# Patient Record
Sex: MALE | Race: WHITE | NOT HISPANIC OR LATINO | Employment: UNEMPLOYED | URBAN - METROPOLITAN AREA
[De-identification: names, ages, dates, MRNs, and addresses within clinical notes are randomized per-mention and may not be internally consistent; named-entity substitution may affect disease eponyms.]

---

## 2018-10-31 ENCOUNTER — OFFICE VISIT CONVERTED (OUTPATIENT)
Dept: INTERNAL MEDICINE | Facility: CLINIC | Age: 48
End: 2018-10-31
Attending: INTERNAL MEDICINE

## 2018-12-05 ENCOUNTER — OFFICE VISIT CONVERTED (OUTPATIENT)
Dept: INTERNAL MEDICINE | Facility: CLINIC | Age: 48
End: 2018-12-05
Attending: INTERNAL MEDICINE

## 2018-12-19 ENCOUNTER — OFFICE VISIT CONVERTED (OUTPATIENT)
Dept: INTERNAL MEDICINE | Facility: CLINIC | Age: 48
End: 2018-12-19
Attending: NURSE PRACTITIONER

## 2019-01-04 ENCOUNTER — HOSPITAL ENCOUNTER (OUTPATIENT)
Dept: OTHER | Facility: HOSPITAL | Age: 49
Discharge: HOME | End: 2019-01-04

## 2019-01-04 LAB
25(OH)D3 SERPL-MCNC: 34.8 NG/ML (ref 30–100)
ALBUMIN SERPL-MCNC: 4.3 G/DL (ref 3.5–5)
ALBUMIN/GLOB SERPL: 1.2 {RATIO} (ref 1.4–2.6)
ALP SERPL-CCNC: 63 U/L (ref 53–128)
ALT SERPL-CCNC: 19 U/L (ref 10–40)
ANION GAP SERPL CALC-SCNC: 16 MMOL/L (ref 8–19)
AST SERPL-CCNC: 25 U/L (ref 15–50)
BASOPHILS # BLD AUTO: 0.06 10*3/UL (ref 0–0.2)
BASOPHILS NFR BLD AUTO: 0.7 % (ref 0–3)
BILIRUB SERPL-MCNC: 0.69 MG/DL (ref 0.2–1.3)
BUN SERPL-MCNC: 14 MG/DL (ref 5–25)
BUN/CREAT SERPL: 11 {RATIO} (ref 6–20)
CALCIUM SERPL-MCNC: 9.6 MG/DL (ref 8.7–10.4)
CHLORIDE SERPL-SCNC: 103 MMOL/L (ref 99–111)
CONV CO2: 24 MMOL/L (ref 22–32)
CONV TOTAL PROTEIN: 8 G/DL (ref 6.3–8.2)
CREAT UR-MCNC: 1.23 MG/DL (ref 0.7–1.2)
EOSINOPHIL # BLD AUTO: 0.37 10*3/UL (ref 0–0.7)
EOSINOPHIL # BLD AUTO: 4.18 % (ref 0–7)
ERYTHROCYTE [DISTWIDTH] IN BLOOD BY AUTOMATED COUNT: 12.9 % (ref 11.5–14.5)
GFR SERPLBLD BASED ON 1.73 SQ M-ARVRAT: >60 ML/MIN/{1.73_M2}
GLOBULIN UR ELPH-MCNC: 3.7 G/DL (ref 2–3.5)
GLUCOSE SERPL-MCNC: 86 MG/DL (ref 70–99)
HBA1C MFR BLD: 15.6 G/DL (ref 14–18)
HCT VFR BLD AUTO: 44.9 % (ref 42–52)
LYMPHOCYTES # BLD AUTO: 2.41 10*3/UL (ref 1–5)
MCH RBC QN AUTO: 29.1 PG (ref 27–31)
MCHC RBC AUTO-ENTMCNC: 34.7 G/DL (ref 33–37)
MCV RBC AUTO: 83.9 FL (ref 80–96)
MONOCYTES # BLD AUTO: 0.84 10*3/UL (ref 0.2–1.2)
MONOCYTES NFR BLD AUTO: 9.5 % (ref 3–10)
NEUTROPHILS # BLD AUTO: 5.12 10*3/UL (ref 2–8)
NEUTROPHILS NFR BLD AUTO: 58.2 % (ref 30–85)
NRBC BLD AUTO-RTO: 0 % (ref 0–0.01)
OSMOLALITY SERPL CALC.SUM OF ELEC: 288 MOSM/KG (ref 273–304)
PLATELET # BLD AUTO: 326 10*3/UL (ref 130–400)
PMV BLD AUTO: 7.9 FL (ref 7.4–10.4)
POTASSIUM SERPL-SCNC: 4.4 MMOL/L (ref 3.5–5.3)
RBC # BLD AUTO: 5.36 10*6/UL (ref 4.7–6.1)
SODIUM SERPL-SCNC: 139 MMOL/L (ref 135–147)
VARIANT LYMPHS NFR BLD MANUAL: 27.4 % (ref 20–45)
WBC # BLD AUTO: 8.8 10*3/UL (ref 4.8–10.8)

## 2019-01-14 ENCOUNTER — OFFICE VISIT CONVERTED (OUTPATIENT)
Dept: INTERNAL MEDICINE | Facility: CLINIC | Age: 49
End: 2019-01-14
Attending: INTERNAL MEDICINE

## 2019-02-19 ENCOUNTER — CONVERSION ENCOUNTER (OUTPATIENT)
Dept: INTERNAL MEDICINE | Facility: CLINIC | Age: 49
End: 2019-02-19

## 2019-02-19 ENCOUNTER — OFFICE VISIT CONVERTED (OUTPATIENT)
Dept: INTERNAL MEDICINE | Facility: CLINIC | Age: 49
End: 2019-02-19
Attending: INTERNAL MEDICINE

## 2019-04-12 ENCOUNTER — OFFICE VISIT CONVERTED (OUTPATIENT)
Dept: CARDIOLOGY | Facility: CLINIC | Age: 49
End: 2019-04-12
Attending: INTERNAL MEDICINE

## 2019-04-12 ENCOUNTER — CONVERSION ENCOUNTER (OUTPATIENT)
Dept: CARDIOLOGY | Facility: CLINIC | Age: 49
End: 2019-04-12

## 2019-04-15 ENCOUNTER — OFFICE VISIT CONVERTED (OUTPATIENT)
Dept: NEUROLOGY | Facility: CLINIC | Age: 49
End: 2019-04-15
Attending: PSYCHIATRY & NEUROLOGY

## 2019-08-20 ENCOUNTER — OFFICE VISIT CONVERTED (OUTPATIENT)
Dept: INTERNAL MEDICINE | Facility: CLINIC | Age: 49
End: 2019-08-20
Attending: INTERNAL MEDICINE

## 2019-08-20 ENCOUNTER — HOSPITAL ENCOUNTER (OUTPATIENT)
Dept: OTHER | Facility: HOSPITAL | Age: 49
Discharge: HOME | End: 2019-08-20
Attending: INTERNAL MEDICINE

## 2019-08-20 LAB
BASOPHILS # BLD AUTO: 0.09 10*3/UL (ref 0–0.2)
BASOPHILS NFR BLD AUTO: 0.9 % (ref 0–3)
CHOLEST SERPL-MCNC: 119 MG/DL (ref 107–200)
CHOLEST/HDLC SERPL: 4 {RATIO} (ref 3–6)
CONV ABS IMM GRAN: 0.04 10*3/UL (ref 0–0.2)
CONV IMMATURE GRAN: 0.4 % (ref 0–1.8)
DEPRECATED RDW RBC AUTO: 43.7 FL (ref 35.1–43.9)
EOSINOPHIL # BLD AUTO: 0.32 10*3/UL (ref 0–0.7)
EOSINOPHIL # BLD AUTO: 3.1 % (ref 0–7)
ERYTHROCYTE [DISTWIDTH] IN BLOOD BY AUTOMATED COUNT: 13.4 % (ref 11.6–14.4)
FOLATE SERPL-MCNC: 11.3 NG/ML (ref 4.8–20)
HCT VFR BLD AUTO: 41.4 % (ref 42–52)
HDLC SERPL-MCNC: 30 MG/DL (ref 40–60)
HGB BLD-MCNC: 13.4 G/DL (ref 14–18)
LDLC SERPL CALC-MCNC: 51 MG/DL (ref 70–100)
LYMPHOCYTES # BLD AUTO: 2.17 10*3/UL (ref 1–5)
LYMPHOCYTES NFR BLD AUTO: 21.2 % (ref 20–45)
MAGNESIUM SERPL-MCNC: 2.26 MG/DL (ref 1.6–2.3)
MCH RBC QN AUTO: 29.1 PG (ref 27–31)
MCHC RBC AUTO-ENTMCNC: 32.4 G/DL (ref 33–37)
MCV RBC AUTO: 89.8 FL (ref 80–96)
MONOCYTES # BLD AUTO: 1.03 10*3/UL (ref 0.2–1.2)
MONOCYTES NFR BLD AUTO: 10.1 % (ref 3–10)
NEUTROPHILS # BLD AUTO: 6.59 10*3/UL (ref 2–8)
NEUTROPHILS NFR BLD AUTO: 64.3 % (ref 30–85)
NRBC CBCN: 0 % (ref 0–0.7)
PLATELET # BLD AUTO: 290 10*3/UL (ref 130–400)
PMV BLD AUTO: 11 FL (ref 9.4–12.4)
RBC # BLD AUTO: 4.61 10*6/UL (ref 4.7–6.1)
TRIGL SERPL-MCNC: 189 MG/DL (ref 40–150)
VIT B12 SERPL-MCNC: 461 PG/ML (ref 211–911)
VLDLC SERPL-MCNC: 38 MG/DL (ref 5–37)
WBC # BLD AUTO: 10.24 10*3/UL (ref 4.8–10.8)

## 2019-08-21 LAB
RETICS # AUTO: 0.06 10*6/UL (ref 0.03–0.1)
RETICS/RBC NFR AUTO: 1.28 % (ref 0.51–1.81)

## 2019-08-22 LAB
FERRITIN SERPL-MCNC: 172 NG/ML (ref 30–300)
IRON SATN MFR SERPL: 29 % (ref 20–55)
IRON SERPL-MCNC: 95 UG/DL (ref 70–180)
TIBC SERPL-MCNC: 329 UG/DL (ref 245–450)
TRANSFERRIN SERPL-MCNC: 230 MG/DL (ref 215–365)

## 2019-09-03 ENCOUNTER — OFFICE VISIT CONVERTED (OUTPATIENT)
Dept: INTERNAL MEDICINE | Facility: CLINIC | Age: 49
End: 2019-09-03
Attending: INTERNAL MEDICINE

## 2019-10-17 ENCOUNTER — OFFICE VISIT CONVERTED (OUTPATIENT)
Dept: CARDIOLOGY | Facility: CLINIC | Age: 49
End: 2019-10-17
Attending: INTERNAL MEDICINE

## 2019-12-18 ENCOUNTER — OFFICE VISIT CONVERTED (OUTPATIENT)
Dept: NEUROLOGY | Facility: CLINIC | Age: 49
End: 2019-12-18
Attending: PSYCHIATRY & NEUROLOGY

## 2020-03-02 ENCOUNTER — HOSPITAL ENCOUNTER (OUTPATIENT)
Dept: OTHER | Facility: HOSPITAL | Age: 50
Discharge: HOME | End: 2020-03-02
Attending: INTERNAL MEDICINE

## 2020-03-02 ENCOUNTER — OFFICE VISIT CONVERTED (OUTPATIENT)
Dept: INTERNAL MEDICINE | Facility: CLINIC | Age: 50
End: 2020-03-02
Attending: INTERNAL MEDICINE

## 2020-03-02 LAB
ALBUMIN SERPL-MCNC: 4.3 G/DL (ref 3.5–5)
ALBUMIN/GLOB SERPL: 1.3 {RATIO} (ref 1.4–2.6)
ALP SERPL-CCNC: 68 U/L (ref 53–128)
ALT SERPL-CCNC: 44 U/L (ref 10–40)
ANION GAP SERPL CALC-SCNC: 18 MMOL/L (ref 8–19)
AST SERPL-CCNC: 31 U/L (ref 15–50)
BASOPHILS # BLD AUTO: 0.09 10*3/UL (ref 0–0.2)
BASOPHILS NFR BLD AUTO: 1 % (ref 0–3)
BILIRUB SERPL-MCNC: 0.4 MG/DL (ref 0.2–1.3)
BUN SERPL-MCNC: 13 MG/DL (ref 5–25)
BUN/CREAT SERPL: 9 {RATIO} (ref 6–20)
CALCIUM SERPL-MCNC: 9.2 MG/DL (ref 8.7–10.4)
CHLORIDE SERPL-SCNC: 103 MMOL/L (ref 99–111)
CHOLEST SERPL-MCNC: 98 MG/DL (ref 107–200)
CHOLEST/HDLC SERPL: 3.6 {RATIO} (ref 3–6)
CONV ABS IMM GRAN: 0.04 10*3/UL (ref 0–0.2)
CONV CO2: 24 MMOL/L (ref 22–32)
CONV IMMATURE GRAN: 0.5 % (ref 0–1.8)
CONV TOTAL PROTEIN: 7.6 G/DL (ref 6.3–8.2)
CREAT UR-MCNC: 1.4 MG/DL (ref 0.7–1.2)
DEPRECATED RDW RBC AUTO: 43 FL (ref 35.1–43.9)
EOSINOPHIL # BLD AUTO: 0.3 10*3/UL (ref 0–0.7)
EOSINOPHIL # BLD AUTO: 3.4 % (ref 0–7)
ERYTHROCYTE [DISTWIDTH] IN BLOOD BY AUTOMATED COUNT: 13.2 % (ref 11.6–14.4)
GFR SERPLBLD BASED ON 1.73 SQ M-ARVRAT: 59 ML/MIN/{1.73_M2}
GLOBULIN UR ELPH-MCNC: 3.3 G/DL (ref 2–3.5)
GLUCOSE SERPL-MCNC: 159 MG/DL (ref 70–99)
HCT VFR BLD AUTO: 44.9 % (ref 42–52)
HDLC SERPL-MCNC: 27 MG/DL (ref 40–60)
HGB BLD-MCNC: 14.7 G/DL (ref 14–18)
LDLC SERPL CALC-MCNC: 34 MG/DL (ref 70–100)
LYMPHOCYTES # BLD AUTO: 2.43 10*3/UL (ref 1–5)
LYMPHOCYTES NFR BLD AUTO: 27.9 % (ref 20–45)
MCH RBC QN AUTO: 28.8 PG (ref 27–31)
MCHC RBC AUTO-ENTMCNC: 32.7 G/DL (ref 33–37)
MCV RBC AUTO: 87.9 FL (ref 80–96)
MONOCYTES # BLD AUTO: 0.76 10*3/UL (ref 0.2–1.2)
MONOCYTES NFR BLD AUTO: 8.7 % (ref 3–10)
NEUTROPHILS # BLD AUTO: 5.1 10*3/UL (ref 2–8)
NEUTROPHILS NFR BLD AUTO: 58.5 % (ref 30–85)
NRBC CBCN: 0 % (ref 0–0.7)
OSMOLALITY SERPL CALC.SUM OF ELEC: 295 MOSM/KG (ref 273–304)
PLATELET # BLD AUTO: 286 10*3/UL (ref 130–400)
PMV BLD AUTO: 10.8 FL (ref 9.4–12.4)
POTASSIUM SERPL-SCNC: 3.9 MMOL/L (ref 3.5–5.3)
RBC # BLD AUTO: 5.11 10*6/UL (ref 4.7–6.1)
SODIUM SERPL-SCNC: 141 MMOL/L (ref 135–147)
TRIGL SERPL-MCNC: 184 MG/DL (ref 40–150)
TSH SERPL-ACNC: 1.06 M[IU]/L (ref 0.27–4.2)
VLDLC SERPL-MCNC: 37 MG/DL (ref 5–37)
WBC # BLD AUTO: 8.72 10*3/UL (ref 4.8–10.8)

## 2020-05-27 ENCOUNTER — TELEMEDICINE CONVERTED (OUTPATIENT)
Dept: CARDIOLOGY | Facility: CLINIC | Age: 50
End: 2020-05-27
Attending: INTERNAL MEDICINE

## 2020-07-09 ENCOUNTER — HOSPITAL ENCOUNTER (OUTPATIENT)
Dept: OTHER | Facility: HOSPITAL | Age: 50
Discharge: HOME | End: 2020-07-09
Attending: INTERNAL MEDICINE

## 2020-07-09 LAB
EST. AVERAGE GLUCOSE BLD GHB EST-MCNC: 128 MG/DL
HBA1C MFR BLD: 6.1 % (ref 3.5–5.7)

## 2020-11-12 ENCOUNTER — CONVERSION ENCOUNTER (OUTPATIENT)
Dept: CARDIOLOGY | Facility: CLINIC | Age: 50
End: 2020-11-12

## 2020-11-12 ENCOUNTER — OFFICE VISIT CONVERTED (OUTPATIENT)
Dept: CARDIOLOGY | Facility: CLINIC | Age: 50
End: 2020-11-12
Attending: INTERNAL MEDICINE

## 2021-05-10 ENCOUNTER — OFFICE VISIT CONVERTED (OUTPATIENT)
Dept: CARDIOLOGY | Facility: CLINIC | Age: 51
End: 2021-05-10
Attending: INTERNAL MEDICINE

## 2021-05-10 ENCOUNTER — CONVERSION ENCOUNTER (OUTPATIENT)
Dept: CARDIOLOGY | Facility: CLINIC | Age: 51
End: 2021-05-10

## 2021-05-13 NOTE — PROGRESS NOTES
"   Progress Note      Patient Name: Yefri Mcgee   Patient ID: [id number]   Sex: Male   YOB: 1970    Primary Care Provider: Len Nichols MD   Referring Provider: Len Nichols MD    Visit Date: November 12, 2020    Provider: Leo Alonso MD   Location: [facility]   Location Address: [address] [place] [address], [facility]   [place], [place]  [id number]   Location Phone: [phone]          Chief Complaint     Atrial fibrillation.  Coronary artery disease.       History Of Present Illness  REFERRING CARE PROVIDER: Len Nichols MD   Yefri Mcgee is a 49 year old /White male with known coronary artery disease with prior stent, paroxysmal atrial fibrillation, mild systolic heart failure, EF 45%, and hypertension who has been doing well. He did say he had not been completely compliant with his medications due to being out of town and on the go recently.   PAST MEDICAL HISTORY: Atrial fibrillation, paroxysmal; CVA; Congestive heart failure, systolic; Coronary artery disease with stent; Hyperlipidemia; Hypertension.   PSYCHOSOCIAL HISTORY: Current smoker of one pack per day. Rarely consumes alcohol.   CURRENT MEDICATIONS: Amlodipine 10 mg daily; ezetimibe 10 mg daily; losartan 100 mg daily; carvedilol 25 mg daily; Xarelto 20 mg daily; aspirin 81 mg daily; furosemide 40 mg daily; atorvastatin 80 mg daily.       Review of Systems  · Cardiovascular  o Denies  o : palpitations (fast, fluttering, or skipping beats), swelling (feet, ankles, hands), shortness of breath while walking or lying flat, chest pain or angina pectoris   · Respiratory  o Denies  o : chronic or frequent cough      Vitals  Date Time BP Position Site L\R Cuff Size HR RR TEMP (F) WT  HT  BMI kg/m2 BSA m2 O2 Sat FR L/min FiO2 HC       11/12/2020 09:15 /94 Sitting    69 - R   295lbs 16oz 5'  9\" 43.71 2.56       11/12/2020 09:15 /97 Sitting                       Physical " Examination  · Constitutional  o Appearance  o : Awake, alert, in no acute distress.   · Eyes  o Conjunctivae  o : Normal.  · Ears, Nose, Mouth and Throat  o Oral Cavity  o :   § Oral Mucosa  § : Normal.  · Neck  o Inspection/Palpation  o : No JVD. Good carotid upstroke. No thyromegaly.  · Respiratory  o Respiratory  o : Good respiratory effort. Clear to percussion and auscultation.  · Cardiovascular  o Heart  o :   § Auscultation of Heart  § : S1, S2 normal. Regular rate and rhythm without murmurs, gallops, or rubs.  o Peripheral Vascular System  o :   § Extremities  § : Good femoral and pedal pulses. No pedal edema.  · Gastrointestinal  o Abdominal Examination  o : Soft. No tenderness or masses felt. No hepatosplenomegaly. Abdominal aorta is not palpable.  · Labs  o Labs  o : Creatinine 1.4, potassium 3.7, triglycerides 218.           Assessment     ASSESSMENT & PLAN:    1.  Coronary artery disease with previous stenting, no anginal discomfort.  Continue with chronic       aspirin 81 mg once a day.  2.  Hypertension, mild elevation, but patient had not been compliant with his meds.  He states he is going to be        doing a better job in the future.  Recommended keeping a home blood pressure log.  Counseled on weight        loss and exercise.  3.  Hyperlipidemia.  Still attempting to obtain his LDL level.  Continue with his current statin dose.  4.  Smoking.  Counseled on importance of cessation.  Offered both Chantix and Wellbutrin. Did spend over 3        minutes counseling.             Electronically Signed by: Latonia Phillips-, Other -Author on November 17, 2020 12:50:02 PM  Electronically Co-signed by: Leo Alonso MD -Reviewer on November 18, 2020 09:38:59 AM

## 2021-05-13 NOTE — PROGRESS NOTES
Progress Note      Patient Name: Yefri Mcgee   Patient ID: [id number]   Sex: Male   YOB: 1970    Primary Care Provider: Len Nichols MD   Referring Provider: Len Nichols MD    Visit Date: May 27, 2020    Provider: Leo Alonso MD   Location: [facility]   Location Address: [address] [place] [address]   [place]  [id number]   Location Phone: [phone]          Chief Complaint  · Atrial fibrillation   · Congestive heart failure       History Of Present Illness  TELEHEALTH TELEPHONE VISIT  Yefri Mcgee is a 49 year old /White male who is presenting for evaluation via telehealth telephone visit. Verbal consent obtained before beginning visit. Telehealth due to COVID-19. He has known coronary artery disease with prior stenting, mild systolic congestive heart failure, ejection fraction of 45%, paroxysmal atrial fibrillation and hypertension. No new problems or complaints. He denies chest pain or shortness of breath.   Provider spent 5 minutes with the patient during the telehealth visit.   The following staff were present during this visit: Provider only.   PAST MEDICAL HISTORY: Atrial fibrillation, paroxysmal; CVA; Congestive heart failure, systolic; Coronary artery disease with stent; Hyperlipidemia; Hypertension.   FAMILY HISTORY: Positive for diabetes.   PSYCHOSOCIAL HISTORY: No history of mood changes or depression. He rarely drinks alcohol. He smokes 1 pack of cigarettes per day.   CURRENT MEDICATIONS: include ASA 81 mg daily; Xarelto 20 mg daily; Lasix 40 mg daily; Amlodipine 10 mg daily; Atorvastatin 80 mg daily; Carvedilol 25 mg b.i.d.; Losartan 100 mg daily; Zetia 10 mg daily. The dosage and frequency of the medications were reviewed with the patient.       Review of Systems  · Cardiovascular  o Denies  o : palpitations (fast, fluttering, or skipping beats), swelling (feet, ankles, hands), shortness of breath while walking or lying  "flat, chest pain or angina pectoris   · Respiratory  o Denies  o : chronic or frequent cough, asthma or wheezing      Vitals     Per patient, home blood pressure is 132/90.  Height: 5'9\".  Weight: 295.           Assessment     ASSESSMENT AND PLAN:    1.  Coronary artery disease with prior stenting:  On chronic ASA with no anginal symptoms.  2.  Paroxysmal atrial fibrillation:  Maintaining normal sinus rhythm and on Xarelto for CVA prevention.  3.  Hypertension controlled.  4.  Dyslipidemia:  Plan on repeating lipids on next visit, goal LDL of less than 70.  Tolerating well.    MD Paresh Luther/mckenna         This note was transcribed by Joyce Saavedra.  mckenna/paresh   The above service was transcribed by Joyce Saavedra, and I attest to the accuracy of the note.  PARESH.               Electronically Signed by: Joyce Saavedra-, -Author on June 4, 2020 09:08:27 AM  Electronically Co-signed by: Leo Alonso MD -Reviewer on June 4, 2020 09:10:15 AM  "

## 2021-05-14 VITALS
HEIGHT: 69 IN | DIASTOLIC BLOOD PRESSURE: 94 MMHG | WEIGHT: 296 LBS | HEART RATE: 69 BPM | SYSTOLIC BLOOD PRESSURE: 148 MMHG | BODY MASS INDEX: 43.84 KG/M2

## 2021-05-15 VITALS
DIASTOLIC BLOOD PRESSURE: 86 MMHG | WEIGHT: 295.31 LBS | HEIGHT: 69 IN | BODY MASS INDEX: 43.74 KG/M2 | SYSTOLIC BLOOD PRESSURE: 145 MMHG | HEART RATE: 65 BPM

## 2021-05-15 VITALS
OXYGEN SATURATION: 97 % | DIASTOLIC BLOOD PRESSURE: 82 MMHG | WEIGHT: 290 LBS | HEART RATE: 68 BPM | TEMPERATURE: 97.6 F | SYSTOLIC BLOOD PRESSURE: 126 MMHG | HEIGHT: 69 IN | BODY MASS INDEX: 42.95 KG/M2

## 2021-05-15 VITALS
TEMPERATURE: 97 F | HEIGHT: 69 IN | BODY MASS INDEX: 42.36 KG/M2 | OXYGEN SATURATION: 98 % | DIASTOLIC BLOOD PRESSURE: 82 MMHG | WEIGHT: 286 LBS | HEART RATE: 77 BPM | SYSTOLIC BLOOD PRESSURE: 134 MMHG

## 2021-05-15 VITALS
WEIGHT: 285 LBS | SYSTOLIC BLOOD PRESSURE: 190 MMHG | DIASTOLIC BLOOD PRESSURE: 128 MMHG | TEMPERATURE: 98.4 F | OXYGEN SATURATION: 97 % | BODY MASS INDEX: 42.21 KG/M2 | HEART RATE: 92 BPM | HEIGHT: 69 IN

## 2021-05-15 VITALS
TEMPERATURE: 99 F | OXYGEN SATURATION: 97 % | SYSTOLIC BLOOD PRESSURE: 172 MMHG | DIASTOLIC BLOOD PRESSURE: 102 MMHG | HEART RATE: 74 BPM | WEIGHT: 282 LBS | HEIGHT: 69 IN | BODY MASS INDEX: 41.77 KG/M2 | RESPIRATION RATE: 16 BRPM

## 2021-05-15 VITALS
SYSTOLIC BLOOD PRESSURE: 136 MMHG | HEIGHT: 69 IN | BODY MASS INDEX: 43.69 KG/M2 | DIASTOLIC BLOOD PRESSURE: 90 MMHG | HEART RATE: 68 BPM | WEIGHT: 295 LBS

## 2021-05-15 VITALS
HEART RATE: 90 BPM | OXYGEN SATURATION: 97 % | HEIGHT: 69 IN | DIASTOLIC BLOOD PRESSURE: 72 MMHG | WEIGHT: 280.25 LBS | TEMPERATURE: 97.8 F | SYSTOLIC BLOOD PRESSURE: 146 MMHG | BODY MASS INDEX: 41.51 KG/M2

## 2021-05-15 VITALS
WEIGHT: 286 LBS | SYSTOLIC BLOOD PRESSURE: 112 MMHG | BODY MASS INDEX: 42.36 KG/M2 | HEIGHT: 69 IN | HEART RATE: 64 BPM | DIASTOLIC BLOOD PRESSURE: 66 MMHG

## 2021-05-15 VITALS
HEART RATE: 80 BPM | TEMPERATURE: 97.2 F | HEIGHT: 69 IN | OXYGEN SATURATION: 95 % | DIASTOLIC BLOOD PRESSURE: 66 MMHG | BODY MASS INDEX: 43.69 KG/M2 | SYSTOLIC BLOOD PRESSURE: 122 MMHG | WEIGHT: 295 LBS

## 2021-05-15 VITALS
OXYGEN SATURATION: 96 % | HEART RATE: 88 BPM | WEIGHT: 302.5 LBS | DIASTOLIC BLOOD PRESSURE: 92 MMHG | BODY MASS INDEX: 44.8 KG/M2 | SYSTOLIC BLOOD PRESSURE: 122 MMHG | HEIGHT: 69 IN | TEMPERATURE: 97.2 F

## 2021-05-15 VITALS
SYSTOLIC BLOOD PRESSURE: 121 MMHG | HEART RATE: 66 BPM | BODY MASS INDEX: 42.21 KG/M2 | WEIGHT: 285 LBS | DIASTOLIC BLOOD PRESSURE: 71 MMHG | HEIGHT: 69 IN

## 2021-05-16 VITALS
DIASTOLIC BLOOD PRESSURE: 86 MMHG | HEIGHT: 69 IN | SYSTOLIC BLOOD PRESSURE: 152 MMHG | TEMPERATURE: 97 F | HEART RATE: 105 BPM | OXYGEN SATURATION: 96 % | BODY MASS INDEX: 42.51 KG/M2 | WEIGHT: 287 LBS

## 2021-06-05 NOTE — PROGRESS NOTES
"   Progress Note      Patient Name: Yefri Mcgee   Patient ID: [id number]   Sex: Male   YOB: 1970    Primary Care Provider: Len Nichols MD   Referring Provider: Len Nichols MD    Visit Date: May 10, 2021    Provider: Leo Alonso MD   Location: [facility]   Location Address: [address] [place] [address], [facility]   [place], [place]  [id number]   Location Phone: [phone]          Chief Complaint     Coronary artery disease.       History Of Present Illness  REFERRING CARE PROVIDER: Len Nichols MD   Yefri Mcgee is a 50 year old /White male with coronary artery disease with prior stenting, paroxysmal atrial fibrillation, mild systolic heart failure, EF 45%, and hypertension who has been doing well. The patient denies any ongoing chest pain issues. No shortness of breath.   PAST MEDICAL HISTORY: Atrial fibrillation, paroxysmal; CVA; Congestive heart failure, systolic; Coronary artery disease with stent; Hyperlipidemia; Hypertension.   PSYCHOSOCIAL HISTORY: Current smoker of 1.5 packs per year. Rarely consumes alcohol.   CURRENT MEDICATIONS: Medications reviewed and are as documented.      ALLERGIES:  No known drug allergies.       Review of Systems  · Cardiovascular  o Denies  o : palpitations (fast, fluttering, or skipping beats), swelling (feet, ankles, hands), shortness of breath while walking or lying flat, chest pain or angina pectoris   · Respiratory  o Denies  o : chronic or frequent cough      Vitals  Date Time BP Position Site L\R Cuff Size HR RR TEMP (F) WT  HT  BMI kg/m2 BSA m2 O2 Sat FR L/min FiO2 HC       05/10/2021 10:31 /80 Sitting    70 - R   302lbs 0oz 5'  9\" 44.6 2.58             Physical Examination  · Constitutional  o Appearance  o : Awake, alert, in no acute distress.   · Eyes  o Conjunctivae  o : Normal.  · Ears, Nose, Mouth and Throat  o Oral Cavity  o :   § Oral Mucosa  § : Normal.  · Neck  o Inspection/Palpation  o : No JVD. Good carotid " upstroke. No thyromegaly.  · Respiratory  o Respiratory  o : Good respiratory effort. Clear to percussion and auscultation.  · Cardiovascular  o Heart  o :   § Auscultation of Heart  § : S1, S2 normal. Regular rate and rhythm without murmurs, gallops, or rubs.  o Peripheral Vascular System  o :   § Extremities  § : Trace lower extremity edema bilaterally. Good femoral and pedal pulses.   · Gastrointestinal  o Abdominal Examination  o : Soft. No tenderness or masses felt. No hepatosplenomegaly. Abdominal aorta is not palpable.  · EKG  o EKG  o : Performed in the office today.  o Indications  o : Coronary artery disease.  o Results  o : Normal sinus rhythm. Incomplete left bundle branch block. Probable LVH. ST elevations secondary to early repolarization.  o Comparison  o : No significant acute changes.           Assessment     ASSESSMENT & PLAN:    1.  Coronary artery disease with prior stenting.  No anginal discomfort.  On chronic aspirin 81 mg once a day.  2.  Hyperlipidemia, on high-intensity statin with Lipitor 80 mg daily.  Previous LDL had been 80.  Will repeat        today.  Continue with his current dose.  Patient is also on Zetia 10 mg once a day.    3.  Hypertension, controlled on losartan 100 mg daily.    4.  Smoking.  Counseled on importance of cessation.  Patient does still smoke 1.5 packs per year.  He states he        was entertaining the possibility of quitting, but was trying to convince his wife to, as well, and is going to        discuss with his PCP.           Plan  · Referrals  o ID: [id number] Date: 10/27/2020 Type: Inbound  Specialty: Cardiology            Electronically Signed by: Latonia Phillips-, Other -Author on May 11, 2021 05:30:25 AM  Electronically Co-signed by: Leo Alonso MD -Reviewer on May 12, 2021 03:39:05 PM

## 2021-06-07 ENCOUNTER — OFFICE VISIT (OUTPATIENT)
Dept: INTERNAL MEDICINE | Facility: CLINIC | Age: 51
End: 2021-06-07

## 2021-06-07 VITALS
TEMPERATURE: 97.2 F | DIASTOLIC BLOOD PRESSURE: 87 MMHG | HEIGHT: 69 IN | SYSTOLIC BLOOD PRESSURE: 130 MMHG | OXYGEN SATURATION: 97 % | HEART RATE: 70 BPM | BODY MASS INDEX: 44.14 KG/M2 | WEIGHT: 298 LBS

## 2021-06-07 DIAGNOSIS — I63.9 CEREBROVASCULAR ACCIDENT (CVA), UNSPECIFIED MECHANISM (HCC): ICD-10-CM

## 2021-06-07 DIAGNOSIS — E78.5 HYPERLIPIDEMIA, UNSPECIFIED HYPERLIPIDEMIA TYPE: ICD-10-CM

## 2021-06-07 DIAGNOSIS — I48.20 CHRONIC ATRIAL FIBRILLATION (HCC): Primary | ICD-10-CM

## 2021-06-07 DIAGNOSIS — N18.2 CHRONIC RENAL IMPAIRMENT, STAGE 2 (MILD): ICD-10-CM

## 2021-06-07 DIAGNOSIS — I10 ESSENTIAL HYPERTENSION: ICD-10-CM

## 2021-06-07 DIAGNOSIS — R73.01 IMPAIRED FASTING GLUCOSE: Chronic | ICD-10-CM

## 2021-06-07 PROBLEM — N18.9 CHRONIC RENAL IMPAIRMENT: Status: ACTIVE | Noted: 2021-06-07

## 2021-06-07 PROBLEM — I48.91 ATRIAL FIBRILLATION: Status: ACTIVE | Noted: 2019-01-15

## 2021-06-07 LAB
ALBUMIN SERPL-MCNC: 4.2 G/DL (ref 3.5–5.2)
ALBUMIN/GLOB SERPL: 1.3 G/DL
ALP SERPL-CCNC: 70 U/L (ref 39–117)
ALT SERPL W P-5'-P-CCNC: 22 U/L (ref 1–41)
ANION GAP SERPL CALCULATED.3IONS-SCNC: 7.4 MMOL/L (ref 5–15)
AST SERPL-CCNC: 17 U/L (ref 1–40)
BASOPHILS # BLD AUTO: 0.09 10*3/MM3 (ref 0–0.2)
BASOPHILS NFR BLD AUTO: 1 % (ref 0–1.5)
BILIRUB SERPL-MCNC: 0.7 MG/DL (ref 0–1.2)
BUN SERPL-MCNC: 15 MG/DL (ref 6–20)
BUN/CREAT SERPL: 10.9 (ref 7–25)
CALCIUM SPEC-SCNC: 9.2 MG/DL (ref 8.6–10.5)
CHLORIDE SERPL-SCNC: 105 MMOL/L (ref 98–107)
CHOLEST SERPL-MCNC: 134 MG/DL (ref 0–200)
CO2 SERPL-SCNC: 25.6 MMOL/L (ref 22–29)
CREAT SERPL-MCNC: 1.37 MG/DL (ref 0.76–1.27)
DEPRECATED RDW RBC AUTO: 43.5 FL (ref 37–54)
EOSINOPHIL # BLD AUTO: 0.26 10*3/MM3 (ref 0–0.4)
EOSINOPHIL NFR BLD AUTO: 2.8 % (ref 0.3–6.2)
ERYTHROCYTE [DISTWIDTH] IN BLOOD BY AUTOMATED COUNT: 13.7 % (ref 12.3–15.4)
GFR SERPL CREATININE-BSD FRML MDRD: 55 ML/MIN/1.73
GLOBULIN UR ELPH-MCNC: 3.2 GM/DL
GLUCOSE SERPL-MCNC: 82 MG/DL (ref 65–99)
HBA1C MFR BLD: 6.2 % (ref 4.8–5.6)
HCT VFR BLD AUTO: 49.1 % (ref 37.5–51)
HDLC SERPL-MCNC: 27 MG/DL (ref 40–60)
HGB BLD-MCNC: 17 G/DL (ref 13–17.7)
IMM GRANULOCYTES # BLD AUTO: 0.04 10*3/MM3 (ref 0–0.05)
IMM GRANULOCYTES NFR BLD AUTO: 0.4 % (ref 0–0.5)
LDLC SERPL CALC-MCNC: 69 MG/DL (ref 0–100)
LDLC/HDLC SERPL: 2.28 {RATIO}
LYMPHOCYTES # BLD AUTO: 2.51 10*3/MM3 (ref 0.7–3.1)
LYMPHOCYTES NFR BLD AUTO: 27.1 % (ref 19.6–45.3)
MCH RBC QN AUTO: 30.1 PG (ref 26.6–33)
MCHC RBC AUTO-ENTMCNC: 34.6 G/DL (ref 31.5–35.7)
MCV RBC AUTO: 86.9 FL (ref 79–97)
MONOCYTES # BLD AUTO: 0.92 10*3/MM3 (ref 0.1–0.9)
MONOCYTES NFR BLD AUTO: 9.9 % (ref 5–12)
NEUTROPHILS NFR BLD AUTO: 5.45 10*3/MM3 (ref 1.7–7)
NEUTROPHILS NFR BLD AUTO: 58.8 % (ref 42.7–76)
NRBC BLD AUTO-RTO: 0 /100 WBC (ref 0–0.2)
PLATELET # BLD AUTO: 315 10*3/MM3 (ref 140–450)
PMV BLD AUTO: 10.9 FL (ref 6–12)
POTASSIUM SERPL-SCNC: 3.9 MMOL/L (ref 3.5–5.2)
PROT SERPL-MCNC: 7.4 G/DL (ref 6–8.5)
RBC # BLD AUTO: 5.65 10*6/MM3 (ref 4.14–5.8)
SODIUM SERPL-SCNC: 138 MMOL/L (ref 136–145)
TRIGL SERPL-MCNC: 227 MG/DL (ref 0–150)
TSH SERPL DL<=0.05 MIU/L-ACNC: 1.4 UIU/ML (ref 0.27–4.2)
VLDLC SERPL-MCNC: 38 MG/DL (ref 5–40)
WBC # BLD AUTO: 9.27 10*3/MM3 (ref 3.4–10.8)

## 2021-06-07 PROCEDURE — 83036 HEMOGLOBIN GLYCOSYLATED A1C: CPT | Performed by: INTERNAL MEDICINE

## 2021-06-07 PROCEDURE — 80053 COMPREHEN METABOLIC PANEL: CPT | Performed by: INTERNAL MEDICINE

## 2021-06-07 PROCEDURE — 85025 COMPLETE CBC W/AUTO DIFF WBC: CPT | Performed by: INTERNAL MEDICINE

## 2021-06-07 PROCEDURE — 99214 OFFICE O/P EST MOD 30 MIN: CPT | Performed by: INTERNAL MEDICINE

## 2021-06-07 PROCEDURE — 80061 LIPID PANEL: CPT | Performed by: INTERNAL MEDICINE

## 2021-06-07 PROCEDURE — 84443 ASSAY THYROID STIM HORMONE: CPT | Performed by: INTERNAL MEDICINE

## 2021-06-07 RX ORDER — CARVEDILOL 25 MG/1
25 TABLET ORAL 2 TIMES DAILY WITH MEALS
Qty: 90 TABLET | Refills: 3 | Status: SHIPPED | OUTPATIENT
Start: 2021-06-07 | End: 2021-08-09 | Stop reason: SDUPTHER

## 2021-06-07 RX ORDER — EZETIMIBE 10 MG/1
10 TABLET ORAL DAILY
Qty: 90 TABLET | Refills: 3 | Status: SHIPPED | OUTPATIENT
Start: 2021-06-07 | End: 2021-08-09 | Stop reason: SDUPTHER

## 2021-06-07 RX ORDER — ASPIRIN 81 MG/1
81 TABLET, CHEWABLE ORAL DAILY
Qty: 90 TABLET | Refills: 3 | Status: SHIPPED | OUTPATIENT
Start: 2021-06-07 | End: 2021-11-23 | Stop reason: SDUPTHER

## 2021-06-07 RX ORDER — EZETIMIBE 10 MG/1
TABLET ORAL
COMMUNITY
End: 2021-06-07 | Stop reason: SDUPTHER

## 2021-06-07 RX ORDER — CARVEDILOL 25 MG/1
25 TABLET ORAL 2 TIMES DAILY WITH MEALS
COMMUNITY
End: 2021-06-07 | Stop reason: SDUPTHER

## 2021-06-07 RX ORDER — ATORVASTATIN CALCIUM 80 MG/1
80 TABLET, FILM COATED ORAL DAILY
Qty: 90 TABLET | Refills: 3 | Status: SHIPPED | OUTPATIENT
Start: 2021-06-07 | End: 2021-08-09 | Stop reason: SDUPTHER

## 2021-06-07 RX ORDER — ATORVASTATIN CALCIUM 80 MG/1
80 TABLET, FILM COATED ORAL DAILY
COMMUNITY
End: 2021-06-07 | Stop reason: SDUPTHER

## 2021-06-07 RX ORDER — AMLODIPINE BESYLATE AND BENAZEPRIL HYDROCHLORIDE 10; 20 MG/1; MG/1
1 CAPSULE ORAL DAILY
COMMUNITY
End: 2021-06-07

## 2021-06-07 RX ORDER — ASPIRIN 81 MG/1
81 TABLET, CHEWABLE ORAL DAILY
COMMUNITY
End: 2021-06-07 | Stop reason: SDUPTHER

## 2021-06-07 RX ORDER — LOSARTAN POTASSIUM 100 MG/1
100 TABLET ORAL DAILY
Qty: 90 TABLET | Refills: 3 | Status: SHIPPED | OUTPATIENT
Start: 2021-06-07 | End: 2021-07-31 | Stop reason: HOSPADM

## 2021-06-07 RX ORDER — FUROSEMIDE 40 MG/1
40 TABLET ORAL 2 TIMES DAILY
COMMUNITY
End: 2021-06-07 | Stop reason: SDUPTHER

## 2021-06-07 RX ORDER — LOSARTAN POTASSIUM AND HYDROCHLOROTHIAZIDE 25; 100 MG/1; MG/1
1 TABLET ORAL DAILY
COMMUNITY
End: 2021-06-07

## 2021-06-07 RX ORDER — FUROSEMIDE 40 MG/1
40 TABLET ORAL 2 TIMES DAILY
Qty: 180 TABLET | Refills: 0 | Status: SHIPPED | OUTPATIENT
Start: 2021-06-07 | End: 2021-08-09

## 2021-06-07 RX ORDER — AMLODIPINE BESYLATE 10 MG/1
10 TABLET ORAL DAILY
Qty: 30 TABLET | Refills: 3 | Status: SHIPPED | OUTPATIENT
Start: 2021-06-07 | End: 2021-08-09 | Stop reason: SDUPTHER

## 2021-06-07 NOTE — PROGRESS NOTES
"Chief Complaint  Follow-up (routine follow up) follow up.     Subjective     {Problem List  Visit Diagnosis   Encounters  Notes  Medications  Labs  Result Review Imaging  Media :23}     Yefri Mcgee presents to [facility]  History of Present Illness  HTN- pt denies Has, dizziness, CP  CAD s/p PCI- no CP or JOSEPH. Pt cont to f/u with cardiology.   Atrial fibrillation- doing well.  HLD- reports compliance with statin and zetia.  CVA- at baseline. Doing well.     Objective   Vital Signs:   /87 (BP Location: Left arm, Patient Position: Sitting, Cuff Size: Large Adult)   Pulse 70   Temp 97.2 °F (36.2 °C)   Ht 175.3 cm (69\")   Wt 135 kg (298 lb)   SpO2 97%   BMI 44.01 kg/m²     Appearance: No acute distress, well-nourished  Head: normocephalic, atraumatic  Eyes: extraocular movements intact, no scleral icterus, no conjunctival injection  Ears, Nose, and Throat: external ears normal, nares patent, moist mucous membranes  Cardiovascular: regular rate and rhythm. no murmurs, rales, or rhonchi. no edema  Respiratory: breathing comfortably, symmetric chest rise, clear to auscultation bilaterally. No wheezes, rales, or rhonchi.  Neuro: alert and oriented to time, place, and person. Normal gait  Psych: normal mood and affect     Physical Exam   Result Review :   The following data was reviewed by: Len Nichols Jr, MD on 06/07/2021:  Common labs    Common Labsle 7/9/20   Hemoglobin A1C 6.1 (A)   (A) Abnormal value       Comments are available for some flowsheets but are not being displayed.                Assessment and Plan    Diagnoses and all orders for this visit:    1. Chronic atrial fibrillation (CMS/HCC) (Primary)  Comments:  rate controlled. cont xarelto.  Orders:  -     Comprehensive Metabolic Panel  -     CBC & Differential  -     TSH    2. Hyperlipidemia, unspecified hyperlipidemia type  Comments:  cont statin and zetia. check " labs  Orders:  -     Lipid Panel  -     Comprehensive Metabolic Panel  -     CBC & Differential    3. Essential hypertension  Comments:  well controlled. difficulty with remembering coreg BID dosing schedule. will discuss transition to metoprolol with Dr. Alonso.   Orders:  -     Comprehensive Metabolic Panel  -     CBC & Differential  -     TSH    4. Chronic renal impairment, stage 2 (mild)  -     Comprehensive Metabolic Panel  -     CBC & Differential  -     TSH    5. Cerebrovascular accident (CVA), unspecified mechanism (CMS/HCC)  -     Lipid Panel  -     TSH    6. Impaired fasting glucose  -     Hemoglobin A1c    Other orders  -     aspirin 81 MG chewable tablet; Chew 1 tablet Daily.  Dispense: 90 tablet; Refill: 3  -     atorvastatin (LIPITOR) 80 MG tablet; Take 1 tablet by mouth Daily.  Dispense: 90 tablet; Refill: 3  -     carvedilol (COREG) 25 MG tablet; Take 1 tablet by mouth 2 (Two) Times a Day With Meals.  Dispense: 90 tablet; Refill: 3  -     ezetimibe (ZETIA) 10 MG tablet; Take 1 tablet by mouth Daily for 90 days.  Dispense: 90 tablet; Refill: 3  -     furosemide (LASIX) 40 MG tablet; Take 1 tablet by mouth 2 (Two) Times a Day for 90 days.  Dispense: 180 tablet; Refill: 0  -     rivaroxaban (XARELTO) 20 MG tablet; Take 1 tablet by mouth Daily for 90 days.  Dispense: 90 tablet; Refill: 3  -     amLODIPine (NORVASC) 10 MG tablet; Take 1 tablet by mouth Daily for 90 days.  Dispense: 30 tablet; Refill: 3  -     losartan (Cozaar) 100 MG tablet; Take 1 tablet by mouth Daily for 90 days.  Dispense: 90 tablet; Refill: 3      Follow Up   Return in about 6 months (around 12/7/2021).  Patient was given instructions and counseling regarding his condition or for health maintenance advice. Please see specific information pulled into the AVS if appropriate.

## 2021-07-15 VITALS
SYSTOLIC BLOOD PRESSURE: 124 MMHG | WEIGHT: 302 LBS | DIASTOLIC BLOOD PRESSURE: 80 MMHG | BODY MASS INDEX: 44.73 KG/M2 | HEART RATE: 70 BPM | HEIGHT: 69 IN

## 2021-07-28 ENCOUNTER — HOSPITAL ENCOUNTER (INPATIENT)
Facility: HOSPITAL | Age: 51
LOS: 3 days | Discharge: HOME | End: 2021-07-31
Attending: EMERGENCY MEDICINE | Admitting: INTERNAL MEDICINE

## 2021-07-28 ENCOUNTER — APPOINTMENT (OUTPATIENT)
Dept: GENERAL RADIOLOGY | Facility: HOSPITAL | Age: 51
End: 2021-07-28

## 2021-07-28 DIAGNOSIS — I21.3 ACUTE ST ELEVATION MYOCARDIAL INFARCTION (STEMI), UNSPECIFIED ARTERY (HCC): Primary | ICD-10-CM

## 2021-07-28 DIAGNOSIS — Z95.5 S/P CORONARY ARTERY STENT PLACEMENT: ICD-10-CM

## 2021-07-28 DIAGNOSIS — I10 UNCONTROLLED HYPERTENSION: ICD-10-CM

## 2021-07-28 DIAGNOSIS — I21.02 ACUTE ST ELEVATION MYOCARDIAL INFARCTION (STEMI) DUE TO OCCLUSION OF MID PORTION OF LEFT ANTERIOR DESCENDING (LAD) CORONARY ARTERY (HCC): ICD-10-CM

## 2021-07-28 LAB
ACT BLD: 246 SECONDS (ref 89–137)
ACT BLD: 318 SECONDS (ref 89–137)
ALBUMIN SERPL-MCNC: 3.9 G/DL (ref 3.5–5.2)
ALBUMIN/GLOB SERPL: 1 G/DL
ALP SERPL-CCNC: 70 U/L (ref 39–117)
ALT SERPL W P-5'-P-CCNC: 24 U/L (ref 1–41)
ANION GAP SERPL CALCULATED.3IONS-SCNC: 14.5 MMOL/L (ref 5–15)
APTT PPP: 23.5 SECONDS (ref 22.2–34.2)
AST SERPL-CCNC: 51 U/L (ref 1–40)
BASOPHILS # BLD AUTO: 0.1 10*3/MM3 (ref 0–0.2)
BASOPHILS NFR BLD AUTO: 0.9 % (ref 0–1.5)
BILIRUB SERPL-MCNC: 0.3 MG/DL (ref 0–1.2)
BUN SERPL-MCNC: 13 MG/DL (ref 6–20)
BUN/CREAT SERPL: 12.1 (ref 7–25)
CALCIUM SPEC-SCNC: 8.4 MG/DL (ref 8.6–10.5)
CHLORIDE SERPL-SCNC: 95 MMOL/L (ref 98–107)
CK MB SERPL-CCNC: 29.86 NG/ML
CK SERPL-CCNC: 438 U/L (ref 20–200)
CO2 SERPL-SCNC: 19.5 MMOL/L (ref 22–29)
CREAT SERPL-MCNC: 1.07 MG/DL (ref 0.76–1.27)
DEPRECATED RDW RBC AUTO: 42.1 FL (ref 37–54)
EOSINOPHIL # BLD AUTO: 0.16 10*3/MM3 (ref 0–0.4)
EOSINOPHIL NFR BLD AUTO: 1.4 % (ref 0.3–6.2)
ERYTHROCYTE [DISTWIDTH] IN BLOOD BY AUTOMATED COUNT: 13.4 % (ref 12.3–15.4)
GFR SERPL CREATININE-BSD FRML MDRD: 73 ML/MIN/1.73
GLOBULIN UR ELPH-MCNC: 3.8 GM/DL
GLUCOSE SERPL-MCNC: 158 MG/DL (ref 65–99)
HCT VFR BLD AUTO: 51.7 % (ref 37.5–51)
HGB BLD-MCNC: 17.8 G/DL (ref 13–17.7)
HOLD SPECIMEN: NORMAL
IMM GRANULOCYTES # BLD AUTO: 0.05 10*3/MM3 (ref 0–0.05)
IMM GRANULOCYTES NFR BLD AUTO: 0.4 % (ref 0–0.5)
INR PPP: 0.95 (ref 2–3)
LIPASE SERPL-CCNC: 38 U/L (ref 13–60)
LYMPHOCYTES # BLD AUTO: 2.5 10*3/MM3 (ref 0.7–3.1)
LYMPHOCYTES NFR BLD AUTO: 22.3 % (ref 19.6–45.3)
MAGNESIUM SERPL-MCNC: 1.8 MG/DL (ref 1.6–2.6)
MCH RBC QN AUTO: 29.5 PG (ref 26.6–33)
MCHC RBC AUTO-ENTMCNC: 34.4 G/DL (ref 31.5–35.7)
MCV RBC AUTO: 85.7 FL (ref 79–97)
MONOCYTES # BLD AUTO: 0.88 10*3/MM3 (ref 0.1–0.9)
MONOCYTES NFR BLD AUTO: 7.9 % (ref 5–12)
NEUTROPHILS NFR BLD AUTO: 67.1 % (ref 42.7–76)
NEUTROPHILS NFR BLD AUTO: 7.5 10*3/MM3 (ref 1.7–7)
NRBC BLD AUTO-RTO: 0 /100 WBC (ref 0–0.2)
NT-PROBNP SERPL-MCNC: 532.6 PG/ML (ref 0–450)
PLATELET # BLD AUTO: 320 10*3/MM3 (ref 140–450)
PMV BLD AUTO: 10.3 FL (ref 6–12)
POTASSIUM SERPL-SCNC: 3.4 MMOL/L (ref 3.5–5.2)
PROT SERPL-MCNC: 7.7 G/DL (ref 6–8.5)
PROTHROMBIN TIME: 10.6 SECONDS (ref 9.4–12)
RBC # BLD AUTO: 6.03 10*6/MM3 (ref 4.14–5.8)
SODIUM SERPL-SCNC: 129 MMOL/L (ref 136–145)
TROPONIN I SERPL-MCNC: 0.04 NG/ML (ref 0–0.6)
WBC # BLD AUTO: 11.19 10*3/MM3 (ref 3.4–10.8)
WHOLE BLOOD HOLD SPECIMEN: NORMAL

## 2021-07-28 PROCEDURE — 85730 THROMBOPLASTIN TIME PARTIAL: CPT | Performed by: EMERGENCY MEDICINE

## 2021-07-28 PROCEDURE — 93010 ELECTROCARDIOGRAM REPORT: CPT | Performed by: INTERNAL MEDICINE

## 2021-07-28 PROCEDURE — 99153 MOD SED SAME PHYS/QHP EA: CPT | Performed by: INTERNAL MEDICINE

## 2021-07-28 PROCEDURE — 92941 PRQ TRLML REVSC TOT OCCL AMI: CPT | Performed by: INTERNAL MEDICINE

## 2021-07-28 PROCEDURE — B2151ZZ FLUOROSCOPY OF LEFT HEART USING LOW OSMOLAR CONTRAST: ICD-10-PCS | Performed by: INTERNAL MEDICINE

## 2021-07-28 PROCEDURE — 71045 X-RAY EXAM CHEST 1 VIEW: CPT

## 2021-07-28 PROCEDURE — 25010000002 FENTANYL CITRATE (PF) 100 MCG/2ML SOLUTION: Performed by: INTERNAL MEDICINE

## 2021-07-28 PROCEDURE — C1894 INTRO/SHEATH, NON-LASER: HCPCS | Performed by: INTERNAL MEDICINE

## 2021-07-28 PROCEDURE — C1769 GUIDE WIRE: HCPCS | Performed by: INTERNAL MEDICINE

## 2021-07-28 PROCEDURE — 25010000002 HEPARIN (PORCINE) PER 1000 UNITS: Performed by: EMERGENCY MEDICINE

## 2021-07-28 PROCEDURE — 25010000002 MIDAZOLAM PER 1MG: Performed by: INTERNAL MEDICINE

## 2021-07-28 PROCEDURE — C9606 PERC D-E COR REVASC W AMI S: HCPCS | Performed by: INTERNAL MEDICINE

## 2021-07-28 PROCEDURE — C1887 CATHETER, GUIDING: HCPCS | Performed by: INTERNAL MEDICINE

## 2021-07-28 PROCEDURE — C1757 CATH, THROMBECTOMY/EMBOLECT: HCPCS | Performed by: INTERNAL MEDICINE

## 2021-07-28 PROCEDURE — 25010000002 HEPARIN (PORCINE) PER 1000 UNITS: Performed by: INTERNAL MEDICINE

## 2021-07-28 PROCEDURE — C1874 STENT, COATED/COV W/DEL SYS: HCPCS | Performed by: INTERNAL MEDICINE

## 2021-07-28 PROCEDURE — 4A023N7 MEASUREMENT OF CARDIAC SAMPLING AND PRESSURE, LEFT HEART, PERCUTANEOUS APPROACH: ICD-10-PCS | Performed by: INTERNAL MEDICINE

## 2021-07-28 PROCEDURE — B2111ZZ FLUOROSCOPY OF MULTIPLE CORONARY ARTERIES USING LOW OSMOLAR CONTRAST: ICD-10-PCS | Performed by: INTERNAL MEDICINE

## 2021-07-28 PROCEDURE — 83880 ASSAY OF NATRIURETIC PEPTIDE: CPT | Performed by: EMERGENCY MEDICINE

## 2021-07-28 PROCEDURE — 99284 EMERGENCY DEPT VISIT MOD MDM: CPT

## 2021-07-28 PROCEDURE — 85347 COAGULATION TIME ACTIVATED: CPT

## 2021-07-28 PROCEDURE — C1725 CATH, TRANSLUMIN NON-LASER: HCPCS | Performed by: INTERNAL MEDICINE

## 2021-07-28 PROCEDURE — 93458 L HRT ARTERY/VENTRICLE ANGIO: CPT | Performed by: INTERNAL MEDICINE

## 2021-07-28 PROCEDURE — 83690 ASSAY OF LIPASE: CPT | Performed by: EMERGENCY MEDICINE

## 2021-07-28 PROCEDURE — 0 IOPAMIDOL PER 1 ML: Performed by: INTERNAL MEDICINE

## 2021-07-28 PROCEDURE — 82553 CREATINE MB FRACTION: CPT | Performed by: EMERGENCY MEDICINE

## 2021-07-28 PROCEDURE — 82550 ASSAY OF CK (CPK): CPT | Performed by: EMERGENCY MEDICINE

## 2021-07-28 PROCEDURE — 80053 COMPREHEN METABOLIC PANEL: CPT | Performed by: EMERGENCY MEDICINE

## 2021-07-28 PROCEDURE — 85610 PROTHROMBIN TIME: CPT | Performed by: EMERGENCY MEDICINE

## 2021-07-28 PROCEDURE — 93005 ELECTROCARDIOGRAM TRACING: CPT | Performed by: EMERGENCY MEDICINE

## 2021-07-28 PROCEDURE — 99152 MOD SED SAME PHYS/QHP 5/>YRS: CPT | Performed by: INTERNAL MEDICINE

## 2021-07-28 PROCEDURE — 84484 ASSAY OF TROPONIN QUANT: CPT

## 2021-07-28 PROCEDURE — 99223 1ST HOSP IP/OBS HIGH 75: CPT | Performed by: INTERNAL MEDICINE

## 2021-07-28 PROCEDURE — 83735 ASSAY OF MAGNESIUM: CPT | Performed by: EMERGENCY MEDICINE

## 2021-07-28 PROCEDURE — 027035Z DILATION OF CORONARY ARTERY, ONE ARTERY WITH TWO DRUG-ELUTING INTRALUMINAL DEVICES, PERCUTANEOUS APPROACH: ICD-10-PCS | Performed by: INTERNAL MEDICINE

## 2021-07-28 PROCEDURE — 85025 COMPLETE CBC W/AUTO DIFF WBC: CPT | Performed by: EMERGENCY MEDICINE

## 2021-07-28 DEVICE — XIENCE SIERRA™ EVEROLIMUS ELUTING CORONARY STENT SYSTEM 3.50 MM X 23 MM / RAPID-EXCHANGE
Type: IMPLANTABLE DEVICE | Status: FUNCTIONAL
Brand: XIENCE SIERRA™

## 2021-07-28 DEVICE — XIENCE SIERRA™ EVEROLIMUS ELUTING CORONARY STENT SYSTEM 3.25 MM X 28 MM / RAPID-EXCHANGE
Type: IMPLANTABLE DEVICE | Status: FUNCTIONAL
Brand: XIENCE SIERRA™

## 2021-07-28 RX ORDER — ASPIRIN 81 MG/1
324 TABLET, CHEWABLE ORAL ONCE
Status: DISCONTINUED | OUTPATIENT
Start: 2021-07-28 | End: 2021-07-28 | Stop reason: SDUPTHER

## 2021-07-28 RX ORDER — CLOPIDOGREL BISULFATE 75 MG/1
75 TABLET ORAL DAILY
Status: DISCONTINUED | OUTPATIENT
Start: 2021-07-29 | End: 2021-07-31 | Stop reason: HOSPADM

## 2021-07-28 RX ORDER — ONDANSETRON 2 MG/ML
4 INJECTION INTRAMUSCULAR; INTRAVENOUS EVERY 6 HOURS PRN
Status: DISCONTINUED | OUTPATIENT
Start: 2021-07-28 | End: 2021-07-31 | Stop reason: HOSPADM

## 2021-07-28 RX ORDER — LIDOCAINE HYDROCHLORIDE 20 MG/ML
INJECTION, SOLUTION INFILTRATION; PERINEURAL AS NEEDED
Status: DISCONTINUED | OUTPATIENT
Start: 2021-07-28 | End: 2021-07-28 | Stop reason: HOSPADM

## 2021-07-28 RX ORDER — ACETAMINOPHEN 325 MG/1
650 TABLET ORAL EVERY 4 HOURS PRN
Status: DISCONTINUED | OUTPATIENT
Start: 2021-07-28 | End: 2021-07-31 | Stop reason: HOSPADM

## 2021-07-28 RX ORDER — NITROGLYCERIN 20 MG/100ML
INJECTION INTRAVENOUS
Status: COMPLETED
Start: 2021-07-28 | End: 2021-07-28

## 2021-07-28 RX ORDER — SODIUM CHLORIDE 0.9 % (FLUSH) 0.9 %
10 SYRINGE (ML) INJECTION AS NEEDED
Status: DISCONTINUED | OUTPATIENT
Start: 2021-07-28 | End: 2021-07-31 | Stop reason: HOSPADM

## 2021-07-28 RX ORDER — MIDAZOLAM HYDROCHLORIDE 2 MG/2ML
INJECTION, SOLUTION INTRAMUSCULAR; INTRAVENOUS AS NEEDED
Status: DISCONTINUED | OUTPATIENT
Start: 2021-07-28 | End: 2021-07-28 | Stop reason: HOSPADM

## 2021-07-28 RX ORDER — SODIUM CHLORIDE 9 MG/ML
150 INJECTION, SOLUTION INTRAVENOUS CONTINUOUS
Status: ACTIVE | OUTPATIENT
Start: 2021-07-28 | End: 2021-07-28

## 2021-07-28 RX ORDER — AMLODIPINE BESYLATE 5 MG/1
10 TABLET ORAL DAILY
Status: DISCONTINUED | OUTPATIENT
Start: 2021-07-28 | End: 2021-07-31 | Stop reason: HOSPADM

## 2021-07-28 RX ORDER — SODIUM CHLORIDE 9 MG/ML
INJECTION, SOLUTION INTRAVENOUS CONTINUOUS PRN
Status: COMPLETED | OUTPATIENT
Start: 2021-07-28 | End: 2021-07-28

## 2021-07-28 RX ORDER — NITROGLYCERIN 20 MG/100ML
5-200 INJECTION INTRAVENOUS
Status: DISCONTINUED | OUTPATIENT
Start: 2021-07-28 | End: 2021-07-29

## 2021-07-28 RX ORDER — HEPARIN SODIUM 1000 [USP'U]/ML
INJECTION, SOLUTION INTRAVENOUS; SUBCUTANEOUS AS NEEDED
Status: DISCONTINUED | OUTPATIENT
Start: 2021-07-28 | End: 2021-07-28 | Stop reason: HOSPADM

## 2021-07-28 RX ORDER — ATORVASTATIN CALCIUM 40 MG/1
80 TABLET, FILM COATED ORAL DAILY
Status: DISCONTINUED | OUTPATIENT
Start: 2021-07-28 | End: 2021-07-31 | Stop reason: HOSPADM

## 2021-07-28 RX ORDER — CARVEDILOL 12.5 MG/1
25 TABLET ORAL 2 TIMES DAILY WITH MEALS
Status: DISCONTINUED | OUTPATIENT
Start: 2021-07-28 | End: 2021-07-31 | Stop reason: HOSPADM

## 2021-07-28 RX ORDER — ONDANSETRON 4 MG/1
4 TABLET, FILM COATED ORAL EVERY 6 HOURS PRN
Status: DISCONTINUED | OUTPATIENT
Start: 2021-07-28 | End: 2021-07-31 | Stop reason: HOSPADM

## 2021-07-28 RX ORDER — NITROGLYCERIN 5 MG/ML
INJECTION, SOLUTION INTRAVENOUS AS NEEDED
Status: DISCONTINUED | OUTPATIENT
Start: 2021-07-28 | End: 2021-07-28 | Stop reason: HOSPADM

## 2021-07-28 RX ORDER — FENTANYL CITRATE 50 UG/ML
INJECTION, SOLUTION INTRAMUSCULAR; INTRAVENOUS AS NEEDED
Status: DISCONTINUED | OUTPATIENT
Start: 2021-07-28 | End: 2021-07-28 | Stop reason: HOSPADM

## 2021-07-28 RX ORDER — NALOXONE HCL 0.4 MG/ML
0.4 VIAL (ML) INJECTION
Status: DISCONTINUED | OUTPATIENT
Start: 2021-07-28 | End: 2021-07-31 | Stop reason: HOSPADM

## 2021-07-28 RX ORDER — MORPHINE SULFATE 2 MG/ML
1 INJECTION, SOLUTION INTRAMUSCULAR; INTRAVENOUS EVERY 4 HOURS PRN
Status: ACTIVE | OUTPATIENT
Start: 2021-07-28 | End: 2021-07-29

## 2021-07-28 RX ORDER — VERAPAMIL HYDROCHLORIDE 2.5 MG/ML
INJECTION, SOLUTION INTRAVENOUS AS NEEDED
Status: DISCONTINUED | OUTPATIENT
Start: 2021-07-28 | End: 2021-07-28 | Stop reason: HOSPADM

## 2021-07-28 RX ORDER — ASPIRIN 81 MG/1
TABLET, CHEWABLE ORAL
Status: COMPLETED | OUTPATIENT
Start: 2021-07-28 | End: 2021-07-28

## 2021-07-28 RX ORDER — HEPARIN SODIUM 5000 [USP'U]/ML
INJECTION, SOLUTION INTRAVENOUS; SUBCUTANEOUS
Status: COMPLETED | OUTPATIENT
Start: 2021-07-28 | End: 2021-07-28

## 2021-07-28 RX ORDER — ATORVASTATIN CALCIUM 40 MG/1
40 TABLET, FILM COATED ORAL ONCE
Status: COMPLETED | OUTPATIENT
Start: 2021-07-28 | End: 2021-07-28

## 2021-07-28 RX ORDER — CLOPIDOGREL BISULFATE 75 MG/1
TABLET ORAL AS NEEDED
Status: DISCONTINUED | OUTPATIENT
Start: 2021-07-28 | End: 2021-07-28 | Stop reason: HOSPADM

## 2021-07-28 RX ADMIN — HEPARIN SODIUM 5000 UNITS: 5000 INJECTION INTRAVENOUS; SUBCUTANEOUS at 15:50

## 2021-07-28 RX ADMIN — NITROGLYCERIN 50000 MCG: 20 INJECTION INTRAVENOUS at 15:42

## 2021-07-28 RX ADMIN — ATORVASTATIN CALCIUM 80 MG: 40 TABLET, FILM COATED ORAL at 17:35

## 2021-07-28 RX ADMIN — ASPIRIN 81 MG 162 MG: 81 TABLET ORAL at 15:54

## 2021-07-28 RX ADMIN — CARVEDILOL 25 MG: 25 TABLET, FILM COATED ORAL at 17:35

## 2021-07-28 RX ADMIN — ATORVASTATIN CALCIUM 40 MG: 40 TABLET, FILM COATED ORAL at 15:52

## 2021-07-28 RX ADMIN — NITROGLYCERIN 5 MCG/MIN: 20 INJECTION INTRAVENOUS at 23:08

## 2021-07-28 RX ADMIN — AMLODIPINE BESYLATE 10 MG: 10 TABLET ORAL at 17:49

## 2021-07-28 RX ADMIN — SODIUM CHLORIDE 150 ML/HR: 9 INJECTION, SOLUTION INTRAVENOUS at 17:36

## 2021-07-29 LAB
ANION GAP SERPL CALCULATED.3IONS-SCNC: 13.9 MMOL/L (ref 5–15)
BUN SERPL-MCNC: 14 MG/DL (ref 6–20)
BUN/CREAT SERPL: 11.5 (ref 7–25)
CALCIUM SPEC-SCNC: 9 MG/DL (ref 8.6–10.5)
CHLORIDE SERPL-SCNC: 102 MMOL/L (ref 98–107)
CO2 SERPL-SCNC: 21.1 MMOL/L (ref 22–29)
CREAT SERPL-MCNC: 1.22 MG/DL (ref 0.76–1.27)
DEPRECATED RDW RBC AUTO: 42.4 FL (ref 37–54)
ERYTHROCYTE [DISTWIDTH] IN BLOOD BY AUTOMATED COUNT: 13.7 % (ref 12.3–15.4)
GFR SERPL CREATININE-BSD FRML MDRD: 63 ML/MIN/1.73
GLUCOSE SERPL-MCNC: 130 MG/DL (ref 65–99)
HCT VFR BLD AUTO: 46.7 % (ref 37.5–51)
HGB BLD-MCNC: 15.7 G/DL (ref 13–17.7)
MCH RBC QN AUTO: 28.8 PG (ref 26.6–33)
MCHC RBC AUTO-ENTMCNC: 33.6 G/DL (ref 31.5–35.7)
MCV RBC AUTO: 85.7 FL (ref 79–97)
PLATELET # BLD AUTO: 306 10*3/MM3 (ref 140–450)
PMV BLD AUTO: 10.1 FL (ref 6–12)
POTASSIUM SERPL-SCNC: 3.7 MMOL/L (ref 3.5–5.2)
RBC # BLD AUTO: 5.45 10*6/MM3 (ref 4.14–5.8)
SODIUM SERPL-SCNC: 137 MMOL/L (ref 136–145)
WBC # BLD AUTO: 15.43 10*3/MM3 (ref 3.4–10.8)

## 2021-07-29 PROCEDURE — 93005 ELECTROCARDIOGRAM TRACING: CPT | Performed by: INTERNAL MEDICINE

## 2021-07-29 PROCEDURE — 85027 COMPLETE CBC AUTOMATED: CPT | Performed by: INTERNAL MEDICINE

## 2021-07-29 PROCEDURE — 99233 SBSQ HOSP IP/OBS HIGH 50: CPT | Performed by: INTERNAL MEDICINE

## 2021-07-29 PROCEDURE — 80048 BASIC METABOLIC PNL TOTAL CA: CPT | Performed by: INTERNAL MEDICINE

## 2021-07-29 PROCEDURE — 93010 ELECTROCARDIOGRAM REPORT: CPT | Performed by: INTERNAL MEDICINE

## 2021-07-29 RX ORDER — ASPIRIN 81 MG/1
81 TABLET, CHEWABLE ORAL DAILY
Status: DISCONTINUED | OUTPATIENT
Start: 2021-07-29 | End: 2021-07-31 | Stop reason: HOSPADM

## 2021-07-29 RX ADMIN — CARVEDILOL 25 MG: 25 TABLET, FILM COATED ORAL at 08:58

## 2021-07-29 RX ADMIN — ATORVASTATIN CALCIUM 80 MG: 40 TABLET, FILM COATED ORAL at 08:58

## 2021-07-29 RX ADMIN — CARVEDILOL 25 MG: 25 TABLET, FILM COATED ORAL at 18:35

## 2021-07-29 RX ADMIN — AMLODIPINE BESYLATE 10 MG: 10 TABLET ORAL at 08:58

## 2021-07-29 RX ADMIN — ASPIRIN 81 MG CHEWABLE TABLET 81 MG: 81 TABLET CHEWABLE at 18:35

## 2021-07-29 RX ADMIN — RIVAROXABAN 20 MG: 20 TABLET, FILM COATED ORAL at 08:58

## 2021-07-29 RX ADMIN — CLOPIDOGREL BISULFATE 75 MG: 75 TABLET ORAL at 08:58

## 2021-07-30 LAB
ANION GAP SERPL CALCULATED.3IONS-SCNC: 9.7 MMOL/L (ref 5–15)
BUN SERPL-MCNC: 14 MG/DL (ref 6–20)
BUN/CREAT SERPL: 11.7 (ref 7–25)
CALCIUM SPEC-SCNC: 8.5 MG/DL (ref 8.6–10.5)
CHLORIDE SERPL-SCNC: 103 MMOL/L (ref 98–107)
CHOLEST SERPL-MCNC: 166 MG/DL (ref 0–200)
CO2 SERPL-SCNC: 23.3 MMOL/L (ref 22–29)
CREAT SERPL-MCNC: 1.2 MG/DL (ref 0.76–1.27)
DEPRECATED RDW RBC AUTO: 43.1 FL (ref 37–54)
ERYTHROCYTE [DISTWIDTH] IN BLOOD BY AUTOMATED COUNT: 13.6 % (ref 12.3–15.4)
GFR SERPL CREATININE-BSD FRML MDRD: 64 ML/MIN/1.73
GLUCOSE SERPL-MCNC: 117 MG/DL (ref 65–99)
HCT VFR BLD AUTO: 44.3 % (ref 37.5–51)
HDLC SERPL-MCNC: 29 MG/DL (ref 40–60)
HGB BLD-MCNC: 15.1 G/DL (ref 13–17.7)
LDLC SERPL CALC-MCNC: 97 MG/DL (ref 0–100)
LDLC/HDLC SERPL: 3.13 {RATIO}
MCH RBC QN AUTO: 29.3 PG (ref 26.6–33)
MCHC RBC AUTO-ENTMCNC: 34.1 G/DL (ref 31.5–35.7)
MCV RBC AUTO: 86 FL (ref 79–97)
PLATELET # BLD AUTO: 251 10*3/MM3 (ref 140–450)
PMV BLD AUTO: 10.4 FL (ref 6–12)
POTASSIUM SERPL-SCNC: 3.5 MMOL/L (ref 3.5–5.2)
RBC # BLD AUTO: 5.15 10*6/MM3 (ref 4.14–5.8)
SODIUM SERPL-SCNC: 136 MMOL/L (ref 136–145)
TRIGL SERPL-MCNC: 231 MG/DL (ref 0–150)
VLDLC SERPL-MCNC: 40 MG/DL (ref 5–40)
WBC # BLD AUTO: 11.08 10*3/MM3 (ref 3.4–10.8)

## 2021-07-30 PROCEDURE — 80061 LIPID PANEL: CPT | Performed by: INTERNAL MEDICINE

## 2021-07-30 PROCEDURE — 99232 SBSQ HOSP IP/OBS MODERATE 35: CPT | Performed by: INTERNAL MEDICINE

## 2021-07-30 PROCEDURE — 80048 BASIC METABOLIC PNL TOTAL CA: CPT | Performed by: INTERNAL MEDICINE

## 2021-07-30 PROCEDURE — 85027 COMPLETE CBC AUTOMATED: CPT | Performed by: INTERNAL MEDICINE

## 2021-07-30 RX ORDER — LISINOPRIL 5 MG/1
5 TABLET ORAL
Status: DISCONTINUED | OUTPATIENT
Start: 2021-07-30 | End: 2021-07-31

## 2021-07-30 RX ADMIN — RIVAROXABAN 20 MG: 20 TABLET, FILM COATED ORAL at 08:29

## 2021-07-30 RX ADMIN — LISINOPRIL 5 MG: 5 TABLET ORAL at 10:10

## 2021-07-30 RX ADMIN — ASPIRIN 81 MG CHEWABLE TABLET 81 MG: 81 TABLET CHEWABLE at 08:29

## 2021-07-30 RX ADMIN — CARVEDILOL 25 MG: 25 TABLET, FILM COATED ORAL at 17:55

## 2021-07-30 RX ADMIN — AMLODIPINE BESYLATE 10 MG: 10 TABLET ORAL at 08:28

## 2021-07-30 RX ADMIN — SODIUM CHLORIDE, PRESERVATIVE FREE 10 ML: 5 INJECTION INTRAVENOUS at 08:29

## 2021-07-30 RX ADMIN — ATORVASTATIN CALCIUM 80 MG: 40 TABLET, FILM COATED ORAL at 08:29

## 2021-07-30 RX ADMIN — CLOPIDOGREL BISULFATE 75 MG: 75 TABLET ORAL at 08:29

## 2021-07-30 RX ADMIN — CARVEDILOL 25 MG: 25 TABLET, FILM COATED ORAL at 08:29

## 2021-07-31 VITALS
BODY MASS INDEX: 44.67 KG/M2 | TEMPERATURE: 97.88 F | HEART RATE: 75 BPM | DIASTOLIC BLOOD PRESSURE: 98 MMHG | HEIGHT: 69 IN | RESPIRATION RATE: 20 BRPM | OXYGEN SATURATION: 95 % | WEIGHT: 301.59 LBS | SYSTOLIC BLOOD PRESSURE: 143 MMHG

## 2021-07-31 LAB
QT INTERVAL: 385 MS
QT INTERVAL: 436 MS

## 2021-07-31 PROCEDURE — 99238 HOSP IP/OBS DSCHRG MGMT 30/<: CPT | Performed by: INTERNAL MEDICINE

## 2021-07-31 RX ORDER — LISINOPRIL 10 MG/1
10 TABLET ORAL
Status: DISCONTINUED | OUTPATIENT
Start: 2021-08-01 | End: 2021-07-31 | Stop reason: HOSPADM

## 2021-07-31 RX ORDER — LISINOPRIL 10 MG/1
10 TABLET ORAL
Qty: 30 TABLET | Refills: 3 | Status: SHIPPED | OUTPATIENT
Start: 2021-08-01 | End: 2021-08-09 | Stop reason: SDUPTHER

## 2021-07-31 RX ORDER — CLOPIDOGREL BISULFATE 75 MG/1
75 TABLET ORAL DAILY
Qty: 30 TABLET | Refills: 3 | Status: SHIPPED | OUTPATIENT
Start: 2021-08-01 | End: 2021-08-09 | Stop reason: SDUPTHER

## 2021-07-31 RX ADMIN — AMLODIPINE BESYLATE 10 MG: 10 TABLET ORAL at 08:35

## 2021-07-31 RX ADMIN — ASPIRIN 81 MG CHEWABLE TABLET 81 MG: 81 TABLET CHEWABLE at 08:35

## 2021-07-31 RX ADMIN — RIVAROXABAN 20 MG: 20 TABLET, FILM COATED ORAL at 08:35

## 2021-07-31 RX ADMIN — CLOPIDOGREL BISULFATE 75 MG: 75 TABLET ORAL at 08:35

## 2021-07-31 RX ADMIN — LISINOPRIL 5 MG: 5 TABLET ORAL at 08:35

## 2021-07-31 RX ADMIN — SODIUM CHLORIDE, PRESERVATIVE FREE 10 ML: 5 INJECTION INTRAVENOUS at 08:35

## 2021-07-31 RX ADMIN — CARVEDILOL 25 MG: 25 TABLET, FILM COATED ORAL at 08:35

## 2021-07-31 RX ADMIN — ATORVASTATIN CALCIUM 80 MG: 40 TABLET, FILM COATED ORAL at 08:35

## 2021-08-01 ENCOUNTER — READMISSION MANAGEMENT (OUTPATIENT)
Dept: CALL CENTER | Facility: HOSPITAL | Age: 51
End: 2021-08-01

## 2021-08-01 NOTE — OUTREACH NOTE
Prep Survey      Responses   [facility] patient discharged from?  [facility]   Is LACE score < 7 ?  No   Emergency Room discharge w/ pulse ox?  No   Eligibility  Readm Mgmt   Discharge diagnosis  Acute ST elevation myocardial infarction    Does the patient have one of the following disease processes/diagnoses(primary or secondary)?  Acute MI (STEMI,NSTEMI)   Does the patient have Home health ordered?  No   Is there a DME ordered?  No   Prep survey completed?  Yes          Joyce Nielson RN

## 2021-08-05 ENCOUNTER — READMISSION MANAGEMENT (OUTPATIENT)
Dept: CALL CENTER | Facility: HOSPITAL | Age: 51
End: 2021-08-05

## 2021-08-05 NOTE — OUTREACH NOTE
AMI Week 1 Survey      Responses   [facility] patient discharged from?  [facility]   Does the patient have one of the following disease processes/diagnoses(primary or secondary)?  Acute MI (STEMI,NSTEMI)   Week 1 attempt successful?  Yes   Call start time  1409   Call end time  1413   Discharge diagnosis  Acute ST elevation myocardial infarction    Is patient permission given to speak with other caregiver?  Yes   Person spoke with today (if not patient) and relationship  Annette/wife   Meds reviewed with patient/caregiver?  Yes   Is the patient having any side effects they believe may be caused by any medication additions or changes?  No   Does the patient have all prescriptions related to this admission filled (includes statins,anticoagulants,HTN meds,anti-arrhythmia meds)  Yes   Is the patient taking all medications as directed (includes completed medication regime)?  Yes   Does the patient have a primary care provider?   Yes   Does the patient have an appointment with their PCP,cardiologist,or clinic within 7 days of discharge?  Yes   Has the patient kept scheduled appointments due by today?  Yes   Has home health visited the patient within 72 hours of discharge?  N/A   Has all DME been delivered?  No   Psychosocial issues?  No   Did the patient receive a copy of their discharge instructions?  Yes   Nursing interventions  Reviewed instructions with patient   What is the patient's perception of their health status since discharge?  Improving   Nursing interventions  Nurse provided patient education   Is the patient/caregiver able to teach back signs and symptoms of when to call for help immediately:  Sudden chest discomfort, Sudden discomfort in arms, back, neck or jaw, Shortness of breath at any time, Sudden sweating or clammy skin, Nausea or vomiting, Dizziness or lightheadedness, Irregular or rapid heart rate   Nursing interventions  Nurse provided patient education   Is the pateint /caregiver able to teach back  the importance of cardiac rehab?  Yes   Is the patient/caregiver able to teach back lifestyle changes to help prevent MIs  Reducing stress, Maintaining a healthy weight, Heart healthy diet   Is the patient/caregiver able to teach back ways to prevent a second heart attack:  Take medications, Follow up with MD, Manage risk factors   If the patient is a current smoker, are they able to teach back resources for cessation?  Smoking cessation medications   Week 1 call completed?  Yes          Shady Odell RN

## 2021-08-06 PROBLEM — S09.90XA HEAD INJURY: Status: ACTIVE | Noted: 2021-08-06

## 2021-08-06 PROBLEM — T14.8XXA BROKEN BONES: Status: ACTIVE | Noted: 2021-08-06

## 2021-08-09 ENCOUNTER — LAB (OUTPATIENT)
Dept: LAB | Facility: HOSPITAL | Age: 51
End: 2021-08-09

## 2021-08-09 ENCOUNTER — OFFICE VISIT (OUTPATIENT)
Dept: INTERNAL MEDICINE | Facility: CLINIC | Age: 51
End: 2021-08-09

## 2021-08-09 ENCOUNTER — OFFICE VISIT (OUTPATIENT)
Dept: CARDIOLOGY | Facility: CLINIC | Age: 51
End: 2021-08-09

## 2021-08-09 VITALS
WEIGHT: 298 LBS | HEIGHT: 69 IN | HEART RATE: 78 BPM | DIASTOLIC BLOOD PRESSURE: 82 MMHG | SYSTOLIC BLOOD PRESSURE: 120 MMHG | BODY MASS INDEX: 44.14 KG/M2

## 2021-08-09 VITALS
HEIGHT: 69 IN | WEIGHT: 297.4 LBS | DIASTOLIC BLOOD PRESSURE: 83 MMHG | TEMPERATURE: 97.6 F | SYSTOLIC BLOOD PRESSURE: 123 MMHG | BODY MASS INDEX: 44.05 KG/M2 | OXYGEN SATURATION: 97 % | HEART RATE: 71 BPM

## 2021-08-09 DIAGNOSIS — I21.02 ACUTE ST ELEVATION MYOCARDIAL INFARCTION (STEMI) DUE TO OCCLUSION OF MID PORTION OF LEFT ANTERIOR DESCENDING (LAD) CORONARY ARTERY (HCC): Primary | ICD-10-CM

## 2021-08-09 DIAGNOSIS — I10 ESSENTIAL HYPERTENSION: ICD-10-CM

## 2021-08-09 DIAGNOSIS — I48.0 PAROXYSMAL ATRIAL FIBRILLATION (HCC): ICD-10-CM

## 2021-08-09 DIAGNOSIS — E78.2 MIXED HYPERLIPIDEMIA: ICD-10-CM

## 2021-08-09 DIAGNOSIS — Z95.5 S/P CORONARY ARTERY STENT PLACEMENT: ICD-10-CM

## 2021-08-09 DIAGNOSIS — I48.20 CHRONIC ATRIAL FIBRILLATION (HCC): Primary | ICD-10-CM

## 2021-08-09 DIAGNOSIS — R73.01 IMPAIRED FASTING GLUCOSE: Chronic | ICD-10-CM

## 2021-08-09 DIAGNOSIS — I50.9 CHRONIC CONGESTIVE HEART FAILURE, UNSPECIFIED HEART FAILURE TYPE (HCC): ICD-10-CM

## 2021-08-09 PROBLEM — I50.22 CHRONIC SYSTOLIC CHF (CONGESTIVE HEART FAILURE): Status: ACTIVE | Noted: 2021-08-09

## 2021-08-09 LAB
ALBUMIN SERPL-MCNC: 4 G/DL (ref 3.5–5.2)
ALBUMIN/GLOB SERPL: 1.1 G/DL
ALP SERPL-CCNC: 57 U/L (ref 39–117)
ALT SERPL W P-5'-P-CCNC: 15 U/L (ref 1–41)
ANION GAP SERPL CALCULATED.3IONS-SCNC: 10.2 MMOL/L (ref 5–15)
AST SERPL-CCNC: 18 U/L (ref 1–40)
BASOPHILS # BLD AUTO: 0.11 10*3/MM3 (ref 0–0.2)
BASOPHILS NFR BLD AUTO: 0.8 % (ref 0–1.5)
BILIRUB SERPL-MCNC: 0.5 MG/DL (ref 0–1.2)
BUN SERPL-MCNC: 15 MG/DL (ref 6–20)
BUN/CREAT SERPL: 10.7 (ref 7–25)
CALCIUM SPEC-SCNC: 9.2 MG/DL (ref 8.6–10.5)
CHLORIDE SERPL-SCNC: 103 MMOL/L (ref 98–107)
CO2 SERPL-SCNC: 26.8 MMOL/L (ref 22–29)
CREAT SERPL-MCNC: 1.4 MG/DL (ref 0.76–1.27)
DEPRECATED RDW RBC AUTO: 40.6 FL (ref 37–54)
EOSINOPHIL # BLD AUTO: 0.25 10*3/MM3 (ref 0–0.4)
EOSINOPHIL NFR BLD AUTO: 1.9 % (ref 0.3–6.2)
ERYTHROCYTE [DISTWIDTH] IN BLOOD BY AUTOMATED COUNT: 12.9 % (ref 12.3–15.4)
GFR SERPL CREATININE-BSD FRML MDRD: 54 ML/MIN/1.73
GLOBULIN UR ELPH-MCNC: 3.6 GM/DL
GLUCOSE SERPL-MCNC: 164 MG/DL (ref 65–99)
HCT VFR BLD AUTO: 42.4 % (ref 37.5–51)
HGB BLD-MCNC: 14.3 G/DL (ref 13–17.7)
IMM GRANULOCYTES # BLD AUTO: 0.05 10*3/MM3 (ref 0–0.05)
IMM GRANULOCYTES NFR BLD AUTO: 0.4 % (ref 0–0.5)
LYMPHOCYTES # BLD AUTO: 2.65 10*3/MM3 (ref 0.7–3.1)
LYMPHOCYTES NFR BLD AUTO: 20.1 % (ref 19.6–45.3)
MCH RBC QN AUTO: 29.4 PG (ref 26.6–33)
MCHC RBC AUTO-ENTMCNC: 33.7 G/DL (ref 31.5–35.7)
MCV RBC AUTO: 87.1 FL (ref 79–97)
MONOCYTES # BLD AUTO: 1.36 10*3/MM3 (ref 0.1–0.9)
MONOCYTES NFR BLD AUTO: 10.3 % (ref 5–12)
NEUTROPHILS NFR BLD AUTO: 66.5 % (ref 42.7–76)
NEUTROPHILS NFR BLD AUTO: 8.79 10*3/MM3 (ref 1.7–7)
NRBC BLD AUTO-RTO: 0 /100 WBC (ref 0–0.2)
NT-PROBNP SERPL-MCNC: 966.4 PG/ML (ref 0–450)
PLATELET # BLD AUTO: 377 10*3/MM3 (ref 140–450)
PMV BLD AUTO: 10.6 FL (ref 6–12)
POTASSIUM SERPL-SCNC: 4.2 MMOL/L (ref 3.5–5.2)
PROT SERPL-MCNC: 7.6 G/DL (ref 6–8.5)
RBC # BLD AUTO: 4.87 10*6/MM3 (ref 4.14–5.8)
SODIUM SERPL-SCNC: 140 MMOL/L (ref 136–145)
WBC # BLD AUTO: 13.21 10*3/MM3 (ref 3.4–10.8)

## 2021-08-09 PROCEDURE — 99214 OFFICE O/P EST MOD 30 MIN: CPT | Performed by: NURSE PRACTITIONER

## 2021-08-09 PROCEDURE — 83880 ASSAY OF NATRIURETIC PEPTIDE: CPT | Performed by: NURSE PRACTITIONER

## 2021-08-09 PROCEDURE — 36415 COLL VENOUS BLD VENIPUNCTURE: CPT | Performed by: NURSE PRACTITIONER

## 2021-08-09 PROCEDURE — 80053 COMPREHEN METABOLIC PANEL: CPT | Performed by: NURSE PRACTITIONER

## 2021-08-09 PROCEDURE — 85025 COMPLETE CBC W/AUTO DIFF WBC: CPT | Performed by: NURSE PRACTITIONER

## 2021-08-09 RX ORDER — AMLODIPINE BESYLATE 10 MG/1
10 TABLET ORAL DAILY
Qty: 90 TABLET | Refills: 3 | Status: SHIPPED | OUTPATIENT
Start: 2021-08-09 | End: 2021-11-07

## 2021-08-09 RX ORDER — LISINOPRIL 10 MG/1
10 TABLET ORAL
Qty: 90 TABLET | Refills: 3 | Status: SHIPPED | OUTPATIENT
Start: 2021-08-09 | End: 2021-11-23 | Stop reason: SDUPTHER

## 2021-08-09 RX ORDER — ATORVASTATIN CALCIUM 80 MG/1
80 TABLET, FILM COATED ORAL DAILY
Qty: 90 TABLET | Refills: 3 | Status: SHIPPED | OUTPATIENT
Start: 2021-08-09 | End: 2021-11-23 | Stop reason: SDUPTHER

## 2021-08-09 RX ORDER — CLOPIDOGREL BISULFATE 75 MG/1
75 TABLET ORAL DAILY
Qty: 90 TABLET | Refills: 3 | Status: SHIPPED | OUTPATIENT
Start: 2021-08-09 | End: 2021-11-23 | Stop reason: SDUPTHER

## 2021-08-09 RX ORDER — FUROSEMIDE 40 MG/1
40 TABLET ORAL 2 TIMES DAILY
Qty: 180 TABLET | Refills: 3 | Status: SHIPPED | OUTPATIENT
Start: 2021-08-09 | End: 2023-03-08 | Stop reason: SDUPTHER

## 2021-08-09 RX ORDER — EZETIMIBE 10 MG/1
10 TABLET ORAL DAILY
Qty: 90 TABLET | Refills: 3 | Status: SHIPPED | OUTPATIENT
Start: 2021-08-09 | End: 2023-03-08 | Stop reason: SDUPTHER

## 2021-08-09 RX ORDER — CARVEDILOL 25 MG/1
25 TABLET ORAL 2 TIMES DAILY WITH MEALS
Qty: 180 TABLET | Refills: 3 | Status: SHIPPED | OUTPATIENT
Start: 2021-08-09 | End: 2021-11-23 | Stop reason: SDUPTHER

## 2021-08-09 NOTE — PROGRESS NOTES
Transitional Care Follow Up Visit     Patient Name: Yefri Mcgee  : 1970   MRN: [id number]     Chief Complaint:    Chief Complaint   Patient presents with   • Follow-up     heartn attack    • Chronic Atrial Fibrillation   • medication question     wants to discuss medication that he is on        History of Present Illness: Yefri Mcgee is a 50 y.o. male who presents today for transitional care management visit.  The patient was contacted by our office within 48 hours after the discharge of the patient via telephone to coordinate their follow up care and needs.     Subjective      Review of Systems:   Review of Systems   Constitutional: Negative for chills and fever.   HENT: Negative for rhinorrhea and sore throat.    Respiratory: Positive for cough. Negative for shortness of breath.    Cardiovascular: Negative for chest pain and leg swelling.   Gastrointestinal: Negative for abdominal pain, constipation, diarrhea, nausea and vomiting.   Genitourinary: Negative for dysuria.   Skin: Negative for rash.   Neurological: Negative for dizziness and headache.       I reviewed and discussed the details of that call along with the discharge summary, hospital problems, inpatient lab results, inpatient diagnostic studies, and consultation reports with Yefri Mcgee.     Current outpatient and discharge medications have been reconciled for the patient.  No flowsheet data found.    Medications:     Current Outpatient Medications:   •  amLODIPine (NORVASC) 10 MG tablet, Take 1 tablet by mouth Daily for 90 days., Disp: 30 tablet, Rfl: 3  •  aspirin 81 MG chewable tablet, Chew 1 tablet Daily., Disp: 90 tablet, Rfl: 3  •  atorvastatin (LIPITOR) 80 MG tablet, Take 1 tablet by mouth Daily., Disp: 90 tablet, Rfl: 3  •  carvedilol (COREG) 25 MG tablet, Take 1 tablet by mouth 2 (Two) Times a Day With Meals., Disp: 90 tablet, Rfl: 3  •  clopidogrel (PLAVIX) 75 MG tablet, Take 1 tablet by mouth Daily., Disp: 30 tablet,  "Rfl: 3  •  ezetimibe (ZETIA) 10 MG tablet, Take 1 tablet by mouth Daily for 90 days., Disp: 90 tablet, Rfl: 3  •  furosemide (LASIX) 40 MG tablet, Take 1 tablet by mouth 2 (Two) Times a Day for 90 days. (Patient taking differently: Take 40 mg by mouth Daily.), Disp: 180 tablet, Rfl: 0  •  lisinopril (PRINIVIL,ZESTRIL) 10 MG tablet, Take 1 tablet by mouth Daily., Disp: 30 tablet, Rfl: 3  •  rivaroxaban (XARELTO) 20 MG tablet, Take 1 tablet by mouth Daily for 90 days., Disp: 90 tablet, Rfl: 3    Allergies:   Allergies   Allergen Reactions   • Eliquis [Apixaban] Paresthesia       Hospital Course Information:  Risk for Readmission (LACE) Score: 10 (7/31/2021  6:01 AM)       Course During Hospital Stay:   previous PCI in 2014, proximal atrial fibrillation, hypertension, morbid obesity hyperlipidemia chronic kidney stage III and tobacco use.  he went to ER with chest pain and EKG showed ST elevation in anterior leads indicative of anterior STEMI.  He underwent emergent cardiac catheterization was found to have occluded mid LAD artery due to very late thrombosis of the previously placed stent.  He underwent aspiration thrombectomy and angioplasty with drug-eluting stents.  LV ejection fraction was noted to be 35% per LV gram.  He was started on dual alpha therapy with aspirin and Plavix.  Xarelto was restarted the next day.  He has not had any chest pain episodes since discharged    Objective     Physical Exam:  Vital Signs:   Vitals:    08/09/21 0818   BP: 123/83   BP Location: Left arm   Patient Position: Sitting   Cuff Size: Adult   Pulse: 71   Temp: 97.6 °F (36.4 °C)   TempSrc: Temporal   SpO2: 97%   Weight: 135 kg (297 lb 6.4 oz)   Height: 175.3 cm (69\")     Body mass index is 43.92 kg/m².     Physical Exam  HENT:      Right Ear: Tympanic membrane normal.      Left Ear: Tympanic membrane normal.      Nose: Nose normal.      Mouth/Throat:      Mouth: Mucous membranes are moist.   Eyes:      Pupils: Pupils are equal, " round, and reactive to light.   Neck:      Vascular: No carotid bruit.   Cardiovascular:      Rate and Rhythm: Normal rate and regular rhythm.      Heart sounds: Normal heart sounds. No murmur heard.     Pulmonary:      Effort: Pulmonary effort is normal.      Breath sounds: Normal breath sounds.   Abdominal:      General: Bowel sounds are normal.      Palpations: Abdomen is soft.   Musculoskeletal:         General: No signs of injury.      Cervical back: Neck supple.      Left lower leg: No edema.   Skin:     General: Skin is warm and dry.      Capillary Refill: Capillary refill takes less than 2 seconds.   Neurological:      Mental Status: He is alert and oriented to person, place, and time.   Psychiatric:         Mood and Affect: Mood normal.         Behavior: Behavior normal.         Assessment / Plan      Assessment/Plan:   Diagnoses and all orders for this visit:    1. Chronic atrial fibrillation (CMS/HCC) (Primary)  -     BNP  -     CBC Auto Differential  -     Comprehensive Metabolic Panel    2. Mixed hyperlipidemia  -     BNP  -     CBC Auto Differential  -     Comprehensive Metabolic Panel    3. Essential hypertension  -     BNP  -     CBC Auto Differential  -     Comprehensive Metabolic Panel    4. S/P coronary artery stent placement  -     BNP  -     CBC Auto Differential  -     Comprehensive Metabolic Panel    5. Impaired fasting glucose  -     BNP  -     CBC Auto Differential  -     Comprehensive Metabolic Panel    6. Chronic congestive heart failure, unspecified heart failure type (CMS/HCC)         Will obtain BNP to monitor CHF exacerbation, patient is concerned about taking lasix daily, will obtain cmp to monitor renal function and potassium levels, will obtain cbc to recheck elevated white count during hospitalization  Pt to keep follow up with cardiology dr mack scheduled for today   Keep follow up with PCP dr hubbard scheduled in 4 months     Follow Up:   Return in about 4 months (around  "12/9/2021).      Sherwin Tavares, APRN    \"Please note that portions of this note were completed with a voice recognition program.\"      *Note: 73466 is for high complexity patients with a face to face visit within 7 days of discharge.  [id number] is for high complexity patients with a face to face on days 8-14 post discharge or medium complexity with face to face visit within 14 days post discharge.  "

## 2021-08-09 NOTE — PROGRESS NOTES
Chief Complaint  Hospital Follow Up Visit    Subjective            History of Present Illness  Yefri Mcgee is a 50-year-old male patient who presents to the office today for hospital follow-up.  He was admitted to [facility] from 7/28/2021 to 7/31/2021 for acute STEMI due to occlusion of midportion of LAD.  He has history of previous PCI in 2014, paroxysmal atrial fibrillation, essential hypertension, and hyperlipidemia.  He underwent emergent cardiac cath on 7/28/2021 and was found to have occluded mid LAD due to very late thrombosis of previously placed stent and underwent aspiration thrombectomy.  The cardiac cath also showed moderately reduced left ventricular systolic function with EF of 35%.  He was prescribed aspirin and Plavix daily as well as Xarelto daily.  Today he denies any chest pain, shortness of breath, lightheadedness, dizziness, or palpitations.  He reports compliance with all of his medications.  He reports that he has not heard heard anything from cardiac rehab yet and is ready to start.  He has been going up and down steps at home and walking outside without any issue.    PMH  Past Medical History:   Diagnosis Date   • Broken bones    • Chronic kidney disease    • Chronic systolic CHF 8/9/2021 07/28/2021 Heart Cath: Moderately reduced left ventricular systolic function with an estimated ejection fraction of 35%    • Head injury    • Hyperlipidemia    • Hypertension    • Paroxysmal atrial fibrillation 01/15/2019   • PFO (patent foramen ovale) 06/2014   • STEMI involving left anterior descending coronary artery 07/2021    also previous PCI in 2014   • Stroke          ALLERGY  Allergies   Allergen Reactions   • Eliquis [Apixaban] Paresthesia          SURGICALHX  Past Surgical History:   Procedure Laterality Date   • CARDIAC SURGERY      HEART BYPASS    • CAROTID STENT            SOC  Social History     Socioeconomic History   • Marital status:      Spouse name:  "Not on file   • Number of children: Not on file   • Years of education: Not on file   • Highest education level: Not on file   Tobacco Use   • Smoking status: Former Smoker     Packs/day: 1.50     Years: 35.00     Pack years: 52.50   • Smokeless tobacco: Former User   • Tobacco comment: CURRENT EVERYDAY SMOKER, SMOKED 31 OR MORE YEARS     Vaping Use   • Vaping Use: Never used   Substance and Sexual Activity   • Alcohol use: Yes     Comment: DRINKS monthly, LESS THAN 1 DRINK PER DAY, HAS BEEN DRINKING FOR 31 OR MORE YEARS   • Drug use: Never   • Sexual activity: Defer         FAMHX  Family History   Problem Relation Age of Onset   • Cancer Mother    • Heart disease Father    • Diabetes Father           MEDSIGONLY  Current Outpatient Medications on File Prior to Visit   Medication Sig   • amLODIPine (NORVASC) 10 MG tablet Take 1 tablet by mouth Daily for 90 days.   • aspirin 81 MG chewable tablet Chew 1 tablet Daily.   • atorvastatin (LIPITOR) 80 MG tablet Take 1 tablet by mouth Daily.   • clopidogrel (PLAVIX) 75 MG tablet Take 1 tablet by mouth Daily.   • ezetimibe (ZETIA) 10 MG tablet Take 1 tablet by mouth Daily for 90 days.   • furosemide (LASIX) 40 MG tablet Take 1 tablet by mouth 2 (Two) Times a Day for 90 days. (Patient taking differently: Take 40 mg by mouth Daily.)   • lisinopril (PRINIVIL,ZESTRIL) 10 MG tablet Take 1 tablet by mouth Daily.   • rivaroxaban (XARELTO) 20 MG tablet Take 1 tablet by mouth Daily for 90 days.   • carvedilol (COREG) 25 MG tablet Take 1 tablet by mouth 2 (Two) Times a Day With Meals.     No current facility-administered medications on file prior to visit.         Objective   /82   Pulse 78   Ht 175.3 cm (69\")   Wt 135 kg (298 lb)   BMI 44.01 kg/m²       Physical Exam  Constitutional:       Appearance: He is obese.   HENT:      Head: Normocephalic.   Neck:      Vascular: No carotid bruit.   Cardiovascular:      Rate and Rhythm: Normal rate and regular rhythm.      Pulses: " Normal pulses.      Heart sounds: Normal heart sounds. No murmur heard.     Pulmonary:      Effort: Pulmonary effort is normal.      Breath sounds: Normal breath sounds.   Musculoskeletal:      Cervical back: Neck supple.      Right lower leg: No edema.      Left lower leg: No edema.   Skin:     General: Skin is dry.      Capillary Refill: Capillary refill takes less than 2 seconds.   Neurological:      Mental Status: He is alert and oriented to person, place, and time.   Psychiatric:         Mood and Affect: Mood normal.         Behavior: Behavior normal.       Result Review :   The following data was reviewed by: ROYCE Frankel on 08/09/2021:  proBNP   Date Value Ref Range Status   07/28/2021 532.6 (H) 0.0 - 450.0 pg/mL Final     CMP    CMP 7/30/21   Glucose 117 (A)   BUN 14   Creatinine 1.20   eGFR Non African Am 64   Sodium 136   Potassium 3.5   Chloride 103   Calcium 8.5 (A)   Albumin    Total Bilirubin    Alkaline Phosphatase    AST (SGOT)    ALT (SGPT)    (A) Abnormal value            CBC w/diff    CBC w/Diff 7/30/21   WBC 11.08 (A)   RBC 5.15   Hemoglobin 15.1   Hematocrit 44.3   MCV 86.0   MCH 29.3   MCHC 34.1   RDW 13.6   Platelets 251   Neutrophil Rel %    Immature Granulocyte Rel %    Lymphocyte Rel %    Monocyte Rel %    Eosinophil Rel %    Basophil Rel %    (A) Abnormal value             Lab Results   Component Value Date    TSH 1.400 06/07/2021      No results found for: FREET4   No results found for: DDIMERQUANT  Magnesium   Date Value Ref Range Status   07/28/2021 1.8 1.6 - 2.6 mg/dL Final      No results found for: DIGOXIN   Lab Results   Component Value Date    TROPONINT <0.01 01/03/2019           Lipid Panel    Lipid Panel 7/30/21   Total Cholesterol 166   Triglycerides 231 (A)   HDL Cholesterol 29 (A)   VLDL Cholesterol 40   LDL Cholesterol  97   LDL/HDL Ratio 3.13   (A) Abnormal value            Assessment and Plan    Diagnoses and all orders for this visit:    1. History of (STEMI) due  to occlusion of mid portion of left anterior descending (LAD) coronary artery (Primary)  Today he denies any anginal symptoms, continue aspirin 81 mg daily and Plavix 75 mg daily.    2. S/P coronary artery stent placement  Currently no symptoms to indicate any issues, continue carvedilol 25 mg twice daily.    3. Paroxysmal atrial fibrillation  Currently in sinus rhythm, continue Xarelto for stroke prevention.    4. Essential hypertension  Currently controlled and he denies any adverse effects of medications, continue current doses of amlodipine, lisinopril, and furosemide.  He had labs earlier today that have not resulted yet that were ordered by PCP which included: CMP, CBC, BNP.  Will call with results once available to me.    5. Mixed hyperlipidemia  Last lipid panel was 7/30/2021 with LDL level of 97.  The goal of his LDL will be less than 70.  Continue current doses of atorvastatin and Zetia.  Repeat lipid level with next visit.    Refills:  -     amLODIPine (NORVASC) 10 MG tablet; Take 1 tablet by mouth Daily for 90 days.  Dispense: 90 tablet; Refill: 3  -     atorvastatin (LIPITOR) 80 MG tablet; Take 1 tablet by mouth Daily.  Dispense: 90 tablet; Refill: 3  -     carvedilol (COREG) 25 MG tablet; Take 1 tablet by mouth 2 (Two) Times a Day With Meals.  Dispense: 180 tablet; Refill: 3  -     clopidogrel (PLAVIX) 75 MG tablet; Take 1 tablet by mouth Daily.  Dispense: 90 tablet; Refill: 3  -     ezetimibe (ZETIA) 10 MG tablet; Take 1 tablet by mouth Daily for 90 days.  Dispense: 90 tablet; Refill: 3  -     lisinopril (PRINIVIL,ZESTRIL) 10 MG tablet; Take 1 tablet by mouth Daily.  Dispense: 90 tablet; Refill: 3  -     rivaroxaban (XARELTO) 20 MG tablet; Take 1 tablet by mouth Daily for 90 days.  Dispense: 90 tablet; Refill: 3  -     furosemide (LASIX) 40 MG tablet; Take 1 tablet by mouth 2 (Two) Times a Day.  Dispense: 180 tablet; Refill: 3            Follow Up   Return for He has follow up with Dr Alonso scheduled  for January, keep this appointment.    Patient was given instructions and counseling regarding his condition or for health maintenance advice. Please see specific information pulled into the AVS if appropriate.     Yefri PRICE Everardo  reports that he has quit smoking. He has a 52.50 pack-year smoking history. He has quit using smokeless tobacco.         Adrienne Clemons, ROYCE  08/09/21  11:51 EDT    Dictated Utilizing Dragon Dictation

## 2021-08-11 ENCOUNTER — TELEPHONE (OUTPATIENT)
Dept: INTERNAL MEDICINE | Facility: CLINIC | Age: 51
End: 2021-08-11

## 2021-08-11 NOTE — TELEPHONE ENCOUNTER
Caller: [facility]    Relationship: Emergency Contact    Best call back number: [phone]     What is the best time to reach you: ANY    Who are you requesting to speak with (clinical staff, provider,  specific staff member): CLINICAL    Do you know the name of the person who called:     What was the call regarding: LAB WORK    Do you require a callback: YES, PLEASE CALL AND ADVISE

## 2021-08-12 ENCOUNTER — TELEPHONE (OUTPATIENT)
Dept: INTERNAL MEDICINE | Facility: CLINIC | Age: 51
End: 2021-08-12

## 2021-08-12 NOTE — TELEPHONE ENCOUNTER
PT VERIFIED      CONTACTED PT PER PROVIDER'S INSTRUCTIONS     Anusha Courtney, APRN   8/10/2021 12:37 PM EDT       Creatinine slightly elevated increase water intake glucose 164 at time of labs keep follow-up appointment as scheduled with PCP and cardiologist BMP elevated compared to 2 weeks prior monitor swelling and shortness of breath call for new symptoms White blood cell count remains slightly elevated recheck in 30 days report any fevers or cough     CONFIRMED PT PHARMACY ON FILE FOR PRESCRIPTIONS

## 2021-08-13 DIAGNOSIS — D72.829 LEUKOCYTOSIS, UNSPECIFIED TYPE: Primary | ICD-10-CM

## 2021-09-08 ENCOUNTER — TELEPHONE (OUTPATIENT)
Dept: INTERNAL MEDICINE | Facility: CLINIC | Age: 51
End: 2021-09-08

## 2021-09-08 DIAGNOSIS — I50.22 CHRONIC SYSTOLIC CHF (CONGESTIVE HEART FAILURE) (HCC): ICD-10-CM

## 2021-09-08 DIAGNOSIS — I10 ESSENTIAL HYPERTENSION: Primary | ICD-10-CM

## 2021-09-08 DIAGNOSIS — R73.01 IMPAIRED FASTING GLUCOSE: ICD-10-CM

## 2021-09-08 DIAGNOSIS — E78.2 MIXED HYPERLIPIDEMIA: ICD-10-CM

## 2021-09-08 NOTE — TELEPHONE ENCOUNTER
Caller: [facility]    Relationship: Emergency Contact    Best call back number:[phone]    What orders are you requesting (i.e. lab or imaging): LABS    In what timeframe would the patient need to come in: AS SOON AS POSSIBLE    Where will you receive your lab/imaging services:     Additional notes: THIS IS FOR A FOLLOW UP ON BLOOD WORK      PLEASE ADVISED WHEN ORDER IS READY

## 2021-09-17 NOTE — TELEPHONE ENCOUNTER
PT VERIFIED      CONTACTED PT PER PROVIDER'S INSTRUCTIONS     PT CAN GO TO OUTPATIENT LAB TO COMPLETE    [facility]  PHONE [phone]    OPT 1 DIAGNOSTIC LABS/[facility] (BLOOD/URINE ORDERS)    [facility] LAB MENU  [address] [place] [id number]    HOURS 6:30 a.m. - 5:00 p.m. MON-FRI    OPT 1 LAB SCHEDULING

## 2021-10-08 ENCOUNTER — LAB (OUTPATIENT)
Dept: LAB | Facility: HOSPITAL | Age: 51
End: 2021-10-08

## 2021-10-08 DIAGNOSIS — I50.22 CHRONIC SYSTOLIC CHF (CONGESTIVE HEART FAILURE) (HCC): ICD-10-CM

## 2021-10-08 DIAGNOSIS — I10 ESSENTIAL HYPERTENSION: ICD-10-CM

## 2021-10-08 DIAGNOSIS — R73.01 IMPAIRED FASTING GLUCOSE: ICD-10-CM

## 2021-10-08 DIAGNOSIS — E78.2 MIXED HYPERLIPIDEMIA: ICD-10-CM

## 2021-10-08 LAB
ALBUMIN SERPL-MCNC: 4.3 G/DL (ref 3.5–5.2)
ALBUMIN/GLOB SERPL: 1.4 G/DL
ALP SERPL-CCNC: 67 U/L (ref 39–117)
ALT SERPL W P-5'-P-CCNC: 25 U/L (ref 1–41)
ANION GAP SERPL CALCULATED.3IONS-SCNC: 10.5 MMOL/L (ref 5–15)
AST SERPL-CCNC: 22 U/L (ref 1–40)
BASOPHILS # BLD AUTO: 0.1 10*3/MM3 (ref 0–0.2)
BASOPHILS NFR BLD AUTO: 1 % (ref 0–1.5)
BILIRUB SERPL-MCNC: 0.4 MG/DL (ref 0–1.2)
BUN SERPL-MCNC: 11 MG/DL (ref 6–20)
BUN/CREAT SERPL: 9.7 (ref 7–25)
CALCIUM SPEC-SCNC: 9.5 MG/DL (ref 8.6–10.5)
CHLORIDE SERPL-SCNC: 102 MMOL/L (ref 98–107)
CHOLEST SERPL-MCNC: 152 MG/DL (ref 0–200)
CO2 SERPL-SCNC: 25.5 MMOL/L (ref 22–29)
CREAT SERPL-MCNC: 1.13 MG/DL (ref 0.76–1.27)
DEPRECATED RDW RBC AUTO: 42.7 FL (ref 37–54)
EOSINOPHIL # BLD AUTO: 0.25 10*3/MM3 (ref 0–0.4)
EOSINOPHIL NFR BLD AUTO: 2.6 % (ref 0.3–6.2)
ERYTHROCYTE [DISTWIDTH] IN BLOOD BY AUTOMATED COUNT: 13.7 % (ref 12.3–15.4)
GFR SERPL CREATININE-BSD FRML MDRD: 69 ML/MIN/1.73
GLOBULIN UR ELPH-MCNC: 3 GM/DL
GLUCOSE SERPL-MCNC: 116 MG/DL (ref 65–99)
HBA1C MFR BLD: 6.38 % (ref 4.8–5.6)
HCT VFR BLD AUTO: 43.3 % (ref 37.5–51)
HDLC SERPL-MCNC: 32 MG/DL (ref 40–60)
HGB BLD-MCNC: 14.6 G/DL (ref 13–17.7)
IMM GRANULOCYTES # BLD AUTO: 0.02 10*3/MM3 (ref 0–0.05)
IMM GRANULOCYTES NFR BLD AUTO: 0.2 % (ref 0–0.5)
LDLC SERPL CALC-MCNC: 78 MG/DL (ref 0–100)
LDLC/HDLC SERPL: 2.18 {RATIO}
LYMPHOCYTES # BLD AUTO: 2.73 10*3/MM3 (ref 0.7–3.1)
LYMPHOCYTES NFR BLD AUTO: 28.2 % (ref 19.6–45.3)
MCH RBC QN AUTO: 29 PG (ref 26.6–33)
MCHC RBC AUTO-ENTMCNC: 33.7 G/DL (ref 31.5–35.7)
MCV RBC AUTO: 86.1 FL (ref 79–97)
MONOCYTES # BLD AUTO: 0.89 10*3/MM3 (ref 0.1–0.9)
MONOCYTES NFR BLD AUTO: 9.2 % (ref 5–12)
NEUTROPHILS NFR BLD AUTO: 5.7 10*3/MM3 (ref 1.7–7)
NEUTROPHILS NFR BLD AUTO: 58.8 % (ref 42.7–76)
NRBC BLD AUTO-RTO: 0 /100 WBC (ref 0–0.2)
NT-PROBNP SERPL-MCNC: 887.9 PG/ML (ref 0–450)
PLATELET # BLD AUTO: 302 10*3/MM3 (ref 140–450)
PMV BLD AUTO: 10.6 FL (ref 6–12)
POTASSIUM SERPL-SCNC: 4.2 MMOL/L (ref 3.5–5.2)
PROT SERPL-MCNC: 7.3 G/DL (ref 6–8.5)
RBC # BLD AUTO: 5.03 10*6/MM3 (ref 4.14–5.8)
SODIUM SERPL-SCNC: 138 MMOL/L (ref 136–145)
TRIGL SERPL-MCNC: 251 MG/DL (ref 0–150)
VLDLC SERPL-MCNC: 42 MG/DL (ref 5–40)
WBC # BLD AUTO: 9.69 10*3/MM3 (ref 3.4–10.8)

## 2021-10-08 PROCEDURE — 85025 COMPLETE CBC W/AUTO DIFF WBC: CPT

## 2021-10-08 PROCEDURE — 80053 COMPREHEN METABOLIC PANEL: CPT

## 2021-10-08 PROCEDURE — 36415 COLL VENOUS BLD VENIPUNCTURE: CPT

## 2021-10-08 PROCEDURE — 83880 ASSAY OF NATRIURETIC PEPTIDE: CPT

## 2021-10-08 PROCEDURE — 83036 HEMOGLOBIN GLYCOSYLATED A1C: CPT

## 2021-10-08 PROCEDURE — 80061 LIPID PANEL: CPT

## 2021-10-25 ENCOUNTER — TELEPHONE (OUTPATIENT)
Dept: INTERNAL MEDICINE | Facility: CLINIC | Age: 51
End: 2021-10-25

## 2021-10-25 NOTE — TELEPHONE ENCOUNTER
Caller: [facility]    Relationship: Emergency Contact    Best call back number: [phone]    Caller requesting test results: WIFE    What test was performed: LABS    When was the test performed: 10/8/21    Where was the test performed: [facility]    Additional notes: PATIENTS WIFE WOULD LIKE TO KNOW THE RESULTS OF HIS LABS. PLEASE CALL PATIENTS WIFE TO ADVISE.

## 2021-10-25 NOTE — TELEPHONE ENCOUNTER
ATTEMPTED TO CONTACT PT PER PROVIDER'S INSTRUCTIONS     NO ANSWER     LEFT VOICEMAIL WITH INSTRUCTIONS TO RETURN CALL TO OFFICE AT [phone]    OK FOR HUB TO ADVISE/READ   Elevated triglycerides, which are the storage form of sugar - recommend lower carbohydrate/sugar intake.      HDL low - this is protective cholesterol and generally like the number to be >50. encourage exercise to increase level.      Labs otherwise look good with good control of DM.   Written by Len Nichols Jr., MD on 10/12/2021  9:24 AM EDT

## 2021-11-23 ENCOUNTER — TELEPHONE (OUTPATIENT)
Dept: INTERNAL MEDICINE | Facility: CLINIC | Age: 51
End: 2021-11-23

## 2021-11-23 NOTE — TELEPHONE ENCOUNTER
Caller: [facility]    Relationship: Emergency Contact    Best call back number: [phone]    Requested Prescriptions:   Requested Prescriptions     Pending Prescriptions Disp Refills   • atorvastatin (LIPITOR) 80 MG tablet 90 tablet 3     Sig: Take 1 tablet by mouth Daily.   • carvedilol (COREG) 25 MG tablet 180 tablet 3     Sig: Take 1 tablet by mouth 2 (Two) Times a Day With Meals.   • aspirin 81 MG chewable tablet 90 tablet 3     Sig: Chew 1 tablet Daily.   • clopidogrel (PLAVIX) 75 MG tablet 90 tablet 3     Sig: Take 1 tablet by mouth Daily.   • lisinopril (PRINIVIL,ZESTRIL) 10 MG tablet 90 tablet 3     Sig: Take 1 tablet by mouth Daily.        Pharmacy where request should be sent:    [facility] [place] [facility] [place]  [place]  [address] - [phone] [facility] [phone]   [phone]    Additional details provided by patient: PATIENT IS CURRENTLY OUT OF MEDICATIONS.   Does the patient have less than a 3 day supply:  [x] Yes  [] No    Nav Ruffin Rep   11/23/21 16:51 EST

## 2021-11-24 RX ORDER — LISINOPRIL 10 MG/1
10 TABLET ORAL
Qty: 90 TABLET | Refills: 3 | Status: SHIPPED | OUTPATIENT
Start: 2021-11-24 | End: 2022-10-12

## 2021-11-24 RX ORDER — CLOPIDOGREL BISULFATE 75 MG/1
75 TABLET ORAL DAILY
Qty: 90 TABLET | Refills: 3 | Status: SHIPPED | OUTPATIENT
Start: 2021-11-24 | End: 2023-03-08 | Stop reason: SDUPTHER

## 2021-11-24 RX ORDER — ATORVASTATIN CALCIUM 80 MG/1
80 TABLET, FILM COATED ORAL DAILY
Qty: 90 TABLET | Refills: 3 | Status: SHIPPED | OUTPATIENT
Start: 2021-11-24 | End: 2022-01-05

## 2021-11-24 RX ORDER — ASPIRIN 81 MG/1
81 TABLET, CHEWABLE ORAL DAILY
Qty: 90 TABLET | Refills: 3 | Status: SHIPPED | OUTPATIENT
Start: 2021-11-24 | End: 2022-01-05

## 2021-11-24 RX ORDER — CARVEDILOL 25 MG/1
25 TABLET ORAL 2 TIMES DAILY WITH MEALS
Qty: 180 TABLET | Refills: 3 | Status: SHIPPED | OUTPATIENT
Start: 2021-11-24 | End: 2022-03-10 | Stop reason: SDUPTHER

## 2021-11-24 NOTE — TELEPHONE ENCOUNTER
ATTEMPTED TO CONTACT PT PER PROVIDER'S INSTRUCTIONS     NO ANSWER     LEFT VOICEMAIL WITH INSTRUCTIONS TO RETURN CALL TO OFFICE AT [phone]    OK FOR HUB TO ADVISE/READ    Len Nichols Jr., MD  [facility] 4 hours ago (9:22 AM)     Please notify pt that scripts have been sent to [facility]. Thank you

## 2021-12-07 ENCOUNTER — OFFICE VISIT (OUTPATIENT)
Dept: INTERNAL MEDICINE | Facility: CLINIC | Age: 51
End: 2021-12-07

## 2021-12-07 VITALS
OXYGEN SATURATION: 97 % | DIASTOLIC BLOOD PRESSURE: 81 MMHG | WEIGHT: 303.2 LBS | HEART RATE: 74 BPM | BODY MASS INDEX: 44.91 KG/M2 | HEIGHT: 69 IN | SYSTOLIC BLOOD PRESSURE: 118 MMHG | TEMPERATURE: 96.7 F

## 2021-12-07 DIAGNOSIS — Z23 NEEDS FLU SHOT: ICD-10-CM

## 2021-12-07 DIAGNOSIS — Z23 ENCOUNTER FOR IMMUNIZATION: ICD-10-CM

## 2021-12-07 DIAGNOSIS — I10 PRIMARY HYPERTENSION: Primary | ICD-10-CM

## 2021-12-07 DIAGNOSIS — Z12.11 COLON CANCER SCREENING: ICD-10-CM

## 2021-12-07 DIAGNOSIS — I48.0 PAROXYSMAL ATRIAL FIBRILLATION (HCC): ICD-10-CM

## 2021-12-07 DIAGNOSIS — R73.01 IMPAIRED FASTING GLUCOSE: Chronic | ICD-10-CM

## 2021-12-07 DIAGNOSIS — E78.49 OTHER HYPERLIPIDEMIA: ICD-10-CM

## 2021-12-07 DIAGNOSIS — N18.2 CHRONIC RENAL IMPAIRMENT, STAGE 2 (MILD): ICD-10-CM

## 2021-12-07 PROCEDURE — 99396 PREV VISIT EST AGE 40-64: CPT | Performed by: INTERNAL MEDICINE

## 2021-12-07 PROCEDURE — 90732 PPSV23 VACC 2 YRS+ SUBQ/IM: CPT | Performed by: INTERNAL MEDICINE

## 2021-12-07 PROCEDURE — 90472 IMMUNIZATION ADMIN EACH ADD: CPT | Performed by: INTERNAL MEDICINE

## 2021-12-07 PROCEDURE — 90715 TDAP VACCINE 7 YRS/> IM: CPT | Performed by: INTERNAL MEDICINE

## 2021-12-07 PROCEDURE — 90471 IMMUNIZATION ADMIN: CPT | Performed by: INTERNAL MEDICINE

## 2021-12-07 PROCEDURE — 90686 IIV4 VACC NO PRSV 0.5 ML IM: CPT | Performed by: INTERNAL MEDICINE

## 2021-12-07 RX ORDER — AMLODIPINE BESYLATE 10 MG/1
10 TABLET ORAL DAILY
COMMUNITY
End: 2023-03-08 | Stop reason: SDUPTHER

## 2021-12-07 NOTE — PROGRESS NOTES
"Chief Complaint  Annual exam, Chronic atrial fibillation, and Immunizations (flu,  Tdap )    Subjective          Yefri Mcgee presents to [facility]  History of Present Illness  afib- patient reports having upcoming dental work and inquiring about anticoagulants. Doing well on xarelto.   hypertension- patient denies Has, dizziness, chest pain. Patient reports home readings typically 110s/70s.   hyperlipidemia- doing well on statin and zetia.   impaired glucose tolerance- due for HgbA1c recheck  coronary artery disease status post percutaneous coronary intervention- patient is on plavix, ASA, xarelto, and coreg.   Due for flu and tdap and ppsv23    Current Outpatient Medications   Medication Instructions   • amLODIPine (NORVASC) 10 mg, Oral, Daily   • aspirin 81 mg, Oral, Daily   • atorvastatin (LIPITOR) 80 mg, Oral, Daily   • carvedilol (COREG) 25 mg, Oral, 2 Times Daily With Meals   • clopidogrel (PLAVIX) 75 mg, Oral, Daily   • ezetimibe (ZETIA) 10 mg, Oral, Daily   • furosemide (LASIX) 40 mg, Oral, 2 Times Daily   • lisinopril (PRINIVIL,ZESTRIL) 10 mg, Oral, Every 24 Hours Scheduled   • rivaroxaban (XARELTO) 20 mg, Oral, Daily       Objective   Vital Signs:   /81 (BP Location: Left arm, Patient Position: Sitting, Cuff Size: Large Adult)   Pulse 74   Temp 96.7 °F (35.9 °C) (Temporal)   Ht 175.3 cm (69\")   Wt (!) 138 kg (303 lb 3.2 oz)   SpO2 97%   BMI 44.77 kg/m²     Wt Readings from Last 3 Encounters:   12/07/21 (!) 138 kg (303 lb 3.2 oz)   08/09/21 135 kg (298 lb)   08/09/21 135 kg (297 lb 6.4 oz)     BP Readings from Last 3 Encounters:   12/07/21 118/81   08/09/21 120/82   08/09/21 123/83     Physical Exam   Appearance: No acute distress, well-nourished  Head: normocephalic, atraumatic  Eyes: extraocular movements intact, no scleral icterus, no conjunctival injection  Ears, Nose, and Throat: external ears normal, nares patent, moist mucous " membranes  Cardiovascular: regular rate and rhythm. no murmurs, rales, or rhonchi. no edema  Respiratory: breathing comfortably, symmetric chest rise, clear to auscultation bilaterally. No wheezes, rales, or rhonchi.  Neuro: alert and oriented to time, place, and person. Normal gait  Psych: normal mood and affect     Result Review :   The following data was reviewed by: Len Nichols Jr, MD on 12/07/2021:  Common labs    Common Labsle 7/30/21 7/30/21 7/30/21 8/9/21 8/9/21 10/8/21 10/8/21 10/8/21 10/8/21    0606 [id number] 0606 0908 0908 1331 1331 1331 1331   Glucose   117 (A)  164 (A)   116 (A)    BUN   14  15   11    Creatinine   1.20  1.40 (A)   1.13    eGFR Non  Am   64  54 (A)   69    Sodium   136  140   138    Potassium   3.5  4.2   4.2    Chloride   103  103   102    Calcium   8.5 (A)  9.2   9.5    Albumin     4.00   4.30    Total Bilirubin     0.5   0.4    Alkaline Phosphatase     57   67    AST (SGOT)     18   22    ALT (SGPT)     15   25    WBC 11.08 (A)   13.21 (A)  9.69      Hemoglobin 15.1   14.3  14.6      Hematocrit 44.3   42.4  43.3      Platelets 251   377  302      Total Cholesterol  166     152     Triglycerides  231 (A)     251 (A)     HDL Cholesterol  29 (A)     32 (A)     LDL Cholesterol   97     78     Hemoglobin A1C         6.38 (A)   (A) Abnormal value                 Assessment and Plan    Diagnoses and all orders for this visit:    1. Primary hypertension (Primary)  Comments:  well controlled on regimen    2. Needs flu shot  -     FluLaval/Fluarix/Fluzone >6 Months (5297-7013)    3. Encounter for immunization  -     FluLaval/Fluarix/Fluzone >6 Months (8980-7866)  -     Tdap Vaccine Greater Than or Equal To 6yo IM  -     Pneumococcal Polysaccharide Vaccine 23-Valent (PPSV23) Greater Than or Equal To 1yo Subcutaneous / IM    4. Other hyperlipidemia  Comments:  cont statin and zetia given concurrent CAD s/p PCI.     5. Impaired fasting glucose  Comments:  HgbA1c reviewed from  10/2021. encourage low carb, high protein diet. monitor.     6. Chronic renal impairment, stage 2 (mild)  Comments:  recent labs reviewed with stable kidney function    7. Colon cancer screening  -     Ambulatory Referral For Screening Colonoscopy    8. Paroxysmal atrial fibrillation  Comments:  cont xarelto. f/u with cardiology. rate controlled today.       Advised on diet, physical activity, etc    There are no discontinued medications.     Follow Up   Return in about 4 months (around 4/7/2022) for Recheck.  Patient was given instructions and counseling regarding his condition or for health maintenance advice. Please see specific information pulled into the AVS if appropriate.

## 2021-12-21 ENCOUNTER — OFFICE VISIT (OUTPATIENT)
Dept: CARDIAC REHAB | Facility: HOSPITAL | Age: 51
End: 2021-12-21

## 2021-12-21 VITALS
HEART RATE: 88 BPM | DIASTOLIC BLOOD PRESSURE: 90 MMHG | WEIGHT: 302.47 LBS | BODY MASS INDEX: 44.8 KG/M2 | HEIGHT: 69 IN | OXYGEN SATURATION: 95 % | SYSTOLIC BLOOD PRESSURE: 140 MMHG

## 2021-12-21 DIAGNOSIS — I21.02 ACUTE ST ELEVATION MYOCARDIAL INFARCTION (STEMI) DUE TO OCCLUSION OF MID PORTION OF LEFT ANTERIOR DESCENDING (LAD) CORONARY ARTERY (HCC): ICD-10-CM

## 2021-12-21 DIAGNOSIS — Z95.5 S/P CORONARY ARTERY STENT PLACEMENT: ICD-10-CM

## 2021-12-21 PROCEDURE — 93798 PHYS/QHP OP CAR RHAB W/ECG: CPT

## 2021-12-21 NOTE — PROGRESS NOTES
Chief Complaint  Cardiac Rehab Phase II    Yefri Mcgee presents to [facility]    Physical Exam  Constitutional:       Appearance: Normal appearance. He is morbidly obese.   HENT:      Head: Normocephalic.      Jaw: There is normal jaw occlusion.   Eyes:      General: Lids are normal.      Conjunctiva/sclera: Conjunctivae normal.   Cardiovascular:      Rate and Rhythm: Normal rate and regular rhythm.      Pulses: Normal pulses.      Heart sounds: Normal heart sounds.   Pulmonary:      Effort: Pulmonary effort is normal.      Breath sounds: Normal breath sounds.   Abdominal:      General: Bowel sounds are normal.      Palpations: Abdomen is soft.   Musculoskeletal:      Cervical back: Normal range of motion.      Right lower leg: No edema.      Left lower leg: No edema.   Neurological:      Mental Status: He is alert.      Sensory: Sensation is intact.   Psychiatric:         Attention and Perception: Attention normal.         Mood and Affect: Mood normal.         Behavior: Behavior is cooperative.        Result Review :          Assessment and Plan    Diagnoses and all orders for this visit:    1. S/P coronary artery stent placement  -     Cardiac Rehab Phase II    2. Acute ST elevation myocardial infarction (STEMI) due to occlusion of mid portion of left anterior descending (LAD) coronary artery (HCC)  -     Cardiac Rehab Phase II        Beth Gayle RN

## 2021-12-21 NOTE — PROGRESS NOTES
Phase II and III Education    Discipline Teaching:  Cardiac Rehab Phase II [x]      Cardiac Rehab Phase III []      Pulmonary Rehab II []      Pulmonary Rehab III []      Peripheral Artery Disease []        Class Given:  Asthma Management []      Beginners Cardiac Rehab [x]      Beginners Pulmonary Rehab []      CAD I []      CAD II []      CHF []      Diabetes Mellitus []     Diet & Cholesterol []     Diet Consult []      Energy Conservation []     Exercise []     Grocery Store Tour []     Harmonica Therapy []     High Blood Pressure [x]     Living with COPD []     Medication Safety []     Peripheral Artery Disease []     S & S of Infection []      Stress []        Education Topics:  Angina []      Arrhythmias []      Asthma Management []      Beginning Cardiac Rehab [x]      Blood Pressure [x]     Blood Sugar []     Breathing Retrainment []     CHF [x]     Cooking []     Daily Weight []     Diabetes Mellitus []      Diet [x]     Energy Conservation []     Exercise [x]      Foot Care []      Grocery Store Tour []      Heart Disease [x]      Heart Function []      Incision Care []     Living with COPD []     Medication Safety []     PAD Symptoms/Treatment []     Reconditioning Exercises []     S & S Infection []     Smoking Consult []     Stress Management []     Test Results []       Teaching Aids:  Video [x]      PAD Handout []      Pulmonary Workbook []      CAD Teaching Packet []      Stress Teaching Packet []     Exercise Teaching Packet []      Diet Teaching Packet []      CHF Teaching Packet []      Blood Pressure Teaching Packet []      Smoking Cessation Packet [x]      Medication Safety Handout []      Pflex Training []      Diabetes Teaching Packet []      Harmonica Therapy Packet []        Teaching Method:  Discussion [x]      Written Information [x]      Audio Visual [x]      Demonstration []        Teaching Recipient:  Patient [x]      Family []      Friend []      Legal Guardian []      Primary Care  Giver []      Significant Other []        Barriers To Learning:  None [x]      Auditory []      Cultural []      Emotional []      Financial []      Language []    Mental []      Motivation []      Physical []      Reading Skills []      Denominational []      Verbal/Cognitive []      Visual []      Written/Cognitive []        Teaching Response:  Verified Via Teach back [x]      Return Demo Via Teach Back []      Reinforcement Needed []      Unable to Return Demo []      Unable to Comprehend []      Declines Teaching  []        Additional Education Topics:    Follow Up Instruction Comment

## 2021-12-22 ENCOUNTER — APPOINTMENT (OUTPATIENT)
Dept: CARDIAC REHAB | Facility: HOSPITAL | Age: 51
End: 2021-12-22

## 2021-12-27 ENCOUNTER — TREATMENT (OUTPATIENT)
Dept: CARDIAC REHAB | Facility: HOSPITAL | Age: 51
End: 2021-12-27

## 2021-12-27 ENCOUNTER — TELEPHONE (OUTPATIENT)
Dept: INTERNAL MEDICINE | Facility: CLINIC | Age: 51
End: 2021-12-27

## 2021-12-27 DIAGNOSIS — Z95.5 S/P CORONARY ARTERY STENT PLACEMENT: Primary | ICD-10-CM

## 2021-12-27 DIAGNOSIS — Z95.5 S/P CORONARY ARTERY STENT PLACEMENT: ICD-10-CM

## 2021-12-27 DIAGNOSIS — I50.22 CHRONIC SYSTOLIC CHF (CONGESTIVE HEART FAILURE): ICD-10-CM

## 2021-12-27 DIAGNOSIS — I48.0 PAROXYSMAL ATRIAL FIBRILLATION: Primary | ICD-10-CM

## 2021-12-27 PROCEDURE — 93798 PHYS/QHP OP CAR RHAB W/ECG: CPT

## 2021-12-27 NOTE — TELEPHONE ENCOUNTER
Caller: [facility]    Relationship: Emergency Contact    Best call back number: [phone]    What is the medical concern/diagnosis: ALREADY REFERRED THERE    What specialty or service is being requested: CARDIOLOGIST    What is the provider, practice or medical service name: DR. NATE METZ       Any additional details: PATIENT HAS UPCOMING APPOINTMENT ON 01/05/22 BUT HIS REFERRAL HAS RUN OUT. PLEASE ADVISE ONCE NEW REFERRAL HAS BEEN PLACED

## 2021-12-28 NOTE — TELEPHONE ENCOUNTER
ATTEMPTED TO CONTACT PT PER PROVIDER'S INSTRUCTIONS     NO ANSWER     LEFT VOICEMAIL WITH INSTRUCTIONS TO RETURN CALL TO OFFICE AT [phone]    OK FOR HUB TO ADVISE/READ   Len Nichols Jr., MD 15 hours ago (6:15 PM)        Referral placed

## 2021-12-29 ENCOUNTER — TREATMENT (OUTPATIENT)
Dept: CARDIAC REHAB | Facility: HOSPITAL | Age: 51
End: 2021-12-29

## 2021-12-29 DIAGNOSIS — Z95.5 S/P CORONARY ARTERY STENT PLACEMENT: Primary | ICD-10-CM

## 2021-12-29 PROCEDURE — 93798 PHYS/QHP OP CAR RHAB W/ECG: CPT

## 2022-01-03 ENCOUNTER — TREATMENT (OUTPATIENT)
Dept: CARDIAC REHAB | Facility: HOSPITAL | Age: 52
End: 2022-01-03

## 2022-01-03 DIAGNOSIS — Z95.5 S/P CORONARY ARTERY STENT PLACEMENT: Primary | ICD-10-CM

## 2022-01-03 PROCEDURE — 93798 PHYS/QHP OP CAR RHAB W/ECG: CPT

## 2022-01-05 ENCOUNTER — TREATMENT (OUTPATIENT)
Dept: CARDIAC REHAB | Facility: HOSPITAL | Age: 52
End: 2022-01-05

## 2022-01-05 ENCOUNTER — OFFICE VISIT (OUTPATIENT)
Dept: CARDIOLOGY | Facility: CLINIC | Age: 52
End: 2022-01-05

## 2022-01-05 VITALS
DIASTOLIC BLOOD PRESSURE: 87 MMHG | HEART RATE: 70 BPM | BODY MASS INDEX: 45.03 KG/M2 | SYSTOLIC BLOOD PRESSURE: 140 MMHG | WEIGHT: 304 LBS | HEIGHT: 69 IN

## 2022-01-05 DIAGNOSIS — E78.5 HYPERLIPIDEMIA LDL GOAL <70: ICD-10-CM

## 2022-01-05 DIAGNOSIS — Z95.5 S/P CORONARY ARTERY STENT PLACEMENT: ICD-10-CM

## 2022-01-05 DIAGNOSIS — I48.0 PAROXYSMAL ATRIAL FIBRILLATION: ICD-10-CM

## 2022-01-05 DIAGNOSIS — I10 PRIMARY HYPERTENSION: ICD-10-CM

## 2022-01-05 DIAGNOSIS — I50.22 CHRONIC SYSTOLIC CHF (CONGESTIVE HEART FAILURE): Primary | ICD-10-CM

## 2022-01-05 DIAGNOSIS — Z95.5 S/P CORONARY ARTERY STENT PLACEMENT: Primary | ICD-10-CM

## 2022-01-05 DIAGNOSIS — I21.02 ACUTE ST ELEVATION MYOCARDIAL INFARCTION (STEMI) DUE TO OCCLUSION OF MID PORTION OF LEFT ANTERIOR DESCENDING (LAD) CORONARY ARTERY: ICD-10-CM

## 2022-01-05 PROCEDURE — 99214 OFFICE O/P EST MOD 30 MIN: CPT | Performed by: INTERNAL MEDICINE

## 2022-01-05 PROCEDURE — 93798 PHYS/QHP OP CAR RHAB W/ECG: CPT

## 2022-01-05 RX ORDER — ROSUVASTATIN CALCIUM 20 MG/1
20 TABLET, COATED ORAL DAILY
Qty: 90 TABLET | Refills: 3 | Status: SHIPPED | OUTPATIENT
Start: 2022-01-05 | End: 2022-11-16

## 2022-01-05 NOTE — ASSESSMENT & PLAN NOTE
No ongoing angina patient can discontinue his aspirin and just continue on chronic Plavix 75 mg once a day for prevention

## 2022-01-05 NOTE — ASSESSMENT & PLAN NOTE
Patient is status post MI is doing well clinically no evidence of volume overload we will plan on repeating echocardiogram on follow-up visit to see if improvement in his ejection fraction

## 2022-01-05 NOTE — ASSESSMENT & PLAN NOTE
LDL is above goal mildly we will change his Lipitor to Crestor 20 nightly repeat lipids and LFTs next visit

## 2022-01-05 NOTE — PROGRESS NOTES
Chief Complaint  Coronary Artery Disease    Subjective    Patient has been doing well no problems with chest discomfort shortness of breath he has had some mild weight gain of about 2 pounds over the holidays    Past Medical History:   Diagnosis Date   • Broken bones    • Chronic kidney disease    • Chronic systolic CHF 8/9/2021 07/28/2021 Heart Cath: Moderately reduced left ventricular systolic function with an estimated ejection fraction of 35%    • Head injury    • Hyperlipidemia    • Hypertension    • Paroxysmal atrial fibrillation 01/15/2019   • PFO (patent foramen ovale) 06/2014   • STEMI involving left anterior descending coronary artery 07/2021    also previous PCI in 2014   • Stroke          Current Outpatient Medications:   •  amLODIPine (NORVASC) 10 MG tablet, Take 10 mg by mouth Daily., Disp: , Rfl:   •  carvedilol (COREG) 25 MG tablet, Take 1 tablet by mouth 2 (Two) Times a Day With Meals., Disp: 180 tablet, Rfl: 3  •  clopidogrel (PLAVIX) 75 MG tablet, Take 1 tablet by mouth Daily., Disp: 90 tablet, Rfl: 3  •  ezetimibe (ZETIA) 10 MG tablet, Take 1 tablet by mouth Daily for 90 days., Disp: 90 tablet, Rfl: 3  •  furosemide (LASIX) 40 MG tablet, Take 1 tablet by mouth 2 (Two) Times a Day., Disp: 180 tablet, Rfl: 3  •  lisinopril (PRINIVIL,ZESTRIL) 10 MG tablet, Take 1 tablet by mouth Daily., Disp: 90 tablet, Rfl: 3  •  rivaroxaban (XARELTO) 20 MG tablet, Take 1 tablet by mouth Daily for 90 days., Disp: 90 tablet, Rfl: 3  •  rosuvastatin (CRESTOR) 20 MG tablet, Take 1 tablet by mouth Daily., Disp: 90 tablet, Rfl: 3    Medications Discontinued During This Encounter   Medication Reason   • atorvastatin (LIPITOR) 80 MG tablet    • aspirin 81 MG chewable tablet      Allergies   Allergen Reactions   • Eliquis [Apixaban] Paresthesia        Social History     Tobacco Use   • Smoking status: Current Every Day Smoker     Packs/day: 1.00     Years: 35.00     Pack years: 35.00     Types: Cigarettes     Start  "date: 1985   • Smokeless tobacco: Never Used   • Tobacco comment: CURRENT EVERYDAY SMOKER, SMOKED 31 OR MORE YEARS     Vaping Use   • Vaping Use: Former   • Substances: Nicotine, Flavoring   • Devices: [employer] Instrumentsble tank   Substance Use Topics   • Alcohol use: Yes     Comment: DRINKS monthly, LESS THAN 1 DRINK PER DAY, HAS BEEN DRINKING FOR 31 OR MORE YEARS   • Drug use: Never       Family History   Problem Relation Age of Onset   • Cancer Mother    • Heart disease Father    • Diabetes Father         Objective     /87   Pulse 70   Ht 175.3 cm (69\")   Wt (!) 138 kg (304 lb)   BMI 44.89 kg/m²       Physical Exam    General Appearance:   · no acute distress  · Alert and oriented x3  HENT:   · lips not cyanotic  · Atraumatic  Neck:  · No jvd   · supple  Respiratory:  · no respiratory distress  · normal breath sounds  · no rales  Cardiovascular:  · Regular rate and rhythm  · no S3, no S4   · no murmur  · no rub  Extremities  · No cyanosis  · lower extremity edema: none    Skin:   · warm, dry  · No rashes      Result Review :     proBNP   Date Value Ref Range Status   10/08/2021 887.9 (H) 0.0 - 450.0 pg/mL Final     CMP    CMP 7/30/21 8/9/21 10/8/21   Glucose 117 (A) 164 (A) 116 (A)   BUN 14 15 11   Creatinine 1.20 1.40 (A) 1.13   eGFR Non  Am 64 54 (A) 69   Sodium 136 140 138   Potassium 3.5 4.2 4.2   Chloride 103 103 102   Calcium 8.5 (A) 9.2 9.5   Albumin  4.00 4.30   Total Bilirubin  0.5 0.4   Alkaline Phosphatase  57 67   AST (SGOT)  18 22   ALT (SGPT)  15 25   (A) Abnormal value            CBC w/diff    CBC w/Diff 7/30/21 8/9/21 10/8/21   WBC 11.08 (A) 13.21 (A) 9.69   RBC 5.15 4.87 5.03   Hemoglobin 15.1 14.3 14.6   Hematocrit 44.3 42.4 43.3   MCV 86.0 87.1 86.1   MCH 29.3 29.4 29.0   MCHC 34.1 33.7 33.7   RDW 13.6 12.9 13.7   Platelets 251 377 302   Neutrophil Rel %  66.5 58.8   Immature Granulocyte Rel %  0.4 0.2   Lymphocyte Rel %  20.1 28.2   Monocyte Rel %  10.3 9.2   Eosinophil Rel %  1.9 2.6 "   Basophil Rel %  0.8 1.0   (A) Abnormal value             Lab Results   Component Value Date    TSH 1.400 06/07/2021      No results found for: FREET4   No results found for: DDIMERQUANT  Magnesium   Date Value Ref Range Status   07/28/2021 1.8 1.6 - 2.6 mg/dL Final      No results found for: DIGOXIN   Lab Results   Component Value Date    TROPONINT <0.01 01/03/2019           Lipid Panel    Lipid Panel 6/7/21 7/30/21 10/8/21   Total Cholesterol 134 166 152   Triglycerides 227 (A) 231 (A) 251 (A)   HDL Cholesterol 27 (A) 29 (A) 32 (A)   VLDL Cholesterol 38 40 42 (A)   LDL Cholesterol  69 97 78   LDL/HDL Ratio 2.28 3.13 2.18   (A) Abnormal value            Lab Results   Component Value Date    POCTROP 0.04 07/28/2021                      Diagnoses and all orders for this visit:    1. Chronic systolic CHF (Primary)  Assessment & Plan:  Patient is status post MI is doing well clinically no evidence of volume overload we will plan on repeating echocardiogram on follow-up visit to see if improvement in his ejection fraction      2. S/P coronary artery stent placement  Assessment & Plan:  No ongoing angina patient can discontinue his aspirin and just continue on chronic Plavix 75 mg once a day for prevention      3. Hyperlipidemia LDL goal <70  Assessment & Plan:  LDL is above goal mildly we will change his Lipitor to Crestor 20 nightly repeat lipids and LFTs next visit    Orders:  -     Lipid Panel; Future  -     Hepatic Function Panel; Future    4. Paroxysmal atrial fibrillation  Assessment & Plan:  Patient on Xarelto did instructed to make sure that he was taken with a full meal to promote absorption      5. Primary hypertension    6. History of (STEMI) due to occlusion of mid portion of left anterior descending (LAD) coronary artery    Other orders  -     rosuvastatin (CRESTOR) 20 MG tablet; Take 1 tablet by mouth Daily.  Dispense: 90 tablet; Refill: 3          Follow Up     Return in about 6 months (around 7/5/2022)  for EKG with F/U.          Patient was given instructions and counseling regarding his condition or for health maintenance advice. Please see specific information pulled into the AVS if appropriate.

## 2022-01-05 NOTE — ASSESSMENT & PLAN NOTE
Patient on Xarelto did instructed to make sure that he was taken with a full meal to promote absorption

## 2022-01-10 ENCOUNTER — TREATMENT (OUTPATIENT)
Dept: CARDIAC REHAB | Facility: HOSPITAL | Age: 52
End: 2022-01-10

## 2022-01-10 DIAGNOSIS — I21.02 ACUTE ST ELEVATION MYOCARDIAL INFARCTION (STEMI) DUE TO OCCLUSION OF MID PORTION OF LEFT ANTERIOR DESCENDING (LAD) CORONARY ARTERY: ICD-10-CM

## 2022-01-10 DIAGNOSIS — Z95.5 S/P CORONARY ARTERY STENT PLACEMENT: Primary | ICD-10-CM

## 2022-01-10 PROCEDURE — 93798 PHYS/QHP OP CAR RHAB W/ECG: CPT

## 2022-01-10 NOTE — PROGRESS NOTES
"CARDIAC/PULMONARY REHAB NUTRITION EDUCATION/ASSESSMENT      51 y.o.    Ht Readings from Last 1 Encounters:   01/05/22 175.3 cm (69\")     Wt Readings from Last 1 Encounters:   01/05/22 (!) 138 kg (304 lb)     BMI Readings from Last 1 Encounters:   01/05/22 44.89 kg/m²            CardiacRehab phase 2, session 6      EF%: 35  BP Readings from Last 1 Encounters:   01/05/22 140/87      Diet Survey Score: 59  Cardiac Risk Factors: HTN, Family History, Obesity, Hyperlipidemia and smoker Weight Assessment: Extremely Obese  Weight Change:  14 pound gain per pt report Unintentional  Usual Weight: 290 lb  Desired Weight: IBW 161lb, pt desires wt loss- unspecified     Current Diet: regular  Appetite: good   Factors limiting PO intake:    Taste/smell changes:  No Food records reviewed? Yes   Pt's 3 day diet recall shows missing food groups, lack of structure when eating (no consistent time or designated place to eat). Very low calorie foods during day (lots of coffee and fruit) and larger meals in evening.  Pt reports eating no processed foods. Pt eats no read meat but reports eating \"tons\" of veggies. Pt has a garden that he eats from in the summer. Reports no sweets in house but does report having 2-3 cookies a day. Ice cream on occasion in the summer. Pt's wife cooks with no salt but does report using processed foods (canned soups, frozen pizzas). Pt reports dinner can be vegan chili or chicken and rice, etc. Pt and wife disagree on types and amount of foods in pt diet. Inconsistencies in reporting noted as diet hx progressed.  Pt does express he is unsupported in his efforts.        Occupation:  unemployeed  Job Activity Level:mild    Routine Exercise: Pt reports use of stationary bike     Who does the patient live with: wife  Who does the cooking at home:  wife  Spouse/significant other's name:  Sammie  Spouse/significant other present for diet instruction today? Yes  Patient actively receiving lifestyle support from others " at home? No       Pertinent Lab Values:   Total: No components found for: CHOLESTEROL  HDL:   HDL Cholesterol   Date Value Ref Range Status   10/08/2021 32 (L) 40 - 60 mg/dL Final     LDL:  LDL Cholesterol    Date Value Ref Range Status   10/08/2021 78 0 - 100 mg/dL Final     Triglyceride: 251H  Last HGBA1C with date if applicable: 6.3H  Glucose:   Glucose   Date Value Ref Range Status   10/08/2021 116 (H) 65 - 99 mg/dL Final    Nutritional Supplements:     Pertinent Nutrition-Related Medications:    Current Outpatient Medications:   •  amLODIPine (NORVASC) 10 MG tablet, Take 10 mg by mouth Daily., Disp: , Rfl:   •  carvedilol (COREG) 25 MG tablet, Take 1 tablet by mouth 2 (Two) Times a Day With Meals., Disp: 180 tablet, Rfl: 3  •  clopidogrel (PLAVIX) 75 MG tablet, Take 1 tablet by mouth Daily., Disp: 90 tablet, Rfl: 3  •  ezetimibe (ZETIA) 10 MG tablet, Take 1 tablet by mouth Daily for 90 days., Disp: 90 tablet, Rfl: 3  •  furosemide (LASIX) 40 MG tablet, Take 1 tablet by mouth 2 (Two) Times a Day., Disp: 180 tablet, Rfl: 3  •  lisinopril (PRINIVIL,ZESTRIL) 10 MG tablet, Take 1 tablet by mouth Daily., Disp: 90 tablet, Rfl: 3  •  rivaroxaban (XARELTO) 20 MG tablet, Take 1 tablet by mouth Daily for 90 days., Disp: 90 tablet, Rfl: 3  •  rosuvastatin (CRESTOR) 20 MG tablet, Take 1 tablet by mouth Daily., Disp: 90 tablet, Rfl: 3     Stated Problem Areas / Concerns: Food and nutrition knowledge deficit related to limited adherence to nutrition recommendations as evidenced by self reported diet.     Assessment / Recommendations:     IBW: 73kg  Energy needs: 2500kcals  Protein needs: 87g  Saturated fat: 20 grams    Motivation level toward diet compliance: weak       Education:    Education Level:  Some college  Previous cardiac diet education prior to coming to Cardiac Rehab?  Yes   Instructed on:  Cardiac diet  Written materials given:  American Heart Association handouts and recommendations  Discipline  Teaching:  Cardiac Rehab Phase II [x]      Cardiac Rehab Phase III []      Pulmonary Rehab II []      Pulmonary Rehab III []      Peripheral Artery Disease []        Class Given:  Asthma Management []      Beginners Cardiac Rehab []      Beginners Pulmonary Rehab []      CAD I []      CAD II []      CHF []      Diabetes Mellitus []     Diet & Cholesterol []     Diet Consult [x]      Energy Conservation []     Exercise []     Grocery Store Tour []     Harmonica Therapy []     High Blood Pressure []     Living with COPD []     Medication Safety []     Peripheral Artery Disease []     S & S of Infection []      Stress []        Education Topics:  Angina []      Arrhythmias []      Asthma Management []      Beginning Cardiac Rehab []      Blood Pressure []     Blood Sugar []     Breathing Retrainment []     CHF []     Cooking []     Daily Weight []     Diabetes Mellitus []      Diet [x]     Energy Conservation []     Exercise []      Foot Care []      Grocery Store Tour []      Heart Disease []      Heart Function []      Incision Care []     Living with COPD []     Medication Safety []     PAD Symptoms/Treatment []     Reconditioning Exercises []     S & S Infection []     Smoking Consult []     Stress Management []     Test Results []       Teaching Aids:  Video []      PAD Handout []      Pulmonary Workbook []      CAD Teaching Packet []      Stress Teaching Packet []     Exercise Teaching Packet []      Diet Teaching Packet [x]      CHF Teaching Packet []      Blood Pressure Teaching Packet []      Smoking Cessation Packet []      Medication Safety Handout []      Pflex Training []      Diabetes Teaching Packet []      Harmonica Therapy Packet []        Teaching Method:  Discussion []      Written Information []      Audio Visual [x]      Demonstration []        Teaching Recipient:  Patient [x]      Family []      Friend []      Legal Guardian []      Primary Care Giver []      Significant Other []        Barriers  To Learning:  None [x]      Auditory []      Cultural []      Emotional []      Financial []      Language []    Mental []      Motivation []      Physical []      Reading Skills []      Hindu []      Verbal/Cognitive []      Visual []      Written/Cognitive []        Teaching Response:  Verified Via Teach back []      Return Demo Via Teach Back []      Reinforcement Needed [x]      Unable to Return Demo []      Unable to Comprehend []      Declines Teaching  []        Additional Education Topics:      Follow Up Instruction Comment:            Goals:    1. Establish regular meal schedule.    2. American Heart Association website for menu planning ideas.    3. Elicit support from wife.       Franchesca DRUMMONDSudheer Gardner, RD 1/10/2022 13:57 EST

## 2022-01-12 ENCOUNTER — TREATMENT (OUTPATIENT)
Dept: CARDIAC REHAB | Facility: HOSPITAL | Age: 52
End: 2022-01-12

## 2022-01-12 DIAGNOSIS — Z95.5 S/P CORONARY ARTERY STENT PLACEMENT: Primary | ICD-10-CM

## 2022-01-12 DIAGNOSIS — I21.02 ACUTE ST ELEVATION MYOCARDIAL INFARCTION (STEMI) DUE TO OCCLUSION OF MID PORTION OF LEFT ANTERIOR DESCENDING (LAD) CORONARY ARTERY: ICD-10-CM

## 2022-01-12 PROCEDURE — 93798 PHYS/QHP OP CAR RHAB W/ECG: CPT

## 2022-01-14 ENCOUNTER — TREATMENT (OUTPATIENT)
Dept: CARDIAC REHAB | Facility: HOSPITAL | Age: 52
End: 2022-01-14

## 2022-01-14 DIAGNOSIS — Z95.5 S/P CORONARY ARTERY STENT PLACEMENT: Primary | ICD-10-CM

## 2022-01-14 PROCEDURE — 93798 PHYS/QHP OP CAR RHAB W/ECG: CPT

## 2022-01-17 ENCOUNTER — TREATMENT (OUTPATIENT)
Dept: CARDIAC REHAB | Facility: HOSPITAL | Age: 52
End: 2022-01-17

## 2022-01-17 DIAGNOSIS — Z95.5 S/P CORONARY ARTERY STENT PLACEMENT: Primary | ICD-10-CM

## 2022-01-17 PROCEDURE — 93798 PHYS/QHP OP CAR RHAB W/ECG: CPT

## 2022-01-19 ENCOUNTER — TREATMENT (OUTPATIENT)
Dept: CARDIAC REHAB | Facility: HOSPITAL | Age: 52
End: 2022-01-19

## 2022-01-19 DIAGNOSIS — Z95.5 S/P CORONARY ARTERY STENT PLACEMENT: Primary | ICD-10-CM

## 2022-01-19 PROCEDURE — 93798 PHYS/QHP OP CAR RHAB W/ECG: CPT

## 2022-01-21 ENCOUNTER — TREATMENT (OUTPATIENT)
Dept: CARDIAC REHAB | Facility: HOSPITAL | Age: 52
End: 2022-01-21

## 2022-01-21 DIAGNOSIS — Z95.5 S/P CORONARY ARTERY STENT PLACEMENT: Primary | ICD-10-CM

## 2022-01-21 PROCEDURE — 93798 PHYS/QHP OP CAR RHAB W/ECG: CPT

## 2022-01-24 ENCOUNTER — TREATMENT (OUTPATIENT)
Dept: CARDIAC REHAB | Facility: HOSPITAL | Age: 52
End: 2022-01-24

## 2022-01-24 DIAGNOSIS — I21.02 ACUTE ST ELEVATION MYOCARDIAL INFARCTION (STEMI) DUE TO OCCLUSION OF MID PORTION OF LEFT ANTERIOR DESCENDING (LAD) CORONARY ARTERY: ICD-10-CM

## 2022-01-24 DIAGNOSIS — Z95.5 S/P CORONARY ARTERY STENT PLACEMENT: Primary | ICD-10-CM

## 2022-01-24 PROCEDURE — 93798 PHYS/QHP OP CAR RHAB W/ECG: CPT

## 2022-01-26 ENCOUNTER — TREATMENT (OUTPATIENT)
Dept: CARDIAC REHAB | Facility: HOSPITAL | Age: 52
End: 2022-01-26

## 2022-01-26 DIAGNOSIS — Z95.5 S/P CORONARY ARTERY STENT PLACEMENT: Primary | ICD-10-CM

## 2022-01-26 PROCEDURE — 93798 PHYS/QHP OP CAR RHAB W/ECG: CPT

## 2022-01-28 ENCOUNTER — APPOINTMENT (OUTPATIENT)
Dept: CARDIAC REHAB | Facility: HOSPITAL | Age: 52
End: 2022-01-28

## 2022-01-31 ENCOUNTER — APPOINTMENT (OUTPATIENT)
Dept: CARDIAC REHAB | Facility: HOSPITAL | Age: 52
End: 2022-01-31

## 2022-02-02 ENCOUNTER — TREATMENT (OUTPATIENT)
Dept: CARDIAC REHAB | Facility: HOSPITAL | Age: 52
End: 2022-02-02

## 2022-02-02 DIAGNOSIS — Z95.5 S/P CORONARY ARTERY STENT PLACEMENT: Primary | ICD-10-CM

## 2022-02-02 PROCEDURE — 93798 PHYS/QHP OP CAR RHAB W/ECG: CPT

## 2022-02-07 ENCOUNTER — TREATMENT (OUTPATIENT)
Dept: CARDIAC REHAB | Facility: HOSPITAL | Age: 52
End: 2022-02-07

## 2022-02-07 DIAGNOSIS — Z95.5 S/P CORONARY ARTERY STENT PLACEMENT: Primary | ICD-10-CM

## 2022-02-07 PROCEDURE — 93798 PHYS/QHP OP CAR RHAB W/ECG: CPT

## 2022-02-09 ENCOUNTER — TREATMENT (OUTPATIENT)
Dept: CARDIAC REHAB | Facility: HOSPITAL | Age: 52
End: 2022-02-09

## 2022-02-09 DIAGNOSIS — Z95.5 S/P CORONARY ARTERY STENT PLACEMENT: Primary | ICD-10-CM

## 2022-02-09 PROCEDURE — 93798 PHYS/QHP OP CAR RHAB W/ECG: CPT

## 2022-02-11 ENCOUNTER — TREATMENT (OUTPATIENT)
Dept: CARDIAC REHAB | Facility: HOSPITAL | Age: 52
End: 2022-02-11

## 2022-02-11 DIAGNOSIS — Z95.5 S/P CORONARY ARTERY STENT PLACEMENT: Primary | ICD-10-CM

## 2022-02-11 PROCEDURE — 93798 PHYS/QHP OP CAR RHAB W/ECG: CPT

## 2022-02-14 ENCOUNTER — APPOINTMENT (OUTPATIENT)
Dept: CARDIAC REHAB | Facility: HOSPITAL | Age: 52
End: 2022-02-14

## 2022-02-16 ENCOUNTER — TREATMENT (OUTPATIENT)
Dept: CARDIAC REHAB | Facility: HOSPITAL | Age: 52
End: 2022-02-16

## 2022-02-16 DIAGNOSIS — Z95.5 S/P CORONARY ARTERY STENT PLACEMENT: Primary | ICD-10-CM

## 2022-02-16 PROCEDURE — 93798 PHYS/QHP OP CAR RHAB W/ECG: CPT

## 2022-02-18 ENCOUNTER — TREATMENT (OUTPATIENT)
Dept: CARDIAC REHAB | Facility: HOSPITAL | Age: 52
End: 2022-02-18

## 2022-02-18 DIAGNOSIS — Z95.5 S/P CORONARY ARTERY STENT PLACEMENT: Primary | ICD-10-CM

## 2022-02-18 PROCEDURE — 93798 PHYS/QHP OP CAR RHAB W/ECG: CPT

## 2022-02-21 ENCOUNTER — TREATMENT (OUTPATIENT)
Dept: CARDIAC REHAB | Facility: HOSPITAL | Age: 52
End: 2022-02-21

## 2022-02-21 DIAGNOSIS — Z95.5 S/P CORONARY ARTERY STENT PLACEMENT: Primary | ICD-10-CM

## 2022-02-21 PROCEDURE — 93798 PHYS/QHP OP CAR RHAB W/ECG: CPT

## 2022-02-23 ENCOUNTER — TREATMENT (OUTPATIENT)
Dept: CARDIAC REHAB | Facility: HOSPITAL | Age: 52
End: 2022-02-23

## 2022-02-23 DIAGNOSIS — Z95.5 S/P CORONARY ARTERY STENT PLACEMENT: Primary | ICD-10-CM

## 2022-02-23 PROCEDURE — 93798 PHYS/QHP OP CAR RHAB W/ECG: CPT

## 2022-02-25 ENCOUNTER — TREATMENT (OUTPATIENT)
Dept: CARDIAC REHAB | Facility: HOSPITAL | Age: 52
End: 2022-02-25

## 2022-02-25 DIAGNOSIS — Z95.5 S/P CORONARY ARTERY STENT PLACEMENT: Primary | ICD-10-CM

## 2022-02-25 DIAGNOSIS — I21.02 ACUTE ST ELEVATION MYOCARDIAL INFARCTION (STEMI) DUE TO OCCLUSION OF MID PORTION OF LEFT ANTERIOR DESCENDING (LAD) CORONARY ARTERY: ICD-10-CM

## 2022-02-25 PROCEDURE — 93798 PHYS/QHP OP CAR RHAB W/ECG: CPT

## 2022-02-28 ENCOUNTER — TREATMENT (OUTPATIENT)
Dept: CARDIAC REHAB | Facility: HOSPITAL | Age: 52
End: 2022-02-28

## 2022-02-28 DIAGNOSIS — Z95.5 S/P CORONARY ARTERY STENT PLACEMENT: Primary | ICD-10-CM

## 2022-02-28 PROCEDURE — 93798 PHYS/QHP OP CAR RHAB W/ECG: CPT

## 2022-03-02 ENCOUNTER — APPOINTMENT (OUTPATIENT)
Dept: CARDIAC REHAB | Facility: HOSPITAL | Age: 52
End: 2022-03-02

## 2022-03-04 ENCOUNTER — TREATMENT (OUTPATIENT)
Dept: CARDIAC REHAB | Facility: HOSPITAL | Age: 52
End: 2022-03-04

## 2022-03-04 DIAGNOSIS — Z95.5 S/P CORONARY ARTERY STENT PLACEMENT: Primary | ICD-10-CM

## 2022-03-04 PROCEDURE — 93798 PHYS/QHP OP CAR RHAB W/ECG: CPT

## 2022-03-07 ENCOUNTER — TREATMENT (OUTPATIENT)
Dept: CARDIAC REHAB | Facility: HOSPITAL | Age: 52
End: 2022-03-07

## 2022-03-07 DIAGNOSIS — Z95.5 S/P CORONARY ARTERY STENT PLACEMENT: Primary | ICD-10-CM

## 2022-03-07 DIAGNOSIS — I21.02 ACUTE ST ELEVATION MYOCARDIAL INFARCTION (STEMI) DUE TO OCCLUSION OF MID PORTION OF LEFT ANTERIOR DESCENDING (LAD) CORONARY ARTERY: ICD-10-CM

## 2022-03-07 PROCEDURE — 93798 PHYS/QHP OP CAR RHAB W/ECG: CPT

## 2022-03-09 ENCOUNTER — APPOINTMENT (OUTPATIENT)
Dept: CARDIAC REHAB | Facility: HOSPITAL | Age: 52
End: 2022-03-09

## 2022-03-10 RX ORDER — ASPIRIN 81 MG/1
81 TABLET, CHEWABLE ORAL DAILY
COMMUNITY
Start: 2022-03-07 | End: 2023-03-08 | Stop reason: SDUPTHER

## 2022-03-10 RX ORDER — CARVEDILOL 25 MG/1
25 TABLET ORAL 2 TIMES DAILY WITH MEALS
Qty: 180 TABLET | Refills: 3 | Status: SHIPPED | OUTPATIENT
Start: 2022-03-10 | End: 2023-03-08 | Stop reason: SDUPTHER

## 2022-03-11 ENCOUNTER — APPOINTMENT (OUTPATIENT)
Dept: CARDIAC REHAB | Facility: HOSPITAL | Age: 52
End: 2022-03-11

## 2022-03-11 NOTE — TELEPHONE ENCOUNTER
ATTEMPTED TO CONTACT PT PER PROVIDER'S INSTRUCTIONS     NO ANSWER     LEFT VOICEMAIL WITH INSTRUCTIONS TO RETURN CALL TO OFFICE AT [phone]    OK FOR HUB TO ADVISE/READ   Len Nichols Jr., MD  [facility] 15 hours ago (5:14 PM)     Please notify pt that scripts have been sent to [facility]. Thank you.

## 2022-03-14 ENCOUNTER — TREATMENT (OUTPATIENT)
Dept: CARDIAC REHAB | Facility: HOSPITAL | Age: 52
End: 2022-03-14

## 2022-03-14 DIAGNOSIS — Z95.5 S/P CORONARY ARTERY STENT PLACEMENT: Primary | ICD-10-CM

## 2022-03-14 PROCEDURE — 93798 PHYS/QHP OP CAR RHAB W/ECG: CPT

## 2022-03-16 ENCOUNTER — TREATMENT (OUTPATIENT)
Dept: CARDIAC REHAB | Facility: HOSPITAL | Age: 52
End: 2022-03-16

## 2022-03-16 DIAGNOSIS — Z95.5 S/P CORONARY ARTERY STENT PLACEMENT: Primary | ICD-10-CM

## 2022-03-16 PROCEDURE — 93798 PHYS/QHP OP CAR RHAB W/ECG: CPT

## 2022-03-18 ENCOUNTER — TREATMENT (OUTPATIENT)
Dept: CARDIAC REHAB | Facility: HOSPITAL | Age: 52
End: 2022-03-18

## 2022-03-18 DIAGNOSIS — Z95.5 S/P CORONARY ARTERY STENT PLACEMENT: Primary | ICD-10-CM

## 2022-03-18 PROCEDURE — 93798 PHYS/QHP OP CAR RHAB W/ECG: CPT

## 2022-03-21 ENCOUNTER — TREATMENT (OUTPATIENT)
Dept: CARDIAC REHAB | Facility: HOSPITAL | Age: 52
End: 2022-03-21

## 2022-03-21 DIAGNOSIS — Z95.5 S/P CORONARY ARTERY STENT PLACEMENT: Primary | ICD-10-CM

## 2022-03-21 PROCEDURE — 93798 PHYS/QHP OP CAR RHAB W/ECG: CPT

## 2022-03-23 ENCOUNTER — TREATMENT (OUTPATIENT)
Dept: CARDIAC REHAB | Facility: HOSPITAL | Age: 52
End: 2022-03-23

## 2022-03-23 DIAGNOSIS — I21.02 ACUTE ST ELEVATION MYOCARDIAL INFARCTION (STEMI) DUE TO OCCLUSION OF MID PORTION OF LEFT ANTERIOR DESCENDING (LAD) CORONARY ARTERY: ICD-10-CM

## 2022-03-23 DIAGNOSIS — Z95.5 S/P CORONARY ARTERY STENT PLACEMENT: Primary | ICD-10-CM

## 2022-03-23 PROCEDURE — 93798 PHYS/QHP OP CAR RHAB W/ECG: CPT

## 2022-03-25 ENCOUNTER — TREATMENT (OUTPATIENT)
Dept: CARDIAC REHAB | Facility: HOSPITAL | Age: 52
End: 2022-03-25

## 2022-03-25 DIAGNOSIS — Z95.5 S/P CORONARY ARTERY STENT PLACEMENT: Primary | ICD-10-CM

## 2022-03-25 PROCEDURE — 93798 PHYS/QHP OP CAR RHAB W/ECG: CPT

## 2022-03-28 ENCOUNTER — TREATMENT (OUTPATIENT)
Dept: CARDIAC REHAB | Facility: HOSPITAL | Age: 52
End: 2022-03-28

## 2022-03-28 DIAGNOSIS — Z95.5 S/P CORONARY ARTERY STENT PLACEMENT: Primary | ICD-10-CM

## 2022-03-28 PROCEDURE — 93798 PHYS/QHP OP CAR RHAB W/ECG: CPT

## 2022-03-30 ENCOUNTER — TREATMENT (OUTPATIENT)
Dept: CARDIAC REHAB | Facility: HOSPITAL | Age: 52
End: 2022-03-30

## 2022-03-30 DIAGNOSIS — Z95.5 S/P CORONARY ARTERY STENT PLACEMENT: Primary | ICD-10-CM

## 2022-03-30 PROCEDURE — 93798 PHYS/QHP OP CAR RHAB W/ECG: CPT

## 2022-04-01 ENCOUNTER — TREATMENT (OUTPATIENT)
Dept: CARDIAC REHAB | Facility: HOSPITAL | Age: 52
End: 2022-04-01

## 2022-04-01 DIAGNOSIS — Z95.5 S/P CORONARY ARTERY STENT PLACEMENT: Primary | ICD-10-CM

## 2022-04-01 PROCEDURE — 93798 PHYS/QHP OP CAR RHAB W/ECG: CPT

## 2022-04-04 ENCOUNTER — APPOINTMENT (OUTPATIENT)
Dept: CARDIAC REHAB | Facility: HOSPITAL | Age: 52
End: 2022-04-04

## 2022-04-06 ENCOUNTER — APPOINTMENT (OUTPATIENT)
Dept: CARDIAC REHAB | Facility: HOSPITAL | Age: 52
End: 2022-04-06

## 2022-04-08 ENCOUNTER — APPOINTMENT (OUTPATIENT)
Dept: CARDIAC REHAB | Facility: HOSPITAL | Age: 52
End: 2022-04-08

## 2022-04-11 ENCOUNTER — OFFICE VISIT (OUTPATIENT)
Dept: INTERNAL MEDICINE | Facility: CLINIC | Age: 52
End: 2022-04-11

## 2022-04-11 ENCOUNTER — APPOINTMENT (OUTPATIENT)
Dept: CARDIAC REHAB | Facility: HOSPITAL | Age: 52
End: 2022-04-11

## 2022-04-11 VITALS
WEIGHT: 306 LBS | TEMPERATURE: 97.8 F | DIASTOLIC BLOOD PRESSURE: 85 MMHG | SYSTOLIC BLOOD PRESSURE: 135 MMHG | HEART RATE: 87 BPM | BODY MASS INDEX: 45.32 KG/M2 | HEIGHT: 69 IN | OXYGEN SATURATION: 96 %

## 2022-04-11 DIAGNOSIS — E78.5 HYPERLIPIDEMIA LDL GOAL <70: ICD-10-CM

## 2022-04-11 DIAGNOSIS — Z95.5 S/P CORONARY ARTERY STENT PLACEMENT: ICD-10-CM

## 2022-04-11 DIAGNOSIS — Z12.2 SCREENING FOR LUNG CANCER: ICD-10-CM

## 2022-04-11 DIAGNOSIS — F17.210 SMOKES LESS THAN 1 PACK A DAY WITH GREATER THAN 30 PACK YEAR HISTORY: ICD-10-CM

## 2022-04-11 DIAGNOSIS — Z12.11 COLON CANCER SCREENING: ICD-10-CM

## 2022-04-11 DIAGNOSIS — Z23 NEED FOR VACCINATION: ICD-10-CM

## 2022-04-11 DIAGNOSIS — I48.0 PAROXYSMAL ATRIAL FIBRILLATION: ICD-10-CM

## 2022-04-11 DIAGNOSIS — R73.01 IMPAIRED FASTING GLUCOSE: Primary | ICD-10-CM

## 2022-04-11 DIAGNOSIS — I50.22 CHRONIC SYSTOLIC CHF (CONGESTIVE HEART FAILURE): ICD-10-CM

## 2022-04-11 DIAGNOSIS — Z11.59 NEED FOR HEPATITIS C SCREENING TEST: ICD-10-CM

## 2022-04-11 LAB
ALBUMIN SERPL-MCNC: 4.5 G/DL (ref 3.5–5.2)
ALBUMIN/GLOB SERPL: 1.5 G/DL
ALP SERPL-CCNC: 65 U/L (ref 39–117)
ALT SERPL W P-5'-P-CCNC: 27 U/L (ref 1–41)
ANION GAP SERPL CALCULATED.3IONS-SCNC: 12.7 MMOL/L (ref 5–15)
AST SERPL-CCNC: 26 U/L (ref 1–40)
BASOPHILS # BLD AUTO: 0.06 10*3/MM3 (ref 0–0.2)
BASOPHILS NFR BLD AUTO: 0.7 % (ref 0–1.5)
BILIRUB SERPL-MCNC: 0.5 MG/DL (ref 0–1.2)
BUN SERPL-MCNC: 14 MG/DL (ref 6–20)
BUN/CREAT SERPL: 11.2 (ref 7–25)
CALCIUM SPEC-SCNC: 9.6 MG/DL (ref 8.6–10.5)
CHLORIDE SERPL-SCNC: 103 MMOL/L (ref 98–107)
CHOLEST SERPL-MCNC: 184 MG/DL (ref 0–200)
CO2 SERPL-SCNC: 22.3 MMOL/L (ref 22–29)
CREAT SERPL-MCNC: 1.25 MG/DL (ref 0.76–1.27)
DEPRECATED RDW RBC AUTO: 42.1 FL (ref 37–54)
EGFRCR SERPLBLD CKD-EPI 2021: 70.2 ML/MIN/1.73
EOSINOPHIL # BLD AUTO: 0.19 10*3/MM3 (ref 0–0.4)
EOSINOPHIL NFR BLD AUTO: 2.3 % (ref 0.3–6.2)
ERYTHROCYTE [DISTWIDTH] IN BLOOD BY AUTOMATED COUNT: 13.6 % (ref 12.3–15.4)
GLOBULIN UR ELPH-MCNC: 3.1 GM/DL
GLUCOSE SERPL-MCNC: 103 MG/DL (ref 65–99)
HBA1C MFR BLD: 6.3 % (ref 4.8–5.6)
HCT VFR BLD AUTO: 49.4 % (ref 37.5–51)
HCV AB SER DONR QL: NORMAL
HDLC SERPL-MCNC: 23 MG/DL (ref 40–60)
HGB BLD-MCNC: 16.9 G/DL (ref 13–17.7)
IMM GRANULOCYTES # BLD AUTO: 0.02 10*3/MM3 (ref 0–0.05)
IMM GRANULOCYTES NFR BLD AUTO: 0.2 % (ref 0–0.5)
LDLC SERPL CALC-MCNC: 98 MG/DL (ref 0–100)
LDLC/HDLC SERPL: 3.73 {RATIO}
LYMPHOCYTES # BLD AUTO: 2.24 10*3/MM3 (ref 0.7–3.1)
LYMPHOCYTES NFR BLD AUTO: 27.5 % (ref 19.6–45.3)
MCH RBC QN AUTO: 29.4 PG (ref 26.6–33)
MCHC RBC AUTO-ENTMCNC: 34.2 G/DL (ref 31.5–35.7)
MCV RBC AUTO: 86.1 FL (ref 79–97)
MONOCYTES # BLD AUTO: 0.96 10*3/MM3 (ref 0.1–0.9)
MONOCYTES NFR BLD AUTO: 11.8 % (ref 5–12)
NEUTROPHILS NFR BLD AUTO: 4.67 10*3/MM3 (ref 1.7–7)
NEUTROPHILS NFR BLD AUTO: 57.5 % (ref 42.7–76)
NRBC BLD AUTO-RTO: 0 /100 WBC (ref 0–0.2)
PLATELET # BLD AUTO: 305 10*3/MM3 (ref 140–450)
PMV BLD AUTO: 10.5 FL (ref 6–12)
POTASSIUM SERPL-SCNC: 4.1 MMOL/L (ref 3.5–5.2)
PROT SERPL-MCNC: 7.6 G/DL (ref 6–8.5)
RBC # BLD AUTO: 5.74 10*6/MM3 (ref 4.14–5.8)
SODIUM SERPL-SCNC: 138 MMOL/L (ref 136–145)
TRIGL SERPL-MCNC: 376 MG/DL (ref 0–150)
VLDLC SERPL-MCNC: 63 MG/DL (ref 5–40)
WBC NRBC COR # BLD: 8.14 10*3/MM3 (ref 3.4–10.8)

## 2022-04-11 PROCEDURE — 83036 HEMOGLOBIN GLYCOSYLATED A1C: CPT | Performed by: INTERNAL MEDICINE

## 2022-04-11 PROCEDURE — 80061 LIPID PANEL: CPT | Performed by: INTERNAL MEDICINE

## 2022-04-11 PROCEDURE — 99214 OFFICE O/P EST MOD 30 MIN: CPT | Performed by: INTERNAL MEDICINE

## 2022-04-11 PROCEDURE — 80053 COMPREHEN METABOLIC PANEL: CPT | Performed by: INTERNAL MEDICINE

## 2022-04-11 PROCEDURE — 90471 IMMUNIZATION ADMIN: CPT | Performed by: INTERNAL MEDICINE

## 2022-04-11 PROCEDURE — 90750 HZV VACC RECOMBINANT IM: CPT | Performed by: INTERNAL MEDICINE

## 2022-04-11 PROCEDURE — 82043 UR ALBUMIN QUANTITATIVE: CPT | Performed by: INTERNAL MEDICINE

## 2022-04-11 PROCEDURE — 86803 HEPATITIS C AB TEST: CPT | Performed by: INTERNAL MEDICINE

## 2022-04-11 PROCEDURE — 85025 COMPLETE CBC W/AUTO DIFF WBC: CPT | Performed by: INTERNAL MEDICINE

## 2022-04-11 NOTE — PROGRESS NOTES
"Chief Complaint  Atrial Fibrillation (4 month follow-up), Hypertension (4 month follow-up), and Hyperlipidemia (4 month follow-up)    Subjective          Yefri Mcgee presents to [facility]  History of Present Illness  hypertension- patient denies Has, dizziness, chest pain. Patient reports 1 episode of pre-syncope when Blood Pressure got to 100/70s.   Hyperglycemia- due for recheck. Patient reports having eye exam in 12/2021  hyperlipidemia- doing well on statin and zetia.   afib - doing well on xarelto. Patient denies bleeding problems.   coronary artery disease status post percutaneous coronary intervention - patient is on aspirin, plavix, BB, and statin  CHF- patient reports completing cardiac rehab recently. Patient reports doing well with metabolic equivalents.     Current Outpatient Medications   Medication Instructions   • amLODIPine (NORVASC) 10 mg, Oral, Daily   • aspirin 81 mg, Oral, Daily   • carvedilol (COREG) 25 mg, Oral, 2 Times Daily With Meals   • clopidogrel (PLAVIX) 75 mg, Oral, Daily   • ezetimibe (ZETIA) 10 mg, Oral, Daily   • furosemide (LASIX) 40 mg, Oral, 2 Times Daily   • lisinopril (PRINIVIL,ZESTRIL) 10 mg, Oral, Every 24 Hours Scheduled   • rivaroxaban (XARELTO) 20 mg, Oral, Daily   • rosuvastatin (CRESTOR) 20 mg, Oral, Daily       The following portions of the patient's history were reviewed and updated as appropriate: allergies, current medications, past family history, past medical history, past social history, past surgical history, and problem list.    Objective   Vital Signs:   /85 (BP Location: Right arm, Patient Position: Sitting, Cuff Size: Large Adult)   Pulse 87   Temp 97.8 °F (36.6 °C) (Temporal)   Ht 175.3 cm (69\")   Wt (!) 139 kg (306 lb)   SpO2 96%   BMI 45.19 kg/m²     Wt Readings from Last 3 Encounters:   04/11/22 (!) 139 kg (306 lb)   01/05/22 (!) 138 kg (304 lb)   12/21/21 (!) 137 kg (302 lb 7.5 oz)     BP " Readings from Last 3 Encounters:   04/11/22 135/85   01/05/22 140/87   12/21/21 140/90     Physical Exam   Appearance: No acute distress, well-nourished  Head: normocephalic, atraumatic  Eyes: extraocular movements intact, no scleral icterus, no conjunctival injection  Ears, Nose, and Throat: external ears normal, nares patent, moist mucous membranes  Cardiovascular: regular rate and rhythm. no murmurs, rubs, or gallops. no edema  Respiratory: breathing comfortably, symmetric chest rise, clear to auscultation bilaterally. No wheezes, rales, or rhonchi.  Neuro: alert and oriented to time, place, and person. Normal gait  Psych: normal mood and affect     Diabetic foot exam:   Left: Filament test present   Pulses Dorsalis Pedis:  present   Vibratory sensation normal   Proprioception normal   Sharp/dull discrimination normal       Right: Filament test present   Pulses Dorsalis Pedis:  present   Vibratory sensation normal   Proprioception normal   Sharp/dull discrimination normal        Result Review :   The following data was reviewed by: Len Nichols Jr, MD on 04/11/2022:  Common labs    Common Labsle 7/30/21 7/30/21 7/30/21 8/9/21 8/9/21 10/8/21 10/8/21 10/8/21 10/8/21    0606 0606 0606 0908 0908 1331 1331 1331 1331   Glucose   117 (A)  164 (A)   116 (A)    BUN   14  15   11    Creatinine   1.20  1.40 (A)   1.13    eGFR Non  Am   64  54 (A)   69    Sodium   136  140   138    Potassium   3.5  4.2   4.2    Chloride   103  103   102    Calcium   8.5 (A)  9.2   9.5    Albumin     4.00   4.30    Total Bilirubin     0.5   0.4    Alkaline Phosphatase     57   67    AST (SGOT)     18   22    ALT (SGPT)     15   25    WBC 11.08 (A)   13.21 (A)  9.69      Hemoglobin 15.1   14.3  14.6      Hematocrit 44.3   42.4  43.3      Platelets 251   377  302      Total Cholesterol  166     152     Triglycerides  231 (A)     251 (A)     HDL Cholesterol  29 (A)     32 (A)     LDL Cholesterol   97     78     Hemoglobin A1C          6.38 (A)   (A) Abnormal value              Lab Results   Component Value Date    INR 0.95 (L) 07/28/2021            Assessment and Plan    Diagnoses and all orders for this visit:    1. Impaired fasting glucose (Primary)  -     Hemoglobin A1c  -     MicroAlbumin, Urine, Random - Urine, Clean Catch    2. Need for hepatitis C screening test  -     Hepatitis C antibody    3. S/P coronary artery stent placement  Comments:  cont BB, statin, and ASA, plavix  Overview:   Acute anterior/anterolateral STEMI 7/21    1. Successful emergent PCI to the culprit proximal-mid LAD using Xience Bridgette 3.5/23 and 3.25/28 mm LULY, post-dilated with a NC Trek 3.75/15 mm balloon up to 20 chantal.  Pre-procedure 100% occlusion with significant thrombus present and ROSEMARIE-0 flow, post-procedure 0% residual stenosis with ROSEMARIE-3 flow.  Type C lesion.  No evidence of complications.  2. Residual moderate non-obstructive disease in the circumflex and RCA, as detailed above.  3. Moderately reduced left ventricular systolic function with an estimated ejection fraction of 35% and severe mid-distal anterior/apical hypokinesis.  4. Normal LVEDP.        4. Need for vaccination  -     Shingrix Vaccine    5. Screening for lung cancer  -     CT Chest Low Dose Wo; Future    6. Smokes less than 1 pack a day with greater than 30 pack year history  -     CT Chest Low Dose Wo; Future    7. Paroxysmal atrial fibrillation  Comments:  rate controlled today. cont xarelto.     8. Hyperlipidemia LDL goal <70  Comments:  chck labs. consider higher dose statin if needed.   Orders:  -     Lipid Panel  -     Comprehensive Metabolic Panel    9. Chronic systolic CHF  Comments:  cardiology notes reviewed. euvolemic today.  Overview:  07/28/2021 Heart Cath: Moderately reduced left ventricular systolic function with an estimated ejection fraction of 35%     Orders:  -     Comprehensive Metabolic Panel  -     CBC & Differential    10. Colon cancer screening  -     Cologuard  - Stool, Per Rectum; Future      There are no discontinued medications.       Follow Up   Return in about 4 months (around 8/11/2022) for Recheck.  Patient was given instructions and counseling regarding his condition or for health maintenance advice. Please see specific information pulled into the AVS if appropriate.       Len Nichols Jr, MD  04/11/22  14:00 EDT

## 2022-04-12 LAB — ALBUMIN UR-MCNC: 5 MG/DL

## 2022-04-13 ENCOUNTER — APPOINTMENT (OUTPATIENT)
Dept: CARDIAC REHAB | Facility: HOSPITAL | Age: 52
End: 2022-04-13

## 2022-04-15 ENCOUNTER — APPOINTMENT (OUTPATIENT)
Dept: CARDIAC REHAB | Facility: HOSPITAL | Age: 52
End: 2022-04-15

## 2022-04-18 ENCOUNTER — APPOINTMENT (OUTPATIENT)
Dept: CARDIAC REHAB | Facility: HOSPITAL | Age: 52
End: 2022-04-18

## 2022-04-20 ENCOUNTER — APPOINTMENT (OUTPATIENT)
Dept: CARDIAC REHAB | Facility: HOSPITAL | Age: 52
End: 2022-04-20

## 2022-04-22 ENCOUNTER — APPOINTMENT (OUTPATIENT)
Dept: CARDIAC REHAB | Facility: HOSPITAL | Age: 52
End: 2022-04-22

## 2022-04-25 ENCOUNTER — APPOINTMENT (OUTPATIENT)
Dept: CARDIAC REHAB | Facility: HOSPITAL | Age: 52
End: 2022-04-25

## 2022-04-27 ENCOUNTER — APPOINTMENT (OUTPATIENT)
Dept: CARDIAC REHAB | Facility: HOSPITAL | Age: 52
End: 2022-04-27

## 2022-04-29 ENCOUNTER — APPOINTMENT (OUTPATIENT)
Dept: CARDIAC REHAB | Facility: HOSPITAL | Age: 52
End: 2022-04-29

## 2022-05-02 ENCOUNTER — APPOINTMENT (OUTPATIENT)
Dept: CARDIAC REHAB | Facility: HOSPITAL | Age: 52
End: 2022-05-02

## 2022-05-04 ENCOUNTER — APPOINTMENT (OUTPATIENT)
Dept: CARDIAC REHAB | Facility: HOSPITAL | Age: 52
End: 2022-05-04

## 2022-05-16 ENCOUNTER — HOSPITAL ENCOUNTER (OUTPATIENT)
Dept: CT IMAGING | Facility: HOSPITAL | Age: 52
Discharge: HOME | End: 2022-05-16
Admitting: INTERNAL MEDICINE

## 2022-05-16 DIAGNOSIS — Z12.2 SCREENING FOR LUNG CANCER: ICD-10-CM

## 2022-05-16 DIAGNOSIS — F17.210 SMOKES LESS THAN 1 PACK A DAY WITH GREATER THAN 30 PACK YEAR HISTORY: ICD-10-CM

## 2022-05-16 PROCEDURE — 71271 CT THORAX LUNG CANCER SCR C-: CPT

## 2022-05-17 ENCOUNTER — TELEPHONE (OUTPATIENT)
Dept: INTERNAL MEDICINE | Facility: CLINIC | Age: 52
End: 2022-05-17

## 2022-05-17 NOTE — TELEPHONE ENCOUNTER
ATTEMPTED TO CONTACT PT PER PROVIDER'S INSTRUCTIONS     NO ANSWER     LEFT VOICEMAIL WITH INSTRUCTIONS TO RETURN CALL TO OFFICE AT [phone]    OK FOR HUB TO ADVISE/READ   Len Nichols Jr., MD 17 minutes ago (1:11 PM)        Negative CT scan. I think it was posted to his mychart as well.

## 2022-05-17 NOTE — TELEPHONE ENCOUNTER
Caller: [facility]    Relationship: Emergency Contact    Best call back number: [phone]    What test was performed: CT    When was the test performed: 5/16/22    Where was the test performed: [facility]     Additional notes:REQUESTING CALL FOR RESULTS

## 2022-07-12 ENCOUNTER — OFFICE VISIT (OUTPATIENT)
Dept: CARDIOLOGY | Facility: CLINIC | Age: 52
End: 2022-07-12

## 2022-07-12 VITALS
HEART RATE: 73 BPM | SYSTOLIC BLOOD PRESSURE: 120 MMHG | BODY MASS INDEX: 43.96 KG/M2 | HEIGHT: 69 IN | WEIGHT: 296.8 LBS | DIASTOLIC BLOOD PRESSURE: 81 MMHG

## 2022-07-12 DIAGNOSIS — I10 PRIMARY HYPERTENSION: ICD-10-CM

## 2022-07-12 DIAGNOSIS — Z95.5 S/P CORONARY ARTERY STENT PLACEMENT: ICD-10-CM

## 2022-07-12 DIAGNOSIS — I50.22 CHRONIC SYSTOLIC CHF (CONGESTIVE HEART FAILURE): Primary | ICD-10-CM

## 2022-07-12 DIAGNOSIS — I48.0 PAROXYSMAL ATRIAL FIBRILLATION: ICD-10-CM

## 2022-07-12 DIAGNOSIS — E78.5 HYPERLIPIDEMIA LDL GOAL <70: ICD-10-CM

## 2022-07-12 PROCEDURE — 99214 OFFICE O/P EST MOD 30 MIN: CPT | Performed by: INTERNAL MEDICINE

## 2022-07-12 NOTE — PROGRESS NOTES
Chief Complaint  Hyperlipidemia and Hypertension    Subjective    Patient has had no problems with chest pain or increased rest of breath since being seen last.  He denies any ongoing myalgias symptoms    Past Medical History:   Diagnosis Date   • Broken bones    • Chronic kidney disease    • Chronic systolic CHF 8/9/2021 07/28/2021 Heart Cath: Moderately reduced left ventricular systolic function with an estimated ejection fraction of 35%    • Head injury    • Hyperlipidemia    • Hypertension    • Paroxysmal atrial fibrillation 01/15/2019   • PFO (patent foramen ovale) 06/2014   • STEMI involving left anterior descending coronary artery 07/2021    also previous PCI in 2014   • Stroke          Current Outpatient Medications:   •  amLODIPine (NORVASC) 10 MG tablet, Take 10 mg by mouth Daily., Disp: , Rfl:   •  aspirin 81 MG chewable tablet, Chew 81 mg Daily., Disp: , Rfl:   •  carvedilol (COREG) 25 MG tablet, Take 1 tablet by mouth 2 (Two) Times a Day With Meals., Disp: 180 tablet, Rfl: 3  •  clopidogrel (PLAVIX) 75 MG tablet, Take 1 tablet by mouth Daily., Disp: 90 tablet, Rfl: 3  •  ezetimibe (ZETIA) 10 MG tablet, Take 1 tablet by mouth Daily for 90 days., Disp: 90 tablet, Rfl: 3  •  furosemide (LASIX) 40 MG tablet, Take 1 tablet by mouth 2 (Two) Times a Day., Disp: 180 tablet, Rfl: 3  •  lisinopril (PRINIVIL,ZESTRIL) 10 MG tablet, Take 1 tablet by mouth Daily., Disp: 90 tablet, Rfl: 3  •  rivaroxaban (XARELTO) 20 MG tablet, Take 1 tablet by mouth Daily for 90 days., Disp: 90 tablet, Rfl: 3  •  rosuvastatin (CRESTOR) 20 MG tablet, Take 1 tablet by mouth Daily., Disp: 90 tablet, Rfl: 3    There are no discontinued medications.  Allergies   Allergen Reactions   • Eliquis [Apixaban] Paresthesia        Social History     Tobacco Use   • Smoking status: Current Every Day Smoker     Packs/day: 1.00     Years: 35.00     Pack years: 35.00     Types: Cigarettes     Start date: 1985   • Smokeless tobacco: Never Used   •  "Tobacco comment: CURRENT EVERYDAY SMOKER, SMOKED 31 OR MORE YEARS     Vaping Use   • Vaping Use: Former   • Substances: Nicotine, Flavoring   • Devices: Refillable tank   Substance Use Topics   • Alcohol use: Yes     Comment: DRINKS monthly, LESS THAN 1 DRINK PER DAY, HAS BEEN DRINKING FOR 31 OR MORE YEARS   • Drug use: Never       Family History   Problem Relation Age of Onset   • Cancer Mother    • Heart disease Father    • Diabetes Father         Objective     /81   Pulse 73   Ht 175.3 cm (69\")   Wt 135 kg (296 lb 12.8 oz)   BMI 43.83 kg/m²       Physical Exam    General Appearance:   · no acute distress  · Alert and oriented x3  HENT:   · lips not cyanotic  · Atraumatic  Neck:  · No jvd   · supple  Respiratory:  · no respiratory distress  · normal breath sounds  · no rales  Cardiovascular:  · Regular rate and rhythm  · no S3, no S4   · no murmur  · no rub  Extremities  · No cyanosis  · lower extremity edema: none    Skin:   · warm, dry  · No rashes      Result Review :     proBNP   Date Value Ref Range Status   10/08/2021 887.9 (H) 0.0 - 450.0 pg/mL Final     CMP    CMP 8/9/21 10/8/21 4/11/22   Glucose 164 (A) 116 (A) 103 (A)   BUN 15 11 14   Creatinine 1.40 (A) 1.13 1.25   eGFR Non African Am 54 (A) 69    Sodium 140 138 138   Potassium 4.2 4.2 4.1   Chloride 103 102 103   Calcium 9.2 9.5 9.6   Albumin 4.00 4.30 4.50   Total Bilirubin 0.5 0.4 0.5   Alkaline Phosphatase 57 67 65   AST (SGOT) 18 22 26   ALT (SGPT) 15 25 27   (A) Abnormal value            CBC w/diff    CBC w/Diff 8/9/21 10/8/21 4/11/22   WBC 13.21 (A) 9.69 8.14   RBC 4.87 5.03 5.74   Hemoglobin 14.3 14.6 16.9   Hematocrit 42.4 43.3 49.4   MCV 87.1 86.1 86.1   MCH 29.4 29.0 29.4   MCHC 33.7 33.7 34.2   RDW 12.9 13.7 13.6   Platelets 377 302 305   Neutrophil Rel % 66.5 58.8 57.5   Immature Granulocyte Rel % 0.4 0.2 0.2   Lymphocyte Rel % 20.1 28.2 27.5   Monocyte Rel % 10.3 9.2 11.8   Eosinophil Rel % 1.9 2.6 2.3   Basophil Rel % 0.8 1.0 " 0.7   (A) Abnormal value             Lab Results   Component Value Date    TSH 1.400 06/07/2021      No results found for: FREET4   No results found for: DDIMERQUANT  Magnesium   Date Value Ref Range Status   07/28/2021 1.8 1.6 - 2.6 mg/dL Final      No results found for: DIGOXIN   Lab Results   Component Value Date    TROPONINT <0.01 01/03/2019           Lipid Panel    Lipid Panel 7/30/21 10/8/21 4/11/22   Total Cholesterol 166 152 184   Triglycerides 231 (A) 251 (A) 376 (A)   HDL Cholesterol 29 (A) 32 (A) 23 (A)   VLDL Cholesterol 40 42 (A) 63 (A)   LDL Cholesterol  97 78 98   LDL/HDL Ratio 3.13 2.18 3.73   (A) Abnormal value            Lab Results   Component Value Date    POCTROP 0.04 07/28/2021                      Diagnoses and all orders for this visit:    1. Chronic systolic CHF (Primary)  Assessment & Plan:  Stable from a volume standpoint encourage weight loss and low-sodium diet repeating echocardiogram to reassess LV function    Orders:  -     Adult Transthoracic Echo Complete W/ Cont if Necessary Per Protocol; Future    2. S/P coronary artery stent placement  Assessment & Plan:  Patient is doing well no ongoing angina continue with chronic aspirin 81 mg daily and Plavix 75 daily        3. Hyperlipidemia LDL goal <70  Assessment & Plan:  Continue on Crestor 20 nightly LDL goal and symptomatic stable      4. Paroxysmal atrial fibrillation    5. Primary hypertension          Follow Up     Return in about 6 months (around 1/12/2023) for EKG with F/U.          Patient was given instructions and counseling regarding his condition or for health maintenance advice. Please see specific information pulled into the AVS if appropriate.

## 2022-07-12 NOTE — ASSESSMENT & PLAN NOTE
Stable from a volume standpoint encourage weight loss and low-sodium diet repeating echocardiogram to reassess LV function

## 2022-07-12 NOTE — ASSESSMENT & PLAN NOTE
Patient is doing well no ongoing angina continue with chronic aspirin 81 mg daily and Plavix 75 daily

## 2022-08-31 ENCOUNTER — TELEPHONE (OUTPATIENT)
Dept: CARDIOLOGY | Facility: CLINIC | Age: 52
End: 2022-08-31

## 2022-08-31 DIAGNOSIS — I50.22 CHRONIC SYSTOLIC CHF (CONGESTIVE HEART FAILURE): Primary | ICD-10-CM

## 2022-08-31 NOTE — TELEPHONE ENCOUNTER
----- Message from ROYCE Browne sent at 8/31/2022  8:30 AM EDT -----  Notify pt echo result: EF 35% which means that there is weakened heart muscle function. Dr Alonso was unable to tell if there was an apical thrombus or if the left ventricle was just enlarged so he is recommending the echo be redone with contrast. This can be done in our office. Please let me know if patient is agreeable so I can reorder the test

## 2022-08-31 NOTE — TELEPHONE ENCOUNTER
I spoke to patients wife and gave results. She was agreeable to have new echo. I have transferred her to scheduling.

## 2022-09-14 ENCOUNTER — TELEPHONE (OUTPATIENT)
Dept: INTERNAL MEDICINE | Facility: CLINIC | Age: 52
End: 2022-09-14

## 2022-09-14 NOTE — TELEPHONE ENCOUNTER
----- Message from Len Nichols Jr., MD sent at 9/14/2022 10:48 AM EDT -----  Yes. May cancel. Please schedule annual physical with me in next 1-2 months. Thank you.   ----- Message -----  From: Renetta Luke  Sent: 9/14/2022  10:30 AM EDT  To: Len Nichols Jr., MD    Patient has been contacted per protocol. Ok to cancel?  ----- Message -----  From: SYSTEM  Sent: 4/16/2022   1:14 AM EDT  To: [facility]

## 2022-09-14 NOTE — TELEPHONE ENCOUNTER
ATTEMPTED TO CONTACT PATIENT TO SCHEDULE PHYSICAL. LEFT MESSAGE TO CALL OUT OFFICE BACK.      HUB OK TO READ/ADVISE:  PLEASE SCHEDULE PATIENT FOR ANNUAL PHYSICAL IN THE NEXT 1-2 MONTHS.

## 2022-10-12 ENCOUNTER — TELEPHONE (OUTPATIENT)
Dept: CARDIOLOGY | Facility: CLINIC | Age: 52
End: 2022-10-12

## 2022-10-12 DIAGNOSIS — I50.22 CHRONIC SYSTOLIC CHF (CONGESTIVE HEART FAILURE): Primary | ICD-10-CM

## 2022-10-12 RX ORDER — SACUBITRIL AND VALSARTAN 24; 26 MG/1; MG/1
1 TABLET, FILM COATED ORAL 2 TIMES DAILY
Qty: 180 TABLET | Refills: 3 | Status: SHIPPED | OUTPATIENT
Start: 2022-10-12 | End: 2023-03-08 | Stop reason: SDUPTHER

## 2022-10-12 NOTE — TELEPHONE ENCOUNTER
----- Message from ROYCE Browne sent at 10/11/2022  5:13 PM EDT -----  Notify pt echo result: EF 30% and no thrombus seen  Dr Alonso recommends him to start entresto 24-46 mg BID. Check BMP and BNP in 2 weeks

## 2022-11-15 ENCOUNTER — OFFICE VISIT (OUTPATIENT)
Dept: INTERNAL MEDICINE | Facility: CLINIC | Age: 52
End: 2022-11-15

## 2022-11-15 VITALS
OXYGEN SATURATION: 95 % | HEART RATE: 77 BPM | SYSTOLIC BLOOD PRESSURE: 113 MMHG | WEIGHT: 291 LBS | HEIGHT: 69 IN | DIASTOLIC BLOOD PRESSURE: 77 MMHG | TEMPERATURE: 96.6 F | BODY MASS INDEX: 43.1 KG/M2

## 2022-11-15 DIAGNOSIS — I10 PRIMARY HYPERTENSION: ICD-10-CM

## 2022-11-15 DIAGNOSIS — R73.01 IMPAIRED FASTING GLUCOSE: Chronic | ICD-10-CM

## 2022-11-15 DIAGNOSIS — I50.22 CHRONIC SYSTOLIC CHF (CONGESTIVE HEART FAILURE): ICD-10-CM

## 2022-11-15 DIAGNOSIS — E78.5 HYPERLIPIDEMIA LDL GOAL <70: ICD-10-CM

## 2022-11-15 DIAGNOSIS — Z00.00 ANNUAL PHYSICAL EXAM: Primary | ICD-10-CM

## 2022-11-15 DIAGNOSIS — I48.0 PAROXYSMAL ATRIAL FIBRILLATION: ICD-10-CM

## 2022-11-15 DIAGNOSIS — Z23 NEED FOR INFLUENZA VACCINATION: ICD-10-CM

## 2022-11-15 LAB
ALBUMIN SERPL-MCNC: 4.2 G/DL (ref 3.5–5.2)
ALBUMIN/GLOB SERPL: 1.4 G/DL
ALP SERPL-CCNC: 56 U/L (ref 39–117)
ALT SERPL W P-5'-P-CCNC: 14 U/L (ref 1–41)
ANION GAP SERPL CALCULATED.3IONS-SCNC: 10 MMOL/L (ref 5–15)
AST SERPL-CCNC: 16 U/L (ref 1–40)
BILIRUB SERPL-MCNC: 0.5 MG/DL (ref 0–1.2)
BUN SERPL-MCNC: 12 MG/DL (ref 6–20)
BUN/CREAT SERPL: 9.9 (ref 7–25)
CALCIUM SPEC-SCNC: 9.5 MG/DL (ref 8.6–10.5)
CHLORIDE SERPL-SCNC: 102 MMOL/L (ref 98–107)
CHOLEST SERPL-MCNC: 184 MG/DL (ref 0–200)
CO2 SERPL-SCNC: 25 MMOL/L (ref 22–29)
CREAT SERPL-MCNC: 1.21 MG/DL (ref 0.76–1.27)
EGFRCR SERPLBLD CKD-EPI 2021: 72.9 ML/MIN/1.73
GLOBULIN UR ELPH-MCNC: 2.9 GM/DL
GLUCOSE SERPL-MCNC: 88 MG/DL (ref 65–99)
HDLC SERPL-MCNC: 25 MG/DL (ref 40–60)
LDLC SERPL CALC-MCNC: 109 MG/DL (ref 0–100)
LDLC/HDLC SERPL: 4.05 {RATIO}
NT-PROBNP SERPL-MCNC: 447 PG/ML (ref 0–900)
POTASSIUM SERPL-SCNC: 4.1 MMOL/L (ref 3.5–5.2)
PROT SERPL-MCNC: 7.1 G/DL (ref 6–8.5)
SODIUM SERPL-SCNC: 137 MMOL/L (ref 136–145)
TRIGL SERPL-MCNC: 289 MG/DL (ref 0–150)
VLDLC SERPL-MCNC: 50 MG/DL (ref 5–40)

## 2022-11-15 PROCEDURE — 80053 COMPREHEN METABOLIC PANEL: CPT | Performed by: INTERNAL MEDICINE

## 2022-11-15 PROCEDURE — 80061 LIPID PANEL: CPT | Performed by: INTERNAL MEDICINE

## 2022-11-15 PROCEDURE — 83880 ASSAY OF NATRIURETIC PEPTIDE: CPT | Performed by: INTERNAL MEDICINE

## 2022-11-15 PROCEDURE — 99214 OFFICE O/P EST MOD 30 MIN: CPT | Performed by: INTERNAL MEDICINE

## 2022-11-15 PROCEDURE — 83036 HEMOGLOBIN GLYCOSYLATED A1C: CPT | Performed by: INTERNAL MEDICINE

## 2022-11-15 PROCEDURE — 85025 COMPLETE CBC W/AUTO DIFF WBC: CPT | Performed by: INTERNAL MEDICINE

## 2022-11-15 NOTE — PROGRESS NOTES
"Chief Complaint  Diabetes (4mo follow up) and Hypertension    Subjective          Yefri Mcgee presents to [facility]  History of Present Illness  hypertension- patient denies Has, dizziness, chest pain. Patient reports home readings 120s/80s.   DM2- patient has lost 5 lbs since last appointment. Patient reports eating garden vegetable.   hyperlipidemia- doing well on statin.   hyperlipidemia- patient is on crestor and zetia.   CHF- patient is on entresto and coreg. Patient denies shortness of breath, dyspnea on exertion. Patient reports having medtronic device implanted. He bond snot think this has bee ninvestigated in a while.     Current Outpatient Medications   Medication Instructions   • amLODIPine (NORVASC) 10 mg, Oral, Daily   • aspirin 81 mg, Oral, Daily   • carvedilol (COREG) 25 mg, Oral, 2 Times Daily With Meals   • clopidogrel (PLAVIX) 75 mg, Oral, Daily   • ezetimibe (ZETIA) 10 mg, Oral, Daily   • furosemide (LASIX) 40 mg, Oral, 2 Times Daily   • rivaroxaban (XARELTO) 20 mg, Oral, Daily   • rosuvastatin (CRESTOR) 20 mg, Oral, Daily   • sacubitril-valsartan (Entresto) 24-26 MG tablet 1 tablet, Oral, 2 Times Daily       The following portions of the patient's history were reviewed and updated as appropriate: allergies, current medications, past family history, past medical history, past social history, past surgical history, and problem list.    Objective   Vital Signs:   /77   Pulse 77   Temp 96.6 °F (35.9 °C)   Ht 175.3 cm (69\")   Wt 132 kg (291 lb)   SpO2 95%   BMI 42.97 kg/m²     Wt Readings from Last 3 Encounters:   11/15/22 132 kg (291 lb)   10/11/22 134 kg (296 lb)   08/25/22 134 kg (296 lb)     BP Readings from Last 3 Encounters:   11/15/22 113/77   10/11/22 120/81   08/25/22 120/81     Physical Exam   Appearance: No acute distress, well-nourished  Head: normocephalic, atraumatic  Eyes: extraocular movements intact, no scleral icterus, " no conjunctival injection  Ears, Nose, and Throat: external ears normal, nares patent, moist mucous membranes  Cardiovascular: regular rate and rhythm. no murmurs, rubs, or gallops. no edema  Respiratory: breathing comfortably, symmetric chest rise, clear to auscultation bilaterally. No wheezes, rales, or rhonchi.  Neuro: alert and oriented to time, place, and person. Normal gait  Psych: normal mood and affect     Result Review :   The following data was reviewed by: Len Nichols Jr, MD on 11/15/2022:  Common labs    Common Labs 4/11/22 4/11/22 4/11/22 4/11/22 4/11/22    1341 1341 1341 1341 1341   Glucose  103 (A)      BUN  14      Creatinine  1.25      Sodium  138      Potassium  4.1      Chloride  103      Calcium  9.6      Albumin  4.50      Total Bilirubin  0.5      Alkaline Phosphatase  65      AST (SGOT)  26      ALT (SGPT)  27      WBC 8.14       Hemoglobin 16.9       Hematocrit 49.4       Platelets 305       Total Cholesterol   184     Triglycerides   376 (A)     HDL Cholesterol   23 (A)     LDL Cholesterol    98     Hemoglobin A1C    6.30 (A)    Microalbumin, Urine     5.0   (A) Abnormal value              Lab Results   Component Value Date    INR 0.95 (L) 07/28/2021          Assessment and Plan    Diagnoses and all orders for this visit:    1. Annual physical exam (Primary)    2. Impaired fasting glucose  Comments:  check labs. cont montioring.   Orders:  -     Ambulatory Referral for Diabetic Eye Exam-Ophthalmology  -     Hemoglobin A1c    3. Paroxysmal atrial fibrillation  Comments:  rate controlled. cont xarelto    4. Hyperlipidemia LDL goal <70  Comments:  cont statin. check labs  Orders:  -     Comprehensive Metabolic Panel  -     Lipid Panel    5. Primary hypertension  Comments:  well controlled on current regimen  Orders:  -     CBC & Differential  -     Comprehensive Metabolic Panel    6. Need for influenza vaccination    7. Chronic systolic CHF  Comments:  ont BB, entresto. will f/u  with cardiology about cardiac device.   Overview:  07/28/2021 Heart Cath: Moderately reduced left ventricular systolic function with an estimated ejection fraction of 35%     Orders:  -     proBNP        There are no discontinued medications.     Follow Up   Return in about 4 years (around 11/15/2026) for Recheck.  Patient was given instructions and counseling regarding his condition or for health maintenance advice. Please see specific information pulled into the AVS if appropriate.       Len Nichols Jr, MD  11/15/22  11:09 EST

## 2022-11-16 ENCOUNTER — TELEPHONE (OUTPATIENT)
Dept: INTERNAL MEDICINE | Facility: CLINIC | Age: 52
End: 2022-11-16

## 2022-11-16 LAB
BASOPHILS # BLD AUTO: 0.08 10*3/MM3 (ref 0–0.2)
BASOPHILS NFR BLD AUTO: 0.9 % (ref 0–1.5)
DEPRECATED RDW RBC AUTO: 41.7 FL (ref 37–54)
EOSINOPHIL # BLD AUTO: 0.23 10*3/MM3 (ref 0–0.4)
EOSINOPHIL NFR BLD AUTO: 2.7 % (ref 0.3–6.2)
ERYTHROCYTE [DISTWIDTH] IN BLOOD BY AUTOMATED COUNT: 13.6 % (ref 12.3–15.4)
HBA1C MFR BLD: 6 % (ref 4.8–5.6)
HCT VFR BLD AUTO: 48.7 % (ref 37.5–51)
HGB BLD-MCNC: 16.5 G/DL (ref 13–17.7)
IMM GRANULOCYTES # BLD AUTO: 0.02 10*3/MM3 (ref 0–0.05)
IMM GRANULOCYTES NFR BLD AUTO: 0.2 % (ref 0–0.5)
LYMPHOCYTES # BLD AUTO: 2.04 10*3/MM3 (ref 0.7–3.1)
LYMPHOCYTES NFR BLD AUTO: 23.7 % (ref 19.6–45.3)
MCH RBC QN AUTO: 29 PG (ref 26.6–33)
MCHC RBC AUTO-ENTMCNC: 33.9 G/DL (ref 31.5–35.7)
MCV RBC AUTO: 85.7 FL (ref 79–97)
MONOCYTES # BLD AUTO: 0.97 10*3/MM3 (ref 0.1–0.9)
MONOCYTES NFR BLD AUTO: 11.3 % (ref 5–12)
NEUTROPHILS NFR BLD AUTO: 5.26 10*3/MM3 (ref 1.7–7)
NEUTROPHILS NFR BLD AUTO: 61.2 % (ref 42.7–76)
NRBC BLD AUTO-RTO: 0 /100 WBC (ref 0–0.2)
PLATELET # BLD AUTO: 291 10*3/MM3 (ref 140–450)
PMV BLD AUTO: 10.9 FL (ref 6–12)
RBC # BLD AUTO: 5.68 10*6/MM3 (ref 4.14–5.8)
WBC NRBC COR # BLD: 8.6 10*3/MM3 (ref 3.4–10.8)

## 2022-11-16 RX ORDER — ROSUVASTATIN CALCIUM 40 MG/1
40 TABLET, COATED ORAL DAILY
Qty: 90 TABLET | Refills: 3 | Status: SHIPPED | OUTPATIENT
Start: 2022-11-16

## 2022-11-16 NOTE — TELEPHONE ENCOUNTER
PATIENT CALLED BACK THROUGH HUB. PATIENT VERIFIED . PATIENT IS AWARE OF LAB RESULTS. iS AWARE OF CRESTOR BEING SENT TO [place]. PT VOICED UNDERSTANDING, NO FURTHER QUESTIONS.

## 2022-11-16 NOTE — TELEPHONE ENCOUNTER
----- Message from Len Nichols Jr., MD sent at 11/16/2022  1:38 PM EST -----  HgbA1c at goal. Keep up the good work!    LDL is elevated - this has been associated with increased risk of cardiovascular disease including heart attacks and strokes. Would recommend low cholesterol diet to reduce. Would also recommend increase rosuvastatin to 40mg daily.     Elevated triglycerides, which are the storage form of sugar - recommend lower carbohydrate/sugar intake.     HDL low - this is protective cholesterol and generally like the number to be >50. encourage exercise to increase level.

## 2023-02-01 ENCOUNTER — CLINICAL SUPPORT NO REQUIREMENTS (OUTPATIENT)
Dept: CARDIOLOGY | Facility: CLINIC | Age: 53
End: 2023-02-01
Payer: OTHER GOVERNMENT

## 2023-02-01 ENCOUNTER — OFFICE VISIT (OUTPATIENT)
Dept: CARDIOLOGY | Facility: CLINIC | Age: 53
End: 2023-02-01
Payer: OTHER GOVERNMENT

## 2023-02-01 VITALS
HEART RATE: 77 BPM | SYSTOLIC BLOOD PRESSURE: 133 MMHG | DIASTOLIC BLOOD PRESSURE: 91 MMHG | HEIGHT: 69 IN | WEIGHT: 294 LBS | BODY MASS INDEX: 43.55 KG/M2

## 2023-02-01 DIAGNOSIS — I10 PRIMARY HYPERTENSION: ICD-10-CM

## 2023-02-01 DIAGNOSIS — I50.22 CHRONIC SYSTOLIC CHF (CONGESTIVE HEART FAILURE): Primary | ICD-10-CM

## 2023-02-01 DIAGNOSIS — Z95.5 S/P CORONARY ARTERY STENT PLACEMENT: ICD-10-CM

## 2023-02-01 DIAGNOSIS — I21.02 ACUTE ST ELEVATION MYOCARDIAL INFARCTION (STEMI) DUE TO OCCLUSION OF MID PORTION OF LEFT ANTERIOR DESCENDING (LAD) CORONARY ARTERY: ICD-10-CM

## 2023-02-01 DIAGNOSIS — I48.0 PAROXYSMAL ATRIAL FIBRILLATION: ICD-10-CM

## 2023-02-01 PROCEDURE — 99214 OFFICE O/P EST MOD 30 MIN: CPT | Performed by: INTERNAL MEDICINE

## 2023-02-01 NOTE — ASSESSMENT & PLAN NOTE
Patient with persistently decreased ejection fraction at the 30% range recommend the addition of Jardiance 10 mg once a day.  He appears to be doing clinically stable and stable from a volume standpoint do feel that he is on fairly good guideline directed medical therapy and has a persistently decreased ejection fraction indicating benefit from ICD implantation.  Discussed both options today with him of transvenous versus subcutaneous and patient is to get screened for subcutaneous today.  Also gave him a handout regarding the risk and benefits we will have him come back in a month to discuss further

## 2023-02-01 NOTE — ASSESSMENT & PLAN NOTE
LDL still not at goal he is on high-dose Crestor 40 daily and Zetia 10 once a day discussed with him the option of Repatha in place of Zetia since he was already adding the Jardiance he wanted to hold off on further medication changes this visit we will discuss again on follow-up in a month

## 2023-02-01 NOTE — PROGRESS NOTES
Patient was seen today by Dr. Alonso to discuss having an ICD implanted.  I was requested to do a screening for an S-ICD.

## 2023-02-01 NOTE — PROGRESS NOTES
Chief Complaint  Congestive Heart Failure, Follow-up, Hypertension, and Hyperlipidemia    Subjective    Patient is doing well not been having any anginal chest discomfort.  He has stable shortness of breath symptoms his weight is up mildly at 3 pounds.  Chronic mild lower extremity edema    Past Medical History:   Diagnosis Date   • Broken bones    • Chronic kidney disease    • Chronic systolic CHF 8/9/2021 07/28/2021 Heart Cath: Moderately reduced left ventricular systolic function with an estimated ejection fraction of 35%    • Head injury    • Hyperlipidemia    • Hypertension    • Paroxysmal atrial fibrillation 01/15/2019   • PFO (patent foramen ovale) 06/2014   • STEMI involving left anterior descending coronary artery 07/2021    also previous PCI in 2014   • Stroke          Current Outpatient Medications:   •  amLODIPine (NORVASC) 10 MG tablet, Take 10 mg by mouth Daily., Disp: , Rfl:   •  aspirin 81 MG chewable tablet, Chew 81 mg Daily., Disp: , Rfl:   •  carvedilol (COREG) 25 MG tablet, Take 1 tablet by mouth 2 (Two) Times a Day With Meals., Disp: 180 tablet, Rfl: 3  •  clopidogrel (PLAVIX) 75 MG tablet, Take 1 tablet by mouth Daily., Disp: 90 tablet, Rfl: 3  •  furosemide (LASIX) 40 MG tablet, Take 1 tablet by mouth 2 (Two) Times a Day., Disp: 180 tablet, Rfl: 3  •  rosuvastatin (CRESTOR) 40 MG tablet, Take 1 tablet by mouth Daily., Disp: 90 tablet, Rfl: 3  •  sacubitril-valsartan (Entresto) 24-26 MG tablet, Take 1 tablet by mouth 2 (Two) Times a Day., Disp: 180 tablet, Rfl: 3  •  ezetimibe (ZETIA) 10 MG tablet, Take 1 tablet by mouth Daily for 90 days., Disp: 90 tablet, Rfl: 3  •  rivaroxaban (XARELTO) 20 MG tablet, Take 1 tablet by mouth Daily for 90 days., Disp: 90 tablet, Rfl: 3    There are no discontinued medications.  Allergies   Allergen Reactions   • Eliquis [Apixaban] Paresthesia        Social History     Tobacco Use   • Smoking status: Every Day     Packs/day: 1.00     Years: 35.00     Pack  "years: 35.00     Types: Cigarettes     Start date: 1985   • Smokeless tobacco: Never   • Tobacco comments:     CURRENT EVERYDAY SMOKER, SMOKED 31 OR MORE YEARS     Vaping Use   • Vaping Use: Former   • Substances: Nicotine, Flavoring   • Devices: Refillable tank   Substance Use Topics   • Alcohol use: Yes     Comment: DRINKS monthly, LESS THAN 1 DRINK PER DAY, HAS BEEN DRINKING FOR 31 OR MORE YEARS   • Drug use: Never       Family History   Problem Relation Age of Onset   • Cancer Mother    • Heart disease Father    • Diabetes Father         Objective     /91   Pulse 77   Ht 175.3 cm (69\")   Wt 133 kg (294 lb)   BMI 43.42 kg/m²       Physical Exam    General Appearance:   · no acute distress  · Alert and oriented x3  HENT:   · lips not cyanotic  · Atraumatic  Neck:  · No jvd   · supple  Respiratory:  · no respiratory distress  · normal breath sounds  · no rales  Cardiovascular:  · Regular rate and rhythm  · no S3, no S4   · no murmur  · no rub  Extremities  · No cyanosis  · lower extremity edema: none    Skin:   · warm, dry  · No rashes      Result Review :     proBNP   Date Value Ref Range Status   11/15/2022 447.0 0.0 - 900.0 pg/mL Final     CMP    CMP 4/11/22 11/15/22   Glucose 103 (A) 88   BUN 14 12   Creatinine 1.25 1.21   eGFR 70.2 72.9   Sodium 138 137   Potassium 4.1 4.1   Chloride 103 102   Calcium 9.6 9.5   Total Protein 7.6 7.1   Albumin 4.50 4.20   Globulin 3.1 2.9   Total Bilirubin 0.5 0.5   Alkaline Phosphatase 65 56   AST (SGOT) 26 16   ALT (SGPT) 27 14   Albumin/Globulin Ratio 1.5 1.4   BUN/Creatinine Ratio 11.2 9.9   Anion Gap 12.7 10.0   (A) Abnormal value       Comments are available for some flowsheets but are not being displayed.           CBC w/diff    CBC w/Diff 4/11/22 11/15/22   WBC 8.14 8.60   RBC 5.74 5.68   Hemoglobin 16.9 16.5   Hematocrit 49.4 48.7   MCV 86.1 85.7   MCH 29.4 29.0   MCHC 34.2 33.9   RDW 13.6 13.6   Platelets 305 291   Neutrophil Rel % 57.5 61.2   Immature " Granulocyte Rel % 0.2 0.2   Lymphocyte Rel % 27.5 23.7   Monocyte Rel % 11.8 11.3   Eosinophil Rel % 2.3 2.7   Basophil Rel % 0.7 0.9            Lab Results   Component Value Date    TSH 1.400 06/07/2021      No results found for: FREET4   No results found for: DDIMERQUANT  Magnesium   Date Value Ref Range Status   07/28/2021 1.8 1.6 - 2.6 mg/dL Final      No results found for: DIGOXIN   Lab Results   Component Value Date    TROPONINT <0.01 01/03/2019           Lipid Panel    Lipid Panel 4/11/22 11/15/22   Total Cholesterol 184 184   Triglycerides 376 (A) 289 (A)   HDL Cholesterol 23 (A) 25 (A)   VLDL Cholesterol 63 (A) 50 (A)   LDL Cholesterol  98 109 (A)   LDL/HDL Ratio 3.73 4.05   (A) Abnormal value            Lab Results   Component Value Date    POCTROP 0.04 07/28/2021       Results for orders placed in visit on 10/11/22    Adult Transthoracic Echo Complete W/ Cont if Necessary Per Protocol    Interpretation Summary  •  Estimated left ventricular EF = 30% Left ventricular systolic function is moderately decreased.  •  The left ventricular cavity is moderately dilated.  •  Left ventricular wall thickness is consistent with moderate concentric hypertrophy.  •  No obvious thrombus seen in the apex although it does appear to be more hypokinetic and there are some trabeculations of the myocardium which make it impossible to fully exclude                 Diagnoses and all orders for this visit:    1. Chronic systolic CHF (Primary)  Assessment & Plan:  Patient with persistently decreased ejection fraction at the 30% range recommend the addition of Jardiance 10 mg once a day.  He appears to be doing clinically stable and stable from a volume standpoint do feel that he is on fairly good guideline directed medical therapy and has a persistently decreased ejection fraction indicating benefit from ICD implantation.  Discussed both options today with him of transvenous versus subcutaneous and patient is to get screened for  subcutaneous today.  Also gave him a handout regarding the risk and benefits we will have him come back in a month to discuss further      2. S/P coronary artery stent placement  Assessment & Plan:  Patient is doing well no ongoing angina continue with chronic aspirin 81 mg daily        3. Paroxysmal atrial fibrillation  Assessment & Plan:  Maintaining normal sinus rhythm on Xarelto 20 daily for CVA prevention      4. Primary hypertension          Follow Up     Return in about 4 weeks (around 3/1/2023) for Follow with Adrienne Clemons.          Patient was given instructions and counseling regarding his condition or for health maintenance advice. Please see specific information pulled into the AVS if appropriate.

## 2023-03-08 ENCOUNTER — TELEPHONE (OUTPATIENT)
Dept: INTERNAL MEDICINE | Facility: CLINIC | Age: 53
End: 2023-03-08
Payer: OTHER GOVERNMENT

## 2023-03-08 RX ORDER — EZETIMIBE 10 MG/1
10 TABLET ORAL DAILY
Qty: 90 TABLET | Refills: 3 | Status: SHIPPED | OUTPATIENT
Start: 2023-03-08 | End: 2023-06-06

## 2023-03-08 RX ORDER — FUROSEMIDE 40 MG/1
40 TABLET ORAL 2 TIMES DAILY
Qty: 180 TABLET | Refills: 3 | Status: SHIPPED | OUTPATIENT
Start: 2023-03-08

## 2023-03-08 RX ORDER — ASPIRIN 81 MG/1
81 TABLET, CHEWABLE ORAL DAILY
Qty: 90 TABLET | Refills: 3 | Status: SHIPPED | OUTPATIENT
Start: 2023-03-08 | End: 2023-04-05 | Stop reason: HOSPADM

## 2023-03-08 RX ORDER — AMLODIPINE BESYLATE 10 MG/1
10 TABLET ORAL DAILY
Qty: 90 TABLET | Refills: 3 | Status: SHIPPED | OUTPATIENT
Start: 2023-03-08

## 2023-03-08 RX ORDER — CLOPIDOGREL BISULFATE 75 MG/1
75 TABLET ORAL DAILY
Qty: 90 TABLET | Refills: 3 | Status: ON HOLD | OUTPATIENT
Start: 2023-03-08 | End: 2023-04-05 | Stop reason: SDUPTHER

## 2023-03-08 RX ORDER — CARVEDILOL 25 MG/1
25 TABLET ORAL 2 TIMES DAILY WITH MEALS
Qty: 180 TABLET | Refills: 3 | Status: SHIPPED | OUTPATIENT
Start: 2023-03-08

## 2023-03-08 RX ORDER — SACUBITRIL AND VALSARTAN 24; 26 MG/1; MG/1
1 TABLET, FILM COATED ORAL 2 TIMES DAILY
Qty: 180 TABLET | Refills: 3 | Status: SHIPPED | OUTPATIENT
Start: 2023-03-08

## 2023-03-08 NOTE — TELEPHONE ENCOUNTER
"Caller: [facility]    Relationship: Emergency Contact    Best call back number: [phone]    Requested Prescriptions:   \"8 OF HIS MEDICATIONS THAT HE RENEWS EACH YEAR\"    Pharmacy where request should be sent: [facility] - [place], [place] - [address] - [phone]  - [phone] FX     Additional details provided by patient: PATIENT IS OUT OF REFILLS CURRENTLY.     Does the patient have less than a 3 day supply:  [x] Yes  [] No    Would you like a call back once the refill request has been completed: [x] Yes [] No    If the office needs to give you a call back, can they leave a voicemail: [x] Yes [] No    Nav Ruffin Rep   03/08/23 16:42 EST             "

## 2023-03-13 ENCOUNTER — TELEPHONE (OUTPATIENT)
Dept: INTERNAL MEDICINE | Facility: CLINIC | Age: 53
End: 2023-03-13
Payer: OTHER GOVERNMENT

## 2023-03-13 NOTE — TELEPHONE ENCOUNTER
OK FOR HUB TO RESCHEDULE!!!      LVM FOR PATIENT TO RESCHEDULE APPOINTMENT. DR. SCHWARZ OUT OF OFFICE UNTIL 5/1/23. MAY RESCHEDULE WITH PETRA BRIDGES (APRN WHO WILL BE FILLING IN FOR DR. SCHWARZ WHILE HE IS GONE)

## 2023-03-16 ENCOUNTER — TELEPHONE (OUTPATIENT)
Dept: INTERNAL MEDICINE | Facility: CLINIC | Age: 53
End: 2023-03-16
Payer: OTHER GOVERNMENT

## 2023-03-16 ENCOUNTER — CLINICAL SUPPORT (OUTPATIENT)
Dept: INTERNAL MEDICINE | Facility: CLINIC | Age: 53
End: 2023-03-16
Payer: OTHER GOVERNMENT

## 2023-03-16 DIAGNOSIS — I50.22 CHRONIC SYSTOLIC CHF (CONGESTIVE HEART FAILURE): ICD-10-CM

## 2023-03-16 PROCEDURE — 80048 BASIC METABOLIC PNL TOTAL CA: CPT | Performed by: NURSE PRACTITIONER

## 2023-03-16 PROCEDURE — 83880 ASSAY OF NATRIURETIC PEPTIDE: CPT | Performed by: NURSE PRACTITIONER

## 2023-03-16 PROCEDURE — 36415 COLL VENOUS BLD VENIPUNCTURE: CPT | Performed by: INTERNAL MEDICINE

## 2023-03-16 NOTE — TELEPHONE ENCOUNTER
LVM TO RESCHEDULE APPOINTMENT- REALIZED RESCHEDULED APPOINTMENT FOR APRN THAT WILL NOT BE IN OFFICE

## 2023-03-17 ENCOUNTER — TELEPHONE (OUTPATIENT)
Dept: CARDIOLOGY | Facility: CLINIC | Age: 53
End: 2023-03-17
Payer: OTHER GOVERNMENT

## 2023-03-17 LAB
ANION GAP SERPL CALCULATED.3IONS-SCNC: 12.6 MMOL/L (ref 5–15)
BUN SERPL-MCNC: 15 MG/DL (ref 6–20)
BUN/CREAT SERPL: 11.4 (ref 7–25)
CALCIUM SPEC-SCNC: 9.9 MG/DL (ref 8.6–10.5)
CHLORIDE SERPL-SCNC: 104 MMOL/L (ref 98–107)
CO2 SERPL-SCNC: 24.4 MMOL/L (ref 22–29)
CREAT SERPL-MCNC: 1.32 MG/DL (ref 0.76–1.27)
EGFRCR SERPLBLD CKD-EPI 2021: 65.3 ML/MIN/1.73
GLUCOSE SERPL-MCNC: 143 MG/DL (ref 65–99)
NT-PROBNP SERPL-MCNC: 349 PG/ML (ref 0–900)
POTASSIUM SERPL-SCNC: 4 MMOL/L (ref 3.5–5.2)
SODIUM SERPL-SCNC: 141 MMOL/L (ref 136–145)

## 2023-03-17 NOTE — TELEPHONE ENCOUNTER
----- Message from ROYCE Browne sent at 3/17/2023  2:22 AM EDT -----  Notify pt labs are good, continue current meds, follow up as scheduled

## 2023-03-21 ENCOUNTER — OFFICE VISIT (OUTPATIENT)
Dept: INTERNAL MEDICINE | Facility: CLINIC | Age: 53
End: 2023-03-21
Payer: OTHER GOVERNMENT

## 2023-03-21 ENCOUNTER — TELEPHONE (OUTPATIENT)
Dept: CARDIOLOGY | Facility: CLINIC | Age: 53
End: 2023-03-21
Payer: OTHER GOVERNMENT

## 2023-03-21 VITALS
TEMPERATURE: 98.4 F | DIASTOLIC BLOOD PRESSURE: 77 MMHG | WEIGHT: 294.8 LBS | HEART RATE: 75 BPM | HEIGHT: 69 IN | BODY MASS INDEX: 43.66 KG/M2 | OXYGEN SATURATION: 96 % | SYSTOLIC BLOOD PRESSURE: 112 MMHG

## 2023-03-21 DIAGNOSIS — I10 PRIMARY HYPERTENSION: Primary | ICD-10-CM

## 2023-03-21 DIAGNOSIS — R42 VERTIGO: ICD-10-CM

## 2023-03-21 DIAGNOSIS — R73.01 IMPAIRED FASTING GLUCOSE: ICD-10-CM

## 2023-03-23 ENCOUNTER — PREP FOR SURGERY (OUTPATIENT)
Dept: OTHER | Facility: HOSPITAL | Age: 53
End: 2023-03-23
Payer: OTHER GOVERNMENT

## 2023-03-23 ENCOUNTER — OFFICE VISIT (OUTPATIENT)
Dept: CARDIOLOGY | Facility: CLINIC | Age: 53
End: 2023-03-23
Payer: OTHER GOVERNMENT

## 2023-03-23 VITALS
HEART RATE: 73 BPM | DIASTOLIC BLOOD PRESSURE: 82 MMHG | SYSTOLIC BLOOD PRESSURE: 118 MMHG | WEIGHT: 295 LBS | BODY MASS INDEX: 43.69 KG/M2 | HEIGHT: 69 IN

## 2023-03-23 DIAGNOSIS — I50.22 CHRONIC SYSTOLIC CHF (CONGESTIVE HEART FAILURE): Primary | ICD-10-CM

## 2023-03-23 DIAGNOSIS — I50.22 CHRONIC HFREF (HEART FAILURE WITH REDUCED EJECTION FRACTION): Primary | ICD-10-CM

## 2023-03-23 PROBLEM — I21.02: Status: RESOLVED | Noted: 2021-07-28 | Resolved: 2023-03-23

## 2023-03-23 PROCEDURE — 99213 OFFICE O/P EST LOW 20 MIN: CPT | Performed by: NURSE PRACTITIONER

## 2023-03-23 RX ORDER — SODIUM CHLORIDE 0.9 % (FLUSH) 0.9 %
10 SYRINGE (ML) INJECTION EVERY 12 HOURS SCHEDULED
Status: CANCELLED | OUTPATIENT
Start: 2023-03-23

## 2023-03-23 RX ORDER — SODIUM CHLORIDE 9 MG/ML
40 INJECTION, SOLUTION INTRAVENOUS AS NEEDED
Status: CANCELLED | OUTPATIENT
Start: 2023-03-23

## 2023-03-23 RX ORDER — SODIUM CHLORIDE 0.9 % (FLUSH) 0.9 %
10 SYRINGE (ML) INJECTION AS NEEDED
Status: CANCELLED | OUTPATIENT
Start: 2023-03-23

## 2023-03-23 NOTE — PROGRESS NOTES
Chief Complaint  Follow-up, Hyperlipidemia, and Hypertension    Subjective            History of Present Illness  Yefri Mcgee is a 52-year-old white/ male patient who presents to the office today for follow-up.  He has chronic HFrEF with persistently decreased ejection fraction even on guideline directed medical therapy.  He was screened for subcutaneous ICD and has been deemed a candidate for this procedure.  He also has hypertension, paroxysmal atrial fibrillation, and hyperlipidemia.  He reports compliance with his medications.    [facility]  Past Medical History:   Diagnosis Date   • Broken bones    • Chronic kidney disease    • Chronic systolic CHF 8/9/2021 07/28/2021 Heart Cath: Moderately reduced left ventricular systolic function with an estimated ejection fraction of 35%    • Head injury    • History of (STEMI) due to occlusion of mid portion of left anterior descending (LAD) coronary artery 7/28/2021   • Hyperlipidemia    • Hypertension    • Paroxysmal atrial fibrillation 01/15/2019   • PFO (patent foramen ovale) 06/2014   • STEMI involving left anterior descending coronary artery 07/2021    also previous PCI in 2014   • Stroke          ALLERGY  Allergies   Allergen Reactions   • Eliquis [Apixaban] Paresthesia          SURGICALHX  Past Surgical History:   Procedure Laterality Date   • CARDIAC CATHETERIZATION N/A 7/28/2021    Procedure: Left Heart Cath;  Surgeon: Enrique Jack MD;  Location: [facility] CATH INVASIVE LOCATION;  Service: Cardiovascular;  Laterality: N/A;   • CARDIAC SURGERY      HEART BYPASS    • CAROTID STENT            SOC  Social History     Socioeconomic History   • Marital status:    Tobacco Use   • Smoking status: Every Day     Packs/day: 1.00     Years: 35.00     Pack years: 35.00     Types: Cigarettes     Start date: 1985   • Smokeless tobacco: Never   • Tobacco comments:     CURRENT EVERYDAY SMOKER, SMOKED 31 OR MORE YEARS     Vaping Use   • Vaping Use: Former   •  "Substances: Nicotine, Flavoring   • Devices: Mobile Adsble tank   Substance and Sexual Activity   • Alcohol use: Yes     Comment: DRINKS monthly, LESS THAN 1 DRINK PER DAY, HAS BEEN DRINKING FOR 31 OR MORE YEARS   • Drug use: Never   • Sexual activity: Defer         FAMHX  Family History   Problem Relation Age of Onset   • Cancer Mother    • Heart disease Father    • Diabetes Father           MEDSIGONLY  Current Outpatient Medications on File Prior to Visit   Medication Sig   • amLODIPine (NORVASC) 10 MG tablet Take 1 tablet by mouth Daily.   • aspirin 81 MG chewable tablet Chew 1 tablet Daily.   • carvedilol (COREG) 25 MG tablet Take 1 tablet by mouth 2 (Two) Times a Day With Meals.   • clopidogrel (PLAVIX) 75 MG tablet Take 1 tablet by mouth Daily.   • empagliflozin (JARDIANCE) 10 MG tablet tablet Take 1 tablet by mouth Daily.   • ezetimibe (ZETIA) 10 MG tablet Take 1 tablet by mouth Daily for 90 days.   • furosemide (LASIX) 40 MG tablet Take 1 tablet by mouth 2 (Two) Times a Day.   • rivaroxaban (XARELTO) 20 MG tablet Take 1 tablet by mouth Daily for 90 days.   • rosuvastatin (CRESTOR) 40 MG tablet Take 1 tablet by mouth Daily.   • sacubitril-valsartan (Entresto) 24-26 MG tablet Take 1 tablet by mouth 2 (Two) Times a Day.     No current facility-administered medications on file prior to visit.         Objective   /82   Pulse 73   Ht 175.3 cm (69\")   Wt 134 kg (295 lb)   BMI 43.56 kg/m²       Physical Exam  Constitutional:       Appearance: He is obese.   HENT:      Head: Normocephalic.   Neck:      Vascular: No carotid bruit.   Cardiovascular:      Rate and Rhythm: Normal rate and regular rhythm.      Pulses: Normal pulses.      Heart sounds: Normal heart sounds. No murmur heard.  Pulmonary:      Effort: Pulmonary effort is normal.      Breath sounds: Normal breath sounds.   Musculoskeletal:      Cervical back: Neck supple.      Right lower leg: No edema.      Left lower leg: No edema.   Skin:     General: " Skin is dry.   Neurological:      Mental Status: He is alert and oriented to person, place, and time.   Psychiatric:         Behavior: Behavior normal.       Result Review :   The following data was reviewed by: ROYCE Frankel on 03/23/2023:  proBNP   Date Value Ref Range Status   03/16/2023 349.0 0.0 - 900.0 pg/mL Final     CMP    CMP 3/16/23   Glucose 143 (A)   BUN 15   Creatinine 1.32 (A)   eGFR 65.3   Sodium 141   Potassium 4.0   Chloride 104   Calcium 9.9   Total Protein    Albumin    Globulin    Total Bilirubin    Alkaline Phosphatase    AST (SGOT)    ALT (SGPT)    Albumin/Globulin Ratio    BUN/Creatinine Ratio 11.4   Anion Gap 12.6   (A) Abnormal value       Comments are available for some flowsheets but are not being displayed.           CBC w/diff    CBC w/Diff 11/15/22   WBC 8.60   RBC 5.68   Hemoglobin 16.5   Hematocrit 48.7   MCV 85.7   MCH 29.0   MCHC 33.9   RDW 13.6   Platelets 291   Neutrophil Rel % 61.2   Immature Granulocyte Rel % 0.2   Lymphocyte Rel % 23.7   Monocyte Rel % 11.3   Eosinophil Rel % 2.7   Basophil Rel % 0.9            Lab Results   Component Value Date    TSH 1.400 06/07/2021      No results found for: FREET4   No results found for: DDIMERQUANT  Magnesium   Date Value Ref Range Status   07/28/2021 1.8 1.6 - 2.6 mg/dL Final      No results found for: DIGOXIN   Lab Results   Component Value Date    TROPONINT <0.01 01/03/2019           Lipid Panel    Lipid Panel 11/15/22   Total Cholesterol 184   Triglycerides 289 (A)   HDL Cholesterol 25 (A)   VLDL Cholesterol 50 (A)   LDL Cholesterol  109 (A)   LDL/HDL Ratio 4.05   (A) Abnormal value            Results for orders placed in visit on 10/11/22    Adult Transthoracic Echo Complete W/ Cont if Necessary Per Protocol    Interpretation Summary  •  Estimated left ventricular EF = 30% Left ventricular systolic function is moderately decreased.  •  The left ventricular cavity is moderately dilated.  •  Left ventricular wall thickness is  consistent with moderate concentric hypertrophy.  •  No obvious thrombus seen in the apex although it does appear to be more hypokinetic and there are some trabeculations of the myocardium which make it impossible to fully exclude    [id number] Score: 5        Assessment and Plan    Diagnoses and all orders for this visit:    1. Chronic systolic CHF (Primary)  He is agreeable to subcutaneous ICD implantation.  Order will be placed for procedure once that date and time have been set with general anesthesia.  Continue Entresto 24-26 mg twice daily, Lasix 40 mg twice daily, Jardiance 10 mg daily, and carvedilol 25 mg twice daily.  Risks and benefits of procedure have been discussed.  All questions answered at this time.  Patient verbalizes understanding and is agreeable with proceeding.          Follow Up   Return in about 6 months (around 9/23/2023) for Follow up with Dr Alonso.    Patient was given instructions and counseling regarding his condition or for health maintenance advice. Please see specific information pulled into the AVS if appropriate.     Yefri Mcgee  reports that he has been smoking cigarettes. He started smoking about 38 years ago. He has a 35.00 pack-year smoking history. He has never used smokeless tobacco.. I have educated him on the risk of diseases from using tobacco products such as cancer, COPD and heart disease.     I advised him to quit and he is not willing to quit.    I spent 4 minutes counseling the patient.           ROYCE Frankel  03/23/23  13:03 EDT    Dictated Utilizing Dragon Dictation

## 2023-03-23 NOTE — H&P (VIEW-ONLY)
Chief Complaint  Follow-up, Hyperlipidemia, and Hypertension    Subjective            History of Present Illness  Yefri Mcgee is a 52-year-old white/ male patient who presents to the office today for follow-up.  He has chronic HFrEF with persistently decreased ejection fraction even on guideline directed medical therapy.  He was screened for subcutaneous ICD and has been deemed a candidate for this procedure.  He also has hypertension, paroxysmal atrial fibrillation, and hyperlipidemia.  He reports compliance with his medications.    [facility]  Past Medical History:   Diagnosis Date   • Broken bones    • Chronic kidney disease    • Chronic systolic CHF 8/9/2021 07/28/2021 Heart Cath: Moderately reduced left ventricular systolic function with an estimated ejection fraction of 35%    • Head injury    • History of (STEMI) due to occlusion of mid portion of left anterior descending (LAD) coronary artery 7/28/2021   • Hyperlipidemia    • Hypertension    • Paroxysmal atrial fibrillation 01/15/2019   • PFO (patent foramen ovale) 06/2014   • STEMI involving left anterior descending coronary artery 07/2021    also previous PCI in 2014   • Stroke          ALLERGY  Allergies   Allergen Reactions   • Eliquis [Apixaban] Paresthesia          SURGICALHX  Past Surgical History:   Procedure Laterality Date   • CARDIAC CATHETERIZATION N/A 7/28/2021    Procedure: Left Heart Cath;  Surgeon: Enrique Jack MD;  Location: [facility] CATH INVASIVE LOCATION;  Service: Cardiovascular;  Laterality: N/A;   • CARDIAC SURGERY      HEART BYPASS    • CAROTID STENT            SOC  Social History     Socioeconomic History   • Marital status:    Tobacco Use   • Smoking status: Every Day     Packs/day: 1.00     Years: 35.00     Pack years: 35.00     Types: Cigarettes     Start date: 1985   • Smokeless tobacco: Never   • Tobacco comments:     CURRENT EVERYDAY SMOKER, SMOKED 31 OR MORE YEARS     Vaping Use   • Vaping Use: Former   •  "Substances: Nicotine, Flavoring   • Devices: Rewarderble tank   Substance and Sexual Activity   • Alcohol use: Yes     Comment: DRINKS monthly, LESS THAN 1 DRINK PER DAY, HAS BEEN DRINKING FOR 31 OR MORE YEARS   • Drug use: Never   • Sexual activity: Defer         FAMHX  Family History   Problem Relation Age of Onset   • Cancer Mother    • Heart disease Father    • Diabetes Father           MEDSIGONLY  Current Outpatient Medications on File Prior to Visit   Medication Sig   • amLODIPine (NORVASC) 10 MG tablet Take 1 tablet by mouth Daily.   • aspirin 81 MG chewable tablet Chew 1 tablet Daily.   • carvedilol (COREG) 25 MG tablet Take 1 tablet by mouth 2 (Two) Times a Day With Meals.   • clopidogrel (PLAVIX) 75 MG tablet Take 1 tablet by mouth Daily.   • empagliflozin (JARDIANCE) 10 MG tablet tablet Take 1 tablet by mouth Daily.   • ezetimibe (ZETIA) 10 MG tablet Take 1 tablet by mouth Daily for 90 days.   • furosemide (LASIX) 40 MG tablet Take 1 tablet by mouth 2 (Two) Times a Day.   • rivaroxaban (XARELTO) 20 MG tablet Take 1 tablet by mouth Daily for 90 days.   • rosuvastatin (CRESTOR) 40 MG tablet Take 1 tablet by mouth Daily.   • sacubitril-valsartan (Entresto) 24-26 MG tablet Take 1 tablet by mouth 2 (Two) Times a Day.     No current facility-administered medications on file prior to visit.         Objective   /82   Pulse 73   Ht 175.3 cm (69\")   Wt 134 kg (295 lb)   BMI 43.56 kg/m²       Physical Exam  Constitutional:       Appearance: He is obese.   HENT:      Head: Normocephalic.   Neck:      Vascular: No carotid bruit.   Cardiovascular:      Rate and Rhythm: Normal rate and regular rhythm.      Pulses: Normal pulses.      Heart sounds: Normal heart sounds. No murmur heard.  Pulmonary:      Effort: Pulmonary effort is normal.      Breath sounds: Normal breath sounds.   Musculoskeletal:      Cervical back: Neck supple.      Right lower leg: No edema.      Left lower leg: No edema.   Skin:     General: " Skin is dry.   Neurological:      Mental Status: He is alert and oriented to person, place, and time.   Psychiatric:         Behavior: Behavior normal.       Result Review :   The following data was reviewed by: ROYCE Frankel on 03/23/2023:  proBNP   Date Value Ref Range Status   03/16/2023 349.0 0.0 - 900.0 pg/mL Final     CMP    CMP 3/16/23   Glucose 143 (A)   BUN 15   Creatinine 1.32 (A)   eGFR 65.3   Sodium 141   Potassium 4.0   Chloride 104   Calcium 9.9   Total Protein    Albumin    Globulin    Total Bilirubin    Alkaline Phosphatase    AST (SGOT)    ALT (SGPT)    Albumin/Globulin Ratio    BUN/Creatinine Ratio 11.4   Anion Gap 12.6   (A) Abnormal value       Comments are available for some flowsheets but are not being displayed.           CBC w/diff    CBC w/Diff 11/15/22   WBC 8.60   RBC 5.68   Hemoglobin 16.5   Hematocrit 48.7   MCV 85.7   MCH 29.0   MCHC 33.9   RDW 13.6   Platelets 291   Neutrophil Rel % 61.2   Immature Granulocyte Rel % 0.2   Lymphocyte Rel % 23.7   Monocyte Rel % 11.3   Eosinophil Rel % 2.7   Basophil Rel % 0.9            Lab Results   Component Value Date    TSH 1.400 06/07/2021      No results found for: FREET4   No results found for: DDIMERQUANT  Magnesium   Date Value Ref Range Status   07/28/2021 1.8 1.6 - 2.6 mg/dL Final      No results found for: DIGOXIN   Lab Results   Component Value Date    TROPONINT <0.01 01/03/2019           Lipid Panel    Lipid Panel 11/15/22   Total Cholesterol 184   Triglycerides 289 (A)   HDL Cholesterol 25 (A)   VLDL Cholesterol 50 (A)   LDL Cholesterol  109 (A)   LDL/HDL Ratio 4.05   (A) Abnormal value            Results for orders placed in visit on 10/11/22    Adult Transthoracic Echo Complete W/ Cont if Necessary Per Protocol    Interpretation Summary  •  Estimated left ventricular EF = 30% Left ventricular systolic function is moderately decreased.  •  The left ventricular cavity is moderately dilated.  •  Left ventricular wall thickness is  consistent with moderate concentric hypertrophy.  •  No obvious thrombus seen in the apex although it does appear to be more hypokinetic and there are some trabeculations of the myocardium which make it impossible to fully exclude    [id number] Score: 5        Assessment and Plan    Diagnoses and all orders for this visit:    1. Chronic systolic CHF (Primary)  He is agreeable to subcutaneous ICD implantation.  Order will be placed for procedure once that date and time have been set with general anesthesia.  Continue Entresto 24-26 mg twice daily, Lasix 40 mg twice daily, Jardiance 10 mg daily, and carvedilol 25 mg twice daily.  Risks and benefits of procedure have been discussed.  All questions answered at this time.  Patient verbalizes understanding and is agreeable with proceeding.          Follow Up   Return in about 6 months (around 9/23/2023) for Follow up with Dr Alonso.    Patient was given instructions and counseling regarding his condition or for health maintenance advice. Please see specific information pulled into the AVS if appropriate.     Yefri Mcgee  reports that he has been smoking cigarettes. He started smoking about 38 years ago. He has a 35.00 pack-year smoking history. He has never used smokeless tobacco.. I have educated him on the risk of diseases from using tobacco products such as cancer, COPD and heart disease.     I advised him to quit and he is not willing to quit.    I spent 4 minutes counseling the patient.           ROYCE Frankel  03/23/23  13:03 EDT    Dictated Utilizing Dragon Dictation

## 2023-03-24 ENCOUNTER — TELEPHONE (OUTPATIENT)
Dept: CARDIOLOGY | Facility: CLINIC | Age: 53
End: 2023-03-24
Payer: OTHER GOVERNMENT

## 2023-03-24 NOTE — TELEPHONE ENCOUNTER
I spoke to patient and gave an arrival time of 8:00 on 04/04/23 for new SQ ICD implant. Patient was instgructed to hold Xarelto for 48 hours prior to procedure and to be NPO after midnight.

## 2023-03-30 NOTE — TELEPHONE ENCOUNTER
Procedure moved to later in the day to accommodate anesthesia. Patient aware of new arrival time of 12:00 noon.

## 2023-03-31 NOTE — NURSING NOTE
PT STATES HE WAS INSTRUCTED TO HOLD XARELTO WITH LAST DOSE 4/1/23 A.M. BUT WAS NOT TOLD TO HOLD PLAVIX. MESSAGE LEFT FOR JEFF MORA TO VERIFY THIS.  VERIFIED PER SALMA MORA LAST DOSE 3/31/23 AM, AWAITING FURTHER ORDERS.  1630 NO FURTHER ORDERS PER SALMA MORA RN/ISAÍAS

## 2023-03-31 NOTE — PRE-PROCEDURE INSTRUCTIONS
PATIENT INSTRUCTED TO BE:    - NPO AFTER MIDNIGHT EXCEPT CAN HAVE SIPS OF WATER WITH HIS MEDICATION PRIOR TO PROCEDURE    -  INSTRUCTED NO LOTIONS, JEWELRY, PIERCINGS, OR DEODORANT DAY OF THE PROCEDURE    - INSTRUCTED TO TAKE THE FOLLOWING MEDICATIONS THE DAY OF SURGERY:           - INSTRUCTED PT TO FOLLOW ANY INSTRUCTIONS GIVEN BY HIS CARDIOLOGIST.    - INFORMED PT THEY ATTEMPT RADIAL APPROACH FIRST IF UNABLE TO PERFORM CATH WITH THAT APPROACH THEY WILL DO A FEMORAL APPROACH.  ALSO, INFORMED PT THEY WILL BE ON BEDREST POSTOP.  IF THE SURGEON FEELS  AN INTERVENTION OR STENTS NEEDS TO BE PLACED, HE/SHE WILL STAY OVER NIGHT FOR OBSERVATION AND MONITORING.     - INFORMED THE PATIENT HE CAN HAVE TWO VISITORS WITH HIM THE DAY OF THE PROCEDURE    - INSTRUCTED PT TO PARK IN THE PARKING GARAGE, ENTER THE HOSPITAL THRU ENTRANCE A AND  CHECK IN AT THE MAIN REGISTRATION DESK, AFTER REGISTRATION PT WILL BE TAKEN THE THE PREOP AREA FOR HIS PROCEDURE.    -ARRIVAL TIME GIVEN BY CARDIOLOGIST OFFICE, INFORMED PT IF ARRIVAL TIME CHANGES OR ADJUSTMENTS NEED TO BE MADE IN THEIR ARRIVAL TIME, HE/SHE WOULD RECEIVE A CALL.    -INSTRUCTED PT TO COME TO [facility] TO GET THEIR LABS/ EKG DONE PRIOR TO PROCEDURE      - PATIENT VERBALIZED UNDERSTANDING

## 2023-04-03 ENCOUNTER — LAB (OUTPATIENT)
Dept: LAB | Facility: HOSPITAL | Age: 53
End: 2023-04-03
Payer: OTHER GOVERNMENT

## 2023-04-03 LAB
ANION GAP SERPL CALCULATED.3IONS-SCNC: 10.8 MMOL/L (ref 5–15)
BUN SERPL-MCNC: 18 MG/DL (ref 6–20)
BUN/CREAT SERPL: 14.1 (ref 7–25)
CALCIUM SPEC-SCNC: 9.2 MG/DL (ref 8.6–10.5)
CHLORIDE SERPL-SCNC: 106 MMOL/L (ref 98–107)
CO2 SERPL-SCNC: 23.2 MMOL/L (ref 22–29)
CREAT SERPL-MCNC: 1.28 MG/DL (ref 0.76–1.27)
DEPRECATED RDW RBC AUTO: 43.7 FL (ref 37–54)
EGFRCR SERPLBLD CKD-EPI 2021: 67.8 ML/MIN/1.73
ERYTHROCYTE [DISTWIDTH] IN BLOOD BY AUTOMATED COUNT: 14.2 % (ref 12.3–15.4)
GLUCOSE SERPL-MCNC: 122 MG/DL (ref 65–99)
HCT VFR BLD AUTO: 48.5 % (ref 37.5–51)
HGB BLD-MCNC: 16.7 G/DL (ref 13–17.7)
INR PPP: 1 (ref 0.86–1.15)
MCH RBC QN AUTO: 29.3 PG (ref 26.6–33)
MCHC RBC AUTO-ENTMCNC: 34.4 G/DL (ref 31.5–35.7)
MCV RBC AUTO: 85.2 FL (ref 79–97)
PLATELET # BLD AUTO: 291 10*3/MM3 (ref 140–450)
PMV BLD AUTO: 10.2 FL (ref 6–12)
POTASSIUM SERPL-SCNC: 4.3 MMOL/L (ref 3.5–5.2)
PROTHROMBIN TIME: 13.3 SECONDS (ref 11.8–14.9)
RBC # BLD AUTO: 5.69 10*6/MM3 (ref 4.14–5.8)
SODIUM SERPL-SCNC: 140 MMOL/L (ref 136–145)
WBC NRBC COR # BLD: 8.15 10*3/MM3 (ref 3.4–10.8)

## 2023-04-03 PROCEDURE — 85610 PROTHROMBIN TIME: CPT | Performed by: NURSE PRACTITIONER

## 2023-04-03 PROCEDURE — 80048 BASIC METABOLIC PNL TOTAL CA: CPT | Performed by: NURSE PRACTITIONER

## 2023-04-03 PROCEDURE — 85027 COMPLETE CBC AUTOMATED: CPT | Performed by: NURSE PRACTITIONER

## 2023-04-04 ENCOUNTER — ANESTHESIA (OUTPATIENT)
Dept: CARDIOLOGY | Facility: HOSPITAL | Age: 53
End: 2023-04-04
Payer: OTHER GOVERNMENT

## 2023-04-04 ENCOUNTER — ANESTHESIA EVENT (OUTPATIENT)
Dept: CARDIOLOGY | Facility: HOSPITAL | Age: 53
End: 2023-04-04
Payer: OTHER GOVERNMENT

## 2023-04-04 ENCOUNTER — TELEPHONE (OUTPATIENT)
Dept: CARDIOLOGY | Facility: CLINIC | Age: 53
End: 2023-04-04
Payer: OTHER GOVERNMENT

## 2023-04-04 ENCOUNTER — HOSPITAL ENCOUNTER (OUTPATIENT)
Facility: HOSPITAL | Age: 53
Discharge: HOME | End: 2023-04-05
Attending: INTERNAL MEDICINE | Admitting: INTERNAL MEDICINE
Payer: OTHER GOVERNMENT

## 2023-04-04 ENCOUNTER — APPOINTMENT (OUTPATIENT)
Dept: GENERAL RADIOLOGY | Facility: HOSPITAL | Age: 53
End: 2023-04-04
Payer: OTHER GOVERNMENT

## 2023-04-04 DIAGNOSIS — I50.22 CHRONIC HFREF (HEART FAILURE WITH REDUCED EJECTION FRACTION): ICD-10-CM

## 2023-04-04 PROCEDURE — 33270 INS/REP SUBQ DEFIBRILLATOR: CPT | Performed by: INTERNAL MEDICINE

## 2023-04-04 PROCEDURE — 94799 UNLISTED PULMONARY SVC/PX: CPT

## 2023-04-04 PROCEDURE — 94761 N-INVAS EAR/PLS OXIMETRY MLT: CPT

## 2023-04-04 PROCEDURE — C1894 INTRO/SHEATH, NON-LASER: HCPCS | Performed by: INTERNAL MEDICINE

## 2023-04-04 PROCEDURE — C1896 LEAD, AICD, NON SING/DUAL: HCPCS | Performed by: INTERNAL MEDICINE

## 2023-04-04 PROCEDURE — 71045 X-RAY EXAM CHEST 1 VIEW: CPT

## 2023-04-04 PROCEDURE — 25010000002 CEFAZOLIN PER 500 MG: Performed by: INTERNAL MEDICINE

## 2023-04-04 PROCEDURE — C1722 AICD, SINGLE CHAMBER: HCPCS | Performed by: INTERNAL MEDICINE

## 2023-04-04 DEVICE — SUBCUTANEOUS ELECTRODE
Type: IMPLANTABLE DEVICE | Status: FUNCTIONAL
Brand: EMBLEM™ S-ICD

## 2023-04-04 DEVICE — SUBCUTANEOUS IMPLANTABLE CARDIOVERTER DEFIBRILLATOR
Type: IMPLANTABLE DEVICE | Status: FUNCTIONAL
Brand: EMBLEM™ MRI S-ICD

## 2023-04-04 RX ORDER — CEFAZOLIN SODIUM IN 0.9 % NACL 3 G/100 ML
3 INTRAVENOUS SOLUTION, PIGGYBACK (ML) INTRAVENOUS ONCE
Status: COMPLETED | OUTPATIENT
Start: 2023-04-04 | End: 2023-04-05

## 2023-04-04 RX ORDER — ACETAMINOPHEN 325 MG/1
650 TABLET ORAL EVERY 4 HOURS PRN
Status: DISCONTINUED | OUTPATIENT
Start: 2023-04-04 | End: 2023-04-05 | Stop reason: HOSPADM

## 2023-04-04 RX ORDER — SODIUM CHLORIDE 0.9 % (FLUSH) 0.9 %
1-10 SYRINGE (ML) INJECTION AS NEEDED
Status: DISCONTINUED | OUTPATIENT
Start: 2023-04-04 | End: 2023-04-05 | Stop reason: HOSPADM

## 2023-04-04 RX ORDER — SODIUM CHLORIDE, SODIUM LACTATE, POTASSIUM CHLORIDE, CALCIUM CHLORIDE 600; 310; 30; 20 MG/100ML; MG/100ML; MG/100ML; MG/100ML
9 INJECTION, SOLUTION INTRAVENOUS CONTINUOUS PRN
Status: DISCONTINUED | OUTPATIENT
Start: 2023-04-04 | End: 2023-04-05 | Stop reason: HOSPADM

## 2023-04-04 RX ORDER — ACETAMINOPHEN 650 MG/1
650 SUPPOSITORY RECTAL EVERY 4 HOURS PRN
Status: DISCONTINUED | OUTPATIENT
Start: 2023-04-04 | End: 2023-04-05 | Stop reason: HOSPADM

## 2023-04-04 RX ORDER — FUROSEMIDE 40 MG/1
40 TABLET ORAL 2 TIMES DAILY
Status: DISCONTINUED | OUTPATIENT
Start: 2023-04-04 | End: 2023-04-05 | Stop reason: HOSPADM

## 2023-04-04 RX ORDER — BUPIVACAINE HYDROCHLORIDE 5 MG/ML
INJECTION, SOLUTION EPIDURAL; INTRACAUDAL
Status: DISCONTINUED | OUTPATIENT
Start: 2023-04-04 | End: 2023-04-04 | Stop reason: HOSPADM

## 2023-04-04 RX ORDER — CEFAZOLIN SODIUM IN 0.9 % NACL 3 G/100 ML
3 INTRAVENOUS SOLUTION, PIGGYBACK (ML) INTRAVENOUS EVERY 8 HOURS
Status: COMPLETED | OUTPATIENT
Start: 2023-04-04 | End: 2023-04-05

## 2023-04-04 RX ORDER — SODIUM CHLORIDE 0.9 % (FLUSH) 0.9 %
10 SYRINGE (ML) INJECTION EVERY 12 HOURS SCHEDULED
Status: DISCONTINUED | OUTPATIENT
Start: 2023-04-04 | End: 2023-04-04 | Stop reason: HOSPADM

## 2023-04-04 RX ORDER — SODIUM CHLORIDE 0.9 % (FLUSH) 0.9 %
10 SYRINGE (ML) INJECTION AS NEEDED
Status: DISCONTINUED | OUTPATIENT
Start: 2023-04-04 | End: 2023-04-04 | Stop reason: HOSPADM

## 2023-04-04 RX ORDER — SODIUM CHLORIDE 9 MG/ML
40 INJECTION, SOLUTION INTRAVENOUS AS NEEDED
Status: DISCONTINUED | OUTPATIENT
Start: 2023-04-04 | End: 2023-04-04 | Stop reason: HOSPADM

## 2023-04-04 RX ORDER — SODIUM CHLORIDE 9 MG/ML
40 INJECTION, SOLUTION INTRAVENOUS AS NEEDED
Status: DISCONTINUED | OUTPATIENT
Start: 2023-04-04 | End: 2023-04-05 | Stop reason: HOSPADM

## 2023-04-04 RX ORDER — KETOROLAC TROMETHAMINE 30 MG/ML
30 INJECTION, SOLUTION INTRAMUSCULAR; INTRAVENOUS EVERY 8 HOURS
Status: DISCONTINUED | OUTPATIENT
Start: 2023-04-04 | End: 2023-04-05 | Stop reason: HOSPADM

## 2023-04-04 RX ORDER — TEMAZEPAM 15 MG/1
15 CAPSULE ORAL NIGHTLY PRN
Status: DISCONTINUED | OUTPATIENT
Start: 2023-04-04 | End: 2023-04-05 | Stop reason: HOSPADM

## 2023-04-04 RX ORDER — LIDOCAINE HYDROCHLORIDE 20 MG/ML
INJECTION, SOLUTION INFILTRATION; PERINEURAL
Status: DISCONTINUED | OUTPATIENT
Start: 2023-04-04 | End: 2023-04-04 | Stop reason: HOSPADM

## 2023-04-04 RX ORDER — ROSUVASTATIN CALCIUM 20 MG/1
40 TABLET, COATED ORAL DAILY
Status: DISCONTINUED | OUTPATIENT
Start: 2023-04-04 | End: 2023-04-05

## 2023-04-04 RX ORDER — CARVEDILOL 25 MG/1
25 TABLET ORAL 2 TIMES DAILY WITH MEALS
Status: DISCONTINUED | OUTPATIENT
Start: 2023-04-04 | End: 2023-04-05 | Stop reason: HOSPADM

## 2023-04-04 RX ORDER — AMLODIPINE BESYLATE 10 MG/1
10 TABLET ORAL DAILY
Status: DISCONTINUED | OUTPATIENT
Start: 2023-04-04 | End: 2023-04-05 | Stop reason: HOSPADM

## 2023-04-04 RX ORDER — ONDANSETRON 2 MG/ML
4 INJECTION INTRAMUSCULAR; INTRAVENOUS EVERY 6 HOURS PRN
Status: DISCONTINUED | OUTPATIENT
Start: 2023-04-04 | End: 2023-04-05 | Stop reason: HOSPADM

## 2023-04-04 RX ORDER — SODIUM CHLORIDE 0.9 % (FLUSH) 0.9 %
3 SYRINGE (ML) INJECTION EVERY 12 HOURS SCHEDULED
Status: DISCONTINUED | OUTPATIENT
Start: 2023-04-04 | End: 2023-04-05 | Stop reason: HOSPADM

## 2023-04-04 RX ADMIN — FUROSEMIDE 40 MG: 40 TABLET ORAL at 20:04

## 2023-04-04 RX ADMIN — SACUBITRIL AND VALSARTAN 1 TABLET: 24; 26 TABLET, FILM COATED ORAL at 20:48

## 2023-04-04 RX ADMIN — ROSUVASTATIN 40 MG: 20 TABLET, FILM COATED ORAL at 19:55

## 2023-04-04 RX ADMIN — CARVEDILOL 25 MG: 25 TABLET, FILM COATED ORAL at 19:56

## 2023-04-04 RX ADMIN — CEFAZOLIN SODIUM 3 G: 10 INJECTION, POWDER, FOR SOLUTION INTRAVENOUS at 21:01

## 2023-04-04 RX ADMIN — EMPAGLIFLOZIN 10 MG: 10 TABLET, FILM COATED ORAL at 19:56

## 2023-04-04 RX ADMIN — Medication 3 ML: at 20:48

## 2023-04-04 RX ADMIN — Medication 3 G: at 14:22

## 2023-04-04 NOTE — TELEPHONE ENCOUNTER
----- Message from Any King MA sent at 4/4/2023  8:10 AM EDT -----  Regarding: FW:    ----- Message -----  From: Adrienne Clemons APRN  Sent: 4/3/2023   2:43 PM EDT  To: nAy King MA  Subject: FW:                                              ----- Message -----  From: Lab, Background User  Sent: 4/3/2023   2:01 PM EDT  To: ROYCE Browne

## 2023-04-04 NOTE — Clinical Note
Prepped: left chest and left abdomen. Prepped with: ChloraPrep. The site was clipped. The patient was draped in a sterile fashion. No

## 2023-04-04 NOTE — TELEPHONE ENCOUNTER
----- Message from Any King MA sent at 4/4/2023  8:10 AM EDT -----  Regarding: FW:    ----- Message -----  From: Adrienne Clemons APRN  Sent: 4/3/2023   2:43 PM EDT  To: Any King MA  Subject: FW:                                              ----- Message -----  From: Lab, Background User  Sent: 4/3/2023   2:01 PM EDT  To: ROYCE Browne

## 2023-04-04 NOTE — ANESTHESIA PREPROCEDURE EVALUATION
Anesthesia Evaluation     Patient summary reviewed and Nursing notes reviewed   no history of anesthetic complications:  NPO Solid Status: > 8 hours  NPO Liquid Status: > 2 hours           Airway   Mallampati: III  TM distance: >3 FB  Neck ROM: full  Possible difficult intubation  Dental    (+) poor dentition    Comment: Severe caries, chipped      Pulmonary - negative pulmonary ROS and normal exam    breath sounds clear to auscultation  Cardiovascular - normal exam  Exercise tolerance: good (4-7 METS)    Rhythm: regular  Rate: normal    (+) hypertension, past MI , cardiac stents more than 12 months ago dysrhythmias Paroxysmal Atrial Fib, CHF ,     ROS comment:  Estimated left ventricular EF = 30% Left ventricular systolic function is moderately decreased.  •  The left ventricular cavity is moderately dilated.  •  Left ventricular wall thickness is consistent with moderate concentric hypertrophy.  •  No obvious thrombus seen in the apex although it does appear to be more hypokinetic and there are some trabeculations of the myocardium which make it impossible to fully exclude      Neuro/Psych  (+) CVA,    GI/Hepatic/Renal/Endo    (+)   renal disease CRI,     Musculoskeletal (-) negative ROS    Abdominal    Substance History - negative use     OB/GYN negative ob/gyn ROS         Other - negative ROS       ROS/Med Hx Other: PAT Nursing Notes unavailable.                 Anesthesia Plan    ASA 4     general       Anesthetic plan, risks, benefits, and alternatives have been provided, discussed and informed consent has been obtained with: patient and spouse/significant other.        CODE STATUS:

## 2023-04-05 VITALS
WEIGHT: 305.34 LBS | TEMPERATURE: 97.5 F | HEIGHT: 69 IN | DIASTOLIC BLOOD PRESSURE: 78 MMHG | SYSTOLIC BLOOD PRESSURE: 122 MMHG | BODY MASS INDEX: 45.22 KG/M2 | HEART RATE: 69 BPM | RESPIRATION RATE: 16 BRPM | OXYGEN SATURATION: 95 %

## 2023-04-05 PROCEDURE — 25010000002 CEFAZOLIN PER 500 MG: Performed by: INTERNAL MEDICINE

## 2023-04-05 RX ORDER — NAPROXEN 250 MG/1
250 TABLET ORAL 2 TIMES DAILY WITH MEALS
Qty: 14 TABLET | Refills: 0 | Status: SHIPPED | OUTPATIENT
Start: 2023-04-05

## 2023-04-05 RX ORDER — CEPHALEXIN 500 MG/1
500 CAPSULE ORAL 3 TIMES DAILY
Qty: 12 CAPSULE | Refills: 0 | Status: SHIPPED | OUTPATIENT
Start: 2023-04-05

## 2023-04-05 RX ORDER — CLOPIDOGREL BISULFATE 75 MG/1
75 TABLET ORAL DAILY
Qty: 90 TABLET | Refills: 3
Start: 2023-04-05

## 2023-04-05 RX ORDER — ROSUVASTATIN CALCIUM 20 MG/1
40 TABLET, COATED ORAL NIGHTLY
Status: DISCONTINUED | OUTPATIENT
Start: 2023-04-05 | End: 2023-04-05 | Stop reason: HOSPADM

## 2023-04-05 RX ADMIN — AMLODIPINE BESYLATE 10 MG: 10 TABLET ORAL at 08:26

## 2023-04-05 RX ADMIN — SACUBITRIL AND VALSARTAN 1 TABLET: 24; 26 TABLET, FILM COATED ORAL at 08:26

## 2023-04-05 RX ADMIN — EMPAGLIFLOZIN 10 MG: 10 TABLET, FILM COATED ORAL at 08:26

## 2023-04-05 RX ADMIN — CEFAZOLIN SODIUM 3 G: 10 INJECTION, POWDER, FOR SOLUTION INTRAVENOUS at 05:19

## 2023-04-05 RX ADMIN — FUROSEMIDE 40 MG: 40 TABLET ORAL at 08:26

## 2023-04-05 RX ADMIN — CARVEDILOL 25 MG: 25 TABLET, FILM COATED ORAL at 08:26

## 2023-04-05 NOTE — INTERVAL H&P NOTE
H&P reviewed. The patient was examined and there are no changes to the H&P.    The risk for a subcutaneous ICD insertion was discussed with the patient.  Including the risk of infection, bleeding, lead dislodgment, pneumothorax, heart perforation, tamponade, and death.  After discussion with the patient using shared decision making agreed on proceeding

## 2023-04-05 NOTE — PLAN OF CARE
Problem: Adult Inpatient Plan of Care  Goal: Plan of Care Review  Outcome: Ongoing, Progressing  Goal: Patient-Specific Goal (Individualized)  Outcome: Ongoing, Progressing  Goal: Absence of Hospital-Acquired Illness or Injury  Outcome: Ongoing, Progressing  Intervention: Identify and Manage Fall Risk  Recent Flowsheet Documentation  Taken 4/5/2023 0600 by Mandy Patel RN  Safety Promotion/Fall Prevention: safety round/check completed  Taken 4/5/2023 0530 by Mandy Patel RN  Safety Promotion/Fall Prevention: safety round/check completed  Taken 4/5/2023 0400 by Mandy Patel RN  Safety Promotion/Fall Prevention: safety round/check completed  Taken 4/5/2023 0200 by Mandy Patel RN  Safety Promotion/Fall Prevention: safety round/check completed  Taken 4/5/2023 0128 by Mandy Patel RN  Safety Promotion/Fall Prevention: safety round/check completed  Taken 4/5/2023 0000 by Mandy Patel RN  Safety Promotion/Fall Prevention: safety round/check completed  Taken 4/4/2023 2300 by Mandy Patel RN  Safety Promotion/Fall Prevention: safety round/check completed  Taken 4/4/2023 2200 by Mandy Patel RN  Safety Promotion/Fall Prevention: safety round/check completed  Taken 4/4/2023 2135 by Mandy Patel RN  Safety Promotion/Fall Prevention: safety round/check completed  Taken 4/4/2023 2100 by Mandy Patel RN  Safety Promotion/Fall Prevention: safety round/check completed  Taken 4/4/2023 2000 by Mandy Patel RN  Safety Promotion/Fall Prevention: safety round/check completed  Taken 4/4/2023 1940 by Mandy Patel RN  Safety Promotion/Fall Prevention: safety round/check completed  Taken 4/4/2023 1905 by Mandy Patel RN  Safety Promotion/Fall Prevention: safety round/check completed  Intervention: Prevent Skin Injury  Recent Flowsheet Documentation  Taken 4/4/2023 2000 by Mandy Patel RN  Body Position: position changed independently  Intervention: Prevent and Manage VTE (Venous  Thromboembolism) Risk  Recent Flowsheet Documentation  Taken 4/5/2023 0530 by Mandy Patel, RN  Activity Management: ambulated to bathroom  Taken 4/4/2023 2000 by Mandy Patel, RN  Activity Management: activity encouraged  Range of Motion: active ROM (range of motion) encouraged  Goal: Optimal Comfort and Wellbeing  Outcome: Ongoing, Progressing  Intervention: Provide Person-Centered Care  Recent Flowsheet Documentation  Taken 4/4/2023 2000 by Mandy Patel RN  Trust Relationship/Rapport:   care explained   questions encouraged   thoughts/feelings acknowledged  Goal: Readiness for Transition of Care  Outcome: Ongoing, Progressing     Problem: Hypertension Comorbidity  Goal: Blood Pressure in Desired Range  Outcome: Ongoing, Progressing   Goal Outcome Evaluation:

## 2023-04-05 NOTE — PLAN OF CARE
Goal Outcome Evaluation:       Patient to discharge home and follow up with Dr. Alonso at scheduled appointment.

## 2023-04-10 ENCOUNTER — OFFICE VISIT (OUTPATIENT)
Dept: INTERNAL MEDICINE | Facility: CLINIC | Age: 53
End: 2023-04-10
Payer: OTHER GOVERNMENT

## 2023-04-10 VITALS
WEIGHT: 296.6 LBS | BODY MASS INDEX: 43.93 KG/M2 | HEIGHT: 69 IN | TEMPERATURE: 97.9 F | SYSTOLIC BLOOD PRESSURE: 115 MMHG | HEART RATE: 67 BPM | DIASTOLIC BLOOD PRESSURE: 79 MMHG | OXYGEN SATURATION: 98 %

## 2023-04-10 DIAGNOSIS — I48.0 PAROXYSMAL ATRIAL FIBRILLATION: ICD-10-CM

## 2023-04-10 DIAGNOSIS — F17.210 CIGARETTE NICOTINE DEPENDENCE WITHOUT COMPLICATION: ICD-10-CM

## 2023-04-10 DIAGNOSIS — I10 PRIMARY HYPERTENSION: ICD-10-CM

## 2023-04-10 DIAGNOSIS — I50.22 CHRONIC SYSTOLIC CHF (CONGESTIVE HEART FAILURE): Primary | ICD-10-CM

## 2023-04-10 DIAGNOSIS — Z95.810 S/P ICD (INTERNAL CARDIAC DEFIBRILLATOR) PROCEDURE: ICD-10-CM

## 2023-04-10 NOTE — PROGRESS NOTES
Transitional Care Follow Up Visit  Subjective     Yefri Mcgee is a 52 y.o. male who presents for a transitional care management visit.    Within 48 business hours after discharge our office contacted him via telephone to coordinate his care and needs.      I reviewed and discussed the details of that call along with the discharge summary, hospital problems, inpatient lab results, inpatient diagnostic studies, and consultation reports with Yefri.     Current outpatient and discharge medications have been reconciled for the patient.  Reviewed by: Parmjit Arana MD      No flowsheet data found.   Risk for Readmission (LACE) Score: 6 (4/5/2023  6:00 AM)      History of Present Illness     Course During Hospital Stay:  4/4/23-4/5/23- defibrillator implant      The following portions of the patient's history were reviewed and updated as appropriate: allergies, current medications, past family history, past medical history, past social history, past surgical history, and problem list.      Admitted 4/4/23 to 4/5/25 for ICD placement.  Here with wife today for follow up.  Doing well.  Seeing Dr. Alonso tomorrow.  Off xarelto until okay'ed by Dr. Alonso.    Still smoking 3/4 PPD.  Not interested in quitting at this time.    Objective   Vitals:    04/10/23 1116   BP: 115/79   Pulse: 67   Temp: 97.9 °F (36.6 °C)   SpO2: 98%       Appearance: No acute distress, well-nourished  Head: normocephalic, atraumatic  Eyes: extraocular movements intact, no scleral icterus, no conjunctival injection  Ears, Nose, and Throat: peeling noted right external ear, left external ear unremarkable, nares patent  Cardiovascular: regular rate and rhythm. no murmurs, rales, or rhonchi. no edema  Respiratory: breathing comfortably, symmetric chest rise, clear to auscultation bilaterally. No wheezes, rales, or rhonchi.  Chest: surgical sites examined, no erythema, no induration, overall reassuring exam  GI: soft, NTTP, no masses or HSM  Neuro: alert  and oriented  Psych: normal mood and affect     Result Review :   The following data was reviewed by: Parmjit Arana MD on 04/10/2023:  Common labs    Common Labs 11/15/22 11/15/22 11/15/22 11/15/22 3/16/23 4/3/23 4/3/23    1045 1045 1045 1045  1348 1348   Glucose 88    143 (A)  122 (A)   BUN 12    15  18   Creatinine 1.21    1.32 (A)  1.28 (A)   Sodium 137    141  140   Potassium 4.1    4.0  4.3   Chloride 102    104  106   Calcium 9.5    9.9  9.2   Albumin 4.20         Total Bilirubin 0.5         Alkaline Phosphatase 56         AST (SGOT) 16         ALT (SGPT) 14         WBC   8.60   8.15    Hemoglobin   16.5   16.7    Hematocrit   48.7   48.5    Platelets   291   291    Total Cholesterol  184        Triglycerides  289 (A)        HDL Cholesterol  25 (A)        LDL Cholesterol   109 (A)        Hemoglobin A1C    6.00 (A)      (A) Abnormal value                   Lab Results   Component Value Date    INR 1.00 04/03/2023       Assessment & Plan     Diagnoses and all orders for this visit:    1. Chronic systolic CHF (Primary)    2. Primary hypertension    3. Paroxysmal atrial fibrillation    4. S/P ICD (internal cardiac defibrillator) procedure    5. Cigarette nicotine dependence without complication      CHF, ICD placement:  -stable    A Fib:  -stable, will likely re-start xarelto in near term    HTN:  -stable, BP at goal    Tobacco:  -counseled today on the health risks of tobacco use  -strongly counseled today on the importance of tobacco cessation      There are no discontinued medications.    Return if symptoms worsen or fail to improve.           Parmjit Arana MD  04/10/23  12:01 EDT

## 2023-04-11 ENCOUNTER — CLINICAL SUPPORT NO REQUIREMENTS (OUTPATIENT)
Dept: CARDIOLOGY | Facility: CLINIC | Age: 53
End: 2023-04-11
Payer: OTHER GOVERNMENT

## 2023-04-11 DIAGNOSIS — I50.22 CHRONIC HFREF (HEART FAILURE WITH REDUCED EJECTION FRACTION): ICD-10-CM

## 2023-04-11 DIAGNOSIS — Z95.810 ICD (IMPLANTABLE CARDIOVERTER-DEFIBRILLATOR), DUAL, IN SITU: ICD-10-CM

## 2023-04-11 DIAGNOSIS — I48.0 PAROXYSMAL ATRIAL FIBRILLATION: ICD-10-CM

## 2023-04-11 DIAGNOSIS — I50.22 CHRONIC SYSTOLIC CHF (CONGESTIVE HEART FAILURE): Primary | ICD-10-CM

## 2023-04-11 NOTE — PROGRESS NOTES
Normal [facility] S-ICD  Device Interrogation and Device Testing.  Normal evaluation of device function and lead measurements.  No optimization was needed of parameters or maximization of device longevity.  Patient is on automated Home Remote Monitoring.  Patients wound incision is healing and all corners are intact.  There are no signs of infection, no drainage, no swelling and cool to touch.  We reviewed Home remote follow up and the monitoring machine.  We also discussed the six week healing process and the do's and don'ts of activity that it specific to the patient.

## 2023-04-13 ENCOUNTER — TELEPHONE (OUTPATIENT)
Dept: INTERNAL MEDICINE | Facility: CLINIC | Age: 53
End: 2023-04-13
Payer: OTHER GOVERNMENT

## 2023-05-24 ENCOUNTER — OFFICE VISIT (OUTPATIENT)
Dept: CARDIOLOGY | Facility: CLINIC | Age: 53
End: 2023-05-24
Payer: OTHER GOVERNMENT

## 2023-05-24 VITALS
WEIGHT: 289.6 LBS | SYSTOLIC BLOOD PRESSURE: 120 MMHG | BODY MASS INDEX: 42.89 KG/M2 | HEIGHT: 69 IN | DIASTOLIC BLOOD PRESSURE: 78 MMHG | HEART RATE: 75 BPM

## 2023-05-24 DIAGNOSIS — Z95.810 ICD (IMPLANTABLE CARDIOVERTER-DEFIBRILLATOR), DUAL, IN SITU: Primary | ICD-10-CM

## 2023-05-24 RX ORDER — ASPIRIN 81 MG/1
81 TABLET ORAL DAILY
COMMUNITY

## 2023-05-24 NOTE — PROGRESS NOTES
"Chief Complaint  Follow-up    Subjective            History of Present Illness  Yefri Mcgee is a 52-year-old white/ male patient who presents to the office today for follow-up after having subcutaneous ICD implanted with Dr. Alonso on 4/4/2023.  He had his device interrogated on 4/11/2023 which shows normal function and testing.  He denies any postoperative complications.  He reports compliance with all of his medications.  He denies any chest pain, shortness of breath, lightheadedness/dizziness, palpitations, or edema.    PMH  Past Medical History:   Diagnosis Date   • Chronic kidney disease     NO DIALYSIS   • Chronic systolic CHF 08/09/2021 07/28/2021 Heart Cath: Moderately reduced left ventricular systolic function with an estimated ejection fraction of 35%    • Head injury     NO RESIDUAL   • History of (STEMI) due to occlusion of mid portion of left anterior descending (LAD) coronary artery 07/28/2021    STENT PLACED   • Hyperlipidemia    • Hypertension    • ICD (implantable cardioverter-defibrillator) \"MRI COMPATIBLE S-ICD [employer] A219    • MRI safe cardiac ICD in situ    • Paroxysmal atrial fibrillation 01/15/2019   • PFO (patent foramen ovale) 06/2014   • STEMI involving left anterior descending coronary artery 07/2021    also previous PCI in 2014   • Stroke     NO RESIDUAL         ALLERGY  Allergies   Allergen Reactions   • Eliquis [Apixaban] Paresthesia          SURGICALHX  Past Surgical History:   Procedure Laterality Date   • CARDIAC CATHETERIZATION N/A 7/28/2021    Procedure: Left Heart Cath;  Surgeon: Enrique Jack MD;  Location: [facility] CATH INVASIVE LOCATION;  Service: Cardiovascular;  Laterality: N/A;   • CARDIAC ELECTROPHYSIOLOGY PROCEDURE N/A 4/4/2023    Procedure: ICD SUBCUTANEOUS new;  Surgeon: Leo Alonso MD;  Location: [facility] CATH INVASIVE LOCATION;  Service: Cardiovascular;  Laterality: N/A;  HOLD PLAVIX 5 DAYS PRIOR  HOLD XARELTO 2 DAYS PRIOR   • CARDIAC " SURGERY      HEART BYPASS    • CAROTID STENT            SOC  Social History     Socioeconomic History   • Marital status:    Tobacco Use   • Smoking status: Every Day     Packs/day: 1.00     Years: 35.00     Pack years: 35.00     Types: Cigarettes     Start date: 1985   • Smokeless tobacco: Never   • Tobacco comments:     CURRENT EVERYDAY SMOKER, SMOKED 31 OR MORE YEARS       INST PER ANESTHESIA PROTOCOL   Vaping Use   • Vaping Use: Former   • Substances: Nicotine, Flavoring   • Devices: Refillable tank   Substance and Sexual Activity   • Alcohol use: Yes     Comment: DRINKS monthly, LESS THAN 1 DRINK PER DAY, HAS BEEN DRINKING FOR 31 OR MORE YEARS   • Drug use: Never   • Sexual activity: Defer         FAMHX  Family History   Problem Relation Age of Onset   • Cancer Mother    • Heart disease Father    • Diabetes Father    • Malig Hyperthermia Neg Hx           MEDSIGONLY  Current Outpatient Medications on File Prior to Visit   Medication Sig   • amLODIPine (NORVASC) 10 MG tablet Take 1 tablet by mouth Daily.   • aspirin 81 MG EC tablet Take 1 tablet by mouth Daily.   • carvedilol (COREG) 25 MG tablet Take 1 tablet by mouth 2 (Two) Times a Day With Meals.   • clopidogrel (PLAVIX) 75 MG tablet Take 1 tablet by mouth Daily. PT REPORTS HE WAS NOT TOLD TO HOLD THIS PER METZ STATES HIS LAST DOSE WAS 3/31/23 A.M. MESSAGE SENT TO SALMA MORA RN 3/31/23 2611   • empagliflozin (JARDIANCE) 10 MG tablet tablet Take 1 tablet by mouth Daily.   • ezetimibe (ZETIA) 10 MG tablet Take 1 tablet by mouth Daily for 90 days.   • furosemide (LASIX) 40 MG tablet Take 1 tablet by mouth 2 (Two) Times a Day.   • rivaroxaban (XARELTO) 20 MG tablet Take 1 tablet by mouth Daily for 90 days. Patient to hold until follow up for wound check in on 4/10 and then can resume Q24H dosing   • rosuvastatin (CRESTOR) 40 MG tablet Take 1 tablet by mouth Daily.   • sacubitril-valsartan (Entresto) 24-26 MG tablet Take 1 tablet by mouth 2 (Two) Times a Day.  "  • [DISCONTINUED] cephalexin (Keflex) 500 MG capsule Take 1 capsule by mouth 3 (Three) Times a Day. (Patient not taking: Reported on 5/24/2023)   • [DISCONTINUED] naproxen (NAPROSYN) 250 MG tablet Take 1 tablet by mouth 2 (Two) Times a Day With Meals. (Patient not taking: Reported on 5/24/2023)     No current facility-administered medications on file prior to visit.         Objective   /78   Pulse 75   Ht 175.3 cm (69\")   Wt 131 kg (289 lb 9.6 oz)   BMI 42.77 kg/m²       Physical Exam  Constitutional:       Appearance: He is obese.   HENT:      Head: Normocephalic.   Neck:      Vascular: No carotid bruit.   Cardiovascular:      Rate and Rhythm: Normal rate and regular rhythm.      Pulses: Normal pulses.      Heart sounds: Normal heart sounds. No murmur heard.  Pulmonary:      Effort: Pulmonary effort is normal.      Breath sounds: Normal breath sounds.   Musculoskeletal:      Cervical back: Neck supple.      Right lower leg: No edema.      Left lower leg: No edema.   Skin:     General: Skin is dry.   Neurological:      Mental Status: He is alert and oriented to person, place, and time.   Psychiatric:         Behavior: Behavior normal.       Result Review :   The following data was reviewed by: ROYCE Frankel on 05/24/2023:  proBNP   Date Value Ref Range Status   03/16/2023 349.0 0.0 - 900.0 pg/mL Final     CMP        4/3/2023    13:48   CMP   Glucose 122     BUN 18     Creatinine 1.28     EGFR 67.8     Sodium 140     Potassium 4.3     Chloride 106     Calcium 9.2     Total Protein    Albumin    Globulin    Total Bilirubin    Alkaline Phosphatase    AST (SGOT)    ALT (SGPT)    Albumin/Globulin Ratio    BUN/Creatinine Ratio 14.1     Anion Gap 10.8       CBC w/diff        4/3/2023    13:48   CBC w/Diff   WBC 8.15     RBC 5.69     Hemoglobin 16.7     Hematocrit 48.5     MCV 85.2     MCH 29.3     MCHC 34.4     RDW 14.2     Platelets 291     Neutrophil Rel %    Immature Granulocyte Rel %    Lymphocyte " Rel %    Monocyte Rel %    Eosinophil Rel %    Basophil Rel %       Lab Results   Component Value Date    TSH 1.400 06/07/2021      No results found for: FREET4   No results found for: DDIMERQUANT  Magnesium   Date Value Ref Range Status   07/28/2021 1.8 1.6 - 2.6 mg/dL Final      No results found for: DIGOXIN   Lab Results   Component Value Date    TROPONINT <0.01 01/03/2019           Lipid Panel        11/15/2022    10:45   Lipid Panel   Total Cholesterol 184     Triglycerides 289     HDL Cholesterol 25     VLDL Cholesterol 50     LDL Cholesterol  109     LDL/HDL Ratio 4.05         Results for orders placed in visit on 10/11/22    Adult Transthoracic Echo Complete W/ Cont if Necessary Per Protocol    Interpretation Summary  •  Estimated left ventricular EF = 30% Left ventricular systolic function is moderately decreased.  •  The left ventricular cavity is moderately dilated.  •  Left ventricular wall thickness is consistent with moderate concentric hypertrophy.  •  No obvious thrombus seen in the apex although it does appear to be more hypokinetic and there are some trabeculations of the myocardium which make it impossible to fully exclude         Assessment and Plan    Diagnoses and all orders for this visit:    1. ICD (implantable cardioverter-defibrillator), dual, in situ (Primary)  He performed home remote monitor on 4/12/2023 which shows no events and 100% of battery life.  Repeat fasting lipid and hepatic function panel prior to next office visit.  Continue current meds.  -     Lipid Panel; Future  -     Hepatic Function Panel; Future            Follow Up   Return for Follow up with Dr Alonso as scheduled.    Patient was given instructions and counseling regarding his condition or for health maintenance advice. Please see specific information pulled into the AVS if appropriate.     Yefri Mcgee  reports that he has been smoking cigarettes. He started smoking about 38 years ago. He has a 35.00 pack-year  smoking history. He has never used smokeless tobacco.. I have educated him on the risk of diseases from using tobacco products such as cancer, COPD and heart disease.     I advised him to quit and he is not willing to quit.    I spent 3.5 minutes counseling the patient.           Adrienne Clemons, APRN  05/24/23  15:19 EDT    Dictated Utilizing Dragon Dictation

## 2023-09-25 ENCOUNTER — OFFICE VISIT (OUTPATIENT)
Dept: INTERNAL MEDICINE | Facility: CLINIC | Age: 53
End: 2023-09-25

## 2023-09-25 VITALS
DIASTOLIC BLOOD PRESSURE: 72 MMHG | BODY MASS INDEX: 42.18 KG/M2 | HEIGHT: 69 IN | TEMPERATURE: 97.1 F | HEART RATE: 66 BPM | OXYGEN SATURATION: 98 % | SYSTOLIC BLOOD PRESSURE: 107 MMHG | WEIGHT: 284.8 LBS

## 2023-09-25 DIAGNOSIS — Z12.11 COLON CANCER SCREENING: ICD-10-CM

## 2023-09-25 DIAGNOSIS — I48.0 PAROXYSMAL ATRIAL FIBRILLATION: ICD-10-CM

## 2023-09-25 DIAGNOSIS — Z95.5 S/P CORONARY ARTERY STENT PLACEMENT: ICD-10-CM

## 2023-09-25 DIAGNOSIS — I10 PRIMARY HYPERTENSION: ICD-10-CM

## 2023-09-25 DIAGNOSIS — E55.9 VITAMIN D DEFICIENCY: ICD-10-CM

## 2023-09-25 DIAGNOSIS — E78.5 HYPERLIPIDEMIA LDL GOAL <70: ICD-10-CM

## 2023-09-25 DIAGNOSIS — Z23 NEED FOR PNEUMOCOCCAL VACCINATION: ICD-10-CM

## 2023-09-25 DIAGNOSIS — I50.22 CHRONIC HFREF (HEART FAILURE WITH REDUCED EJECTION FRACTION): ICD-10-CM

## 2023-09-25 DIAGNOSIS — Z72.0 TOBACCO ABUSE: ICD-10-CM

## 2023-09-25 DIAGNOSIS — R73.01 IMPAIRED FASTING GLUCOSE: Primary | Chronic | ICD-10-CM

## 2023-09-25 DIAGNOSIS — Z23 NEED FOR INFLUENZA VACCINATION: ICD-10-CM

## 2023-09-25 LAB
ALBUMIN SERPL-MCNC: 4.2 G/DL (ref 3.5–5.2)
ALBUMIN/GLOB SERPL: 1.3 G/DL
ALP SERPL-CCNC: 63 U/L (ref 39–117)
ALT SERPL W P-5'-P-CCNC: 28 U/L (ref 1–41)
ANION GAP SERPL CALCULATED.3IONS-SCNC: 12 MMOL/L (ref 5–15)
AST SERPL-CCNC: 26 U/L (ref 1–40)
BILIRUB SERPL-MCNC: 0.5 MG/DL (ref 0–1.2)
BUN SERPL-MCNC: 12 MG/DL (ref 6–20)
BUN/CREAT SERPL: 8.7 (ref 7–25)
CALCIUM SPEC-SCNC: 9.7 MG/DL (ref 8.6–10.5)
CHLORIDE SERPL-SCNC: 103 MMOL/L (ref 98–107)
CHOLEST SERPL-MCNC: 99 MG/DL (ref 0–200)
CO2 SERPL-SCNC: 23 MMOL/L (ref 22–29)
CREAT SERPL-MCNC: 1.38 MG/DL (ref 0.76–1.27)
EGFRCR SERPLBLD CKD-EPI 2021: 61.9 ML/MIN/1.73
GLOBULIN UR ELPH-MCNC: 3.3 GM/DL
GLUCOSE SERPL-MCNC: 119 MG/DL (ref 65–99)
HBA1C MFR BLD: 6.5 % (ref 4.8–5.6)
HDLC SERPL-MCNC: 25 MG/DL (ref 40–60)
LDLC SERPL CALC-MCNC: 32 MG/DL (ref 0–100)
LDLC/HDLC SERPL: 0.71 {RATIO}
NT-PROBNP SERPL-MCNC: 311 PG/ML (ref 0–900)
POTASSIUM SERPL-SCNC: 4.3 MMOL/L (ref 3.5–5.2)
PROT SERPL-MCNC: 7.5 G/DL (ref 6–8.5)
SODIUM SERPL-SCNC: 138 MMOL/L (ref 136–145)
TRIGL SERPL-MCNC: 281 MG/DL (ref 0–150)
VLDLC SERPL-MCNC: 42 MG/DL (ref 5–40)

## 2023-09-25 PROCEDURE — 90686 IIV4 VACC NO PRSV 0.5 ML IM: CPT | Performed by: INTERNAL MEDICINE

## 2023-09-25 PROCEDURE — 90471 IMMUNIZATION ADMIN: CPT | Performed by: INTERNAL MEDICINE

## 2023-09-25 PROCEDURE — 83880 ASSAY OF NATRIURETIC PEPTIDE: CPT | Performed by: INTERNAL MEDICINE

## 2023-09-25 PROCEDURE — 99214 OFFICE O/P EST MOD 30 MIN: CPT | Performed by: INTERNAL MEDICINE

## 2023-09-25 PROCEDURE — 80061 LIPID PANEL: CPT | Performed by: INTERNAL MEDICINE

## 2023-09-25 PROCEDURE — 90677 PCV20 VACCINE IM: CPT | Performed by: INTERNAL MEDICINE

## 2023-09-25 PROCEDURE — 85025 COMPLETE CBC W/AUTO DIFF WBC: CPT | Performed by: INTERNAL MEDICINE

## 2023-09-25 PROCEDURE — 80053 COMPREHEN METABOLIC PANEL: CPT | Performed by: INTERNAL MEDICINE

## 2023-09-25 PROCEDURE — 90472 IMMUNIZATION ADMIN EACH ADD: CPT | Performed by: INTERNAL MEDICINE

## 2023-09-25 PROCEDURE — 83036 HEMOGLOBIN GLYCOSYLATED A1C: CPT | Performed by: INTERNAL MEDICINE

## 2023-09-25 NOTE — LETTER
[facility]  Vaccine Consent Form    Patient Name:  Yefri Mcgee  Patient :  1970     Vaccine(s) Ordered    Pneumococcal Conjugate Vaccine 20-Valent All  Fluzone (or Fluarix & Flulaval for VFC) >6mos        Screening Checklist  The following questions should be completed prior to vaccination. If you answer “yes” to any question, it does not necessarily mean you should not be vaccinated. It just means we may need to clarify or ask more questions. If a question is unclear, please ask your healthcare provider to explain it.    Yes No   Any fever or moderate to severe illness today (mild illness and/or antibiotic treatment are not contraindications)?     Do you have a history of a serious reaction to any previous vaccinations, such as anaphylaxis, encephalopathy within 7 days, Guillain-Grambling syndrome within 6 weeks, seizure?     Have you received any live vaccine(s) in the past month (MMR, JUAN)?     Do you have an anaphylactic allergy to latex (DTaP, DTaP-IPV, Hep A, Hep B, MenB, RV, Td, Tdap), baker’s yeast (Hep B, HPV), or gelatin (JUAN, MMR)?     Do you have an anaphylactic allergy to neomycin (Rabies, JUAN, MMR, IPV, Hep A), polymyxin B (IPV), or streptomycin (IPV)?      Any cancer, leukemia, AIDS, or other immune system disorder? (JUAN, MMR, RV)     Do you have a parent, brother, or sister with an immune system problem (if immune competence of vaccine recipient clinically verified, can proceed)? (MMR, JUAN)     Any recent steroid treatments for >2 weeks, chemotherapy, or radiation treatment? (JUAN, MMR)     Have you received antibody-containing blood transfusions or IVIG in the past 11 months (recommended interval is dependent on product)? (MMR, JUAN)     Have you taken antiviral drugs (acyclovir, famciclovir, valacyclovir) in the last 24 or 48 hours, respectively (JUAN)?      Are you pregnant or planning to become pregnant within 1 month? (JUAN, MMR, HPV, IPV, MenB; For hep B- refer to Engerix-B)     For  infants, have you ever been told your child has had intussusception or a medical emergency involving obstruction of the intestine (RV)? If not for an infant, can skip this question.         *Ordering Physician/APC should be consulted if “yes” is checked by the patient or guardian above.      I have received, read, and understand the Vaccine Information Statement (VIS) for each vaccine ordered above.  I have considered my health status as well as the health status of my close contacts.  I have taken the opportunity to discuss my vaccine questions with my health care provider.   I have requested that the ordered vaccine(s) be given to me.  I understand the benefits and risks of the vaccines.  I understand that I should remain in the clinic for 15 minutes after receiving the vaccine(s).  _________________________________________________________  Signature of Patient or Parent/Legal Guardian ____________________  Date

## 2023-09-25 NOTE — PROGRESS NOTES
"Chief Complaint  Follow-up (6 month ) and Dental Injury (Patient said 4-6 months before his initial strokes, his teeth were breaking off, states he had a tooth break off about 2 weeks ago and wonders if it could be an early warning sign. )    Subjective          Yefri Mcgee presents to [facility]  History of Present Illness  Hypertension- patient feels well. Patient denies Headaches, dizziness, chest pain.   Hyperlipidemia- doing well with statin and zetia.   DM2- patient is losing weight. Patient reports tolerating meds well. Due for labs.   Needs to complete cologuard and due for LDCT.      Current Outpatient Medications   Medication Instructions    amLODIPine (NORVASC) 10 mg, Oral, Daily    carvedilol (COREG) 25 mg, Oral, 2 Times Daily With Meals    clopidogrel (PLAVIX) 75 mg, Oral, Daily, PT REPORTS HE WAS NOT TOLD TO HOLD THIS PER ISAÍAS STATES HIS LAST DOSE WAS 3/31/23 A.M. MESSAGE SENT TO SALMA MORA RN 3/31/23 0140    empagliflozin (JARDIANCE) 10 mg, Oral, Daily    ezetimibe (ZETIA) 10 mg, Oral, Daily    furosemide (LASIX) 40 mg, Oral, 2 Times Daily    rivaroxaban (XARELTO) 20 mg, Oral, Daily, Patient to hold until follow up for wound check in on 4/10 and then can resume Q24H dosing    rosuvastatin (CRESTOR) 40 mg, Oral, Daily    sacubitril-valsartan (Entresto) 24-26 MG tablet 1 tablet, Oral, 2 Times Daily       The following portions of the patient's history were reviewed and updated as appropriate: allergies, current medications, past family history, past medical history, past social history, past surgical history, and problem list.    Objective   Vital Signs:   /72 (BP Location: Left arm, Patient Position: Sitting, Cuff Size: Large Adult)   Pulse 66   Temp 97.1 °F (36.2 °C) (Temporal)   Ht 175.3 cm (69.02\")   Wt 129 kg (284 lb 12.8 oz)   SpO2 98%   BMI 42.04 kg/m²     BP Readings from Last 3 Encounters:   09/25/23 107/72   05/24/23 120/78 "   04/10/23 115/79     Wt Readings from Last 3 Encounters:   09/25/23 129 kg (284 lb 12.8 oz)   05/24/23 131 kg (289 lb 9.6 oz)   04/10/23 135 kg (296 lb 9.6 oz)     Physical Exam   Appearance: No acute distress, well-nourished  Head: normocephalic, atraumatic  Eyes: extraocular movements intact, no scleral icterus, no conjunctival injection  Ears, Nose, and Throat: external ears normal, nares patent, moist mucous membranes  Cardiovascular: regular rate and rhythm. no murmurs, rubs, or gallops. no edema  Respiratory: breathing comfortably, symmetric chest rise, clear to auscultation bilaterally. No wheezes, rales, or rhonchi.  Neuro: alert and oriented to time, place, and person. Normal gait  Psych: normal mood and affect     Result Review :   The following data was reviewed by: Len Nichols Jr, MD on 09/25/2023:  Common labs          11/15/2022    10:45 3/16/2023    15:03 4/3/2023    13:48   Common Labs   Glucose 88  143  122    BUN 12  15  18    Creatinine 1.21  1.32  1.28    Sodium 137  141  140    Potassium 4.1  4.0  4.3    Chloride 102  104  106    Calcium 9.5  9.9  9.2    Albumin 4.20      Total Bilirubin 0.5      Alkaline Phosphatase 56      AST (SGOT) 16      ALT (SGPT) 14      WBC 8.60   8.15    Hemoglobin 16.5   16.7    Hematocrit 48.7   48.5    Platelets 291   291    Total Cholesterol 184      Triglycerides 289      HDL Cholesterol 25      LDL Cholesterol  109      Hemoglobin A1C 6.00          Lab Results   Component Value Date    INR 1.00 04/03/2023          Assessment and Plan    Diagnoses and all orders for this visit:    1. Impaired fasting glucose (Primary)  Comments:  recheck. cont current med regimen  Orders:  -     Hemoglobin A1c  -     Microalbumin / Creatinine Urine Ratio - Urine, Clean Catch    2. Chronic HFrEF (heart failure with reduced ejection fraction)  Comments:  euvolemic today. cont current med regimen  Orders:  -     proBNP    3. Hyperlipidemia LDL goal <70  Comments:  cont  statin and zetia.  Orders:  -     Lipid Panel    4. Paroxysmal atrial fibrillation  Comments:  rate controlled. cont BB and xarelto    5. Primary hypertension  Comments:  well controlled.  Orders:  -     CBC & Differential  -     Comprehensive Metabolic Panel    6. S/P coronary artery stent placement  Comments:  cont statin, plavix, and BB.  Overview:   Acute anterior/anterolateral STEMI 7/21    1. Successful emergent PCI to the culprit proximal-mid LAD using Xience Bridgette 3.5/23 and 3.25/28 mm LULY, post-dilated with a NC Trek 3.75/15 mm balloon up to 20 chantal.  Pre-procedure 100% occlusion with significant thrombus present and ROSEMARIE-0 flow, post-procedure 0% residual stenosis with ROSEMARIE-3 flow.  Type C lesion.  No evidence of complications.  2. Residual moderate non-obstructive disease in the circumflex and RCA, as detailed above.  3. Moderately reduced left ventricular systolic function with an estimated ejection fraction of 35% and severe mid-distal anterior/apical hypokinesis.  4. Normal LVEDP.        7. Need for pneumococcal vaccination  -     Pneumococcal Conjugate Vaccine 20-Valent All    8. Need for influenza vaccination  -     Fluzone (or Fluarix & Flulaval for VFC) >6mos    9. Vitamin D deficiency  -     Vitamin D,25-Hydroxy    10. Tobacco abuse  -      CT Chest Low Dose Cancer Screening WO; Future    11. Colon cancer screening  Comments:  encouraged to complete cologuard        Medications Discontinued During This Encounter   Medication Reason    aspirin 81 MG EC tablet *Therapy completed        Follow Up   Return in about 6 months (around 3/25/2024) for Recheck, HTN.  Patient was given instructions and counseling regarding his condition or for health maintenance advice. Please see specific information pulled into the AVS if appropriate.       Len Nichols Jr, MD  09/25/23  12:10 EDT

## 2023-09-26 PROBLEM — E11.65 TYPE 2 DIABETES MELLITUS WITH HYPERGLYCEMIA, WITHOUT LONG-TERM CURRENT USE OF INSULIN: Status: ACTIVE | Noted: 2023-09-26

## 2023-09-26 LAB
BASOPHILS # BLD AUTO: 0.09 10*3/MM3 (ref 0–0.2)
BASOPHILS NFR BLD AUTO: 1 % (ref 0–1.5)
DEPRECATED RDW RBC AUTO: 42.1 FL (ref 37–54)
EOSINOPHIL # BLD AUTO: 0.25 10*3/MM3 (ref 0–0.4)
EOSINOPHIL NFR BLD AUTO: 2.8 % (ref 0.3–6.2)
ERYTHROCYTE [DISTWIDTH] IN BLOOD BY AUTOMATED COUNT: 13.6 % (ref 12.3–15.4)
HCT VFR BLD AUTO: 48.7 % (ref 37.5–51)
HGB BLD-MCNC: 16.8 G/DL (ref 13–17.7)
IMM GRANULOCYTES # BLD AUTO: 0.02 10*3/MM3 (ref 0–0.05)
IMM GRANULOCYTES NFR BLD AUTO: 0.2 % (ref 0–0.5)
LYMPHOCYTES # BLD AUTO: 2.23 10*3/MM3 (ref 0.7–3.1)
LYMPHOCYTES NFR BLD AUTO: 24.7 % (ref 19.6–45.3)
MCH RBC QN AUTO: 29.7 PG (ref 26.6–33)
MCHC RBC AUTO-ENTMCNC: 34.5 G/DL (ref 31.5–35.7)
MCV RBC AUTO: 86.2 FL (ref 79–97)
MONOCYTES # BLD AUTO: 0.87 10*3/MM3 (ref 0.1–0.9)
MONOCYTES NFR BLD AUTO: 9.6 % (ref 5–12)
NEUTROPHILS NFR BLD AUTO: 5.57 10*3/MM3 (ref 1.7–7)
NEUTROPHILS NFR BLD AUTO: 61.7 % (ref 42.7–76)
NRBC BLD AUTO-RTO: 0 /100 WBC (ref 0–0.2)
PLATELET # BLD AUTO: 281 10*3/MM3 (ref 140–450)
PMV BLD AUTO: 11.1 FL (ref 6–12)
RBC # BLD AUTO: 5.65 10*6/MM3 (ref 4.14–5.8)
WBC NRBC COR # BLD: 9.03 10*3/MM3 (ref 3.4–10.8)

## 2023-09-27 ENCOUNTER — TELEPHONE (OUTPATIENT)
Dept: INTERNAL MEDICINE | Facility: CLINIC | Age: 53
End: 2023-09-27
Payer: OTHER GOVERNMENT

## 2023-09-27 NOTE — TELEPHONE ENCOUNTER
Called patient no answer left      OKAY FOR HUB TO READ/ADVISE       LDL at goal to reduce risk of cardiovascular events.     HgbA1c with decent control. Patient now meets criteria for being diabetic however.     Elevated triglycerides, which are the storage form of sugar - recommend lower carbohydrate/sugar intake.     HDL low - this is protective cholesterol and generally like the number to be >50. encourage exercise to increase level.

## 2023-09-27 NOTE — TELEPHONE ENCOUNTER
Has further questions  name:MORE EASTON  Relationship:WIFE  Best Callback Number: [phone]     HUB PROVIDED THE RELAY MESSAGE FROM THE OFFICE    PATIENT HAS FURTHER QUESTIONS AND WOULD LIKE A CALL BACK AT THE FOLLOWING PHONE NUMBER [phone]    PATIENTS WIFE IS STATING SHE WILL NOT BE AVAILABLE UNTIL AFTER 4:30P,M A DETAILED MESSAGE CAN  BE LEFT.     WHAT IS THE NEXT STEP IN DIABETIC CARE GOING FORWARD?  DOES PATIENT NEED FOLLOW UP APPOINTMENT FOR THE DIABETIC CARE?

## 2023-09-27 NOTE — TELEPHONE ENCOUNTER
----- Message from Len Nichols Jr., MD sent at 9/26/2023  6:38 AM EDT -----  LDL at goal to reduce risk of cardiovascular events.     HgbA1c with decent control. Patient now meets criteria for being diabetic however.     Elevated triglycerides, which are the storage form of sugar - recommend lower carbohydrate/sugar intake.     HDL low - this is protective cholesterol and generally like the number to be >50. encourage exercise to increase level.

## 2023-09-29 NOTE — TELEPHONE ENCOUNTER
Patient numbers did not change a whole lot from previous but increase enough to meet criteria. Would just focus on low carb, high protein foods at this time. Give heart history, Patient could potentially benefit from medication such as jardiance. Can schedule appointment to discuss more in depth.

## 2023-10-02 NOTE — TELEPHONE ENCOUNTER
RELAY MESSAGE GIVEN TO PATIENT. PATIENT EXPRESSED UNDERSTANDING AND INFORMED ME THAT HE IS ALREADY TAKING JARDIANCE.

## 2023-10-02 NOTE — TELEPHONE ENCOUNTER
Attempted to call patient, left vm       OK FOR HUB TO RELAY:  Patient numbers did not change a whole lot from previous but increase enough to meet criteria. Would just focus on low carb, high protein foods at this time. Give heart history, Patient could potentially benefit from medication such as jardiance. Can schedule appointment to discuss more in depth.

## 2023-10-19 ENCOUNTER — OFFICE VISIT (OUTPATIENT)
Dept: CARDIOLOGY | Facility: CLINIC | Age: 53
End: 2023-10-19
Payer: OTHER GOVERNMENT

## 2023-10-19 VITALS
HEIGHT: 69 IN | SYSTOLIC BLOOD PRESSURE: 120 MMHG | BODY MASS INDEX: 41.47 KG/M2 | DIASTOLIC BLOOD PRESSURE: 85 MMHG | WEIGHT: 280 LBS | HEART RATE: 68 BPM

## 2023-10-19 DIAGNOSIS — E78.5 HYPERLIPIDEMIA LDL GOAL <70: ICD-10-CM

## 2023-10-19 DIAGNOSIS — Z95.5 S/P CORONARY ARTERY STENT PLACEMENT: Primary | ICD-10-CM

## 2023-10-19 DIAGNOSIS — I10 PRIMARY HYPERTENSION: ICD-10-CM

## 2023-10-19 DIAGNOSIS — I48.0 PAROXYSMAL ATRIAL FIBRILLATION: ICD-10-CM

## 2023-10-19 DIAGNOSIS — I50.22 CHRONIC HFREF (HEART FAILURE WITH REDUCED EJECTION FRACTION): ICD-10-CM

## 2023-10-19 DIAGNOSIS — Z95.810 ICD (IMPLANTABLE CARDIOVERTER-DEFIBRILLATOR) IN PLACE: ICD-10-CM

## 2023-10-19 NOTE — PROGRESS NOTES
"Chief Complaint  Follow-up, Chronic systolic CHF, Hypertension, and Hyperlipidemia    Subjective    Patient doing well symptomatically denies any anginal chest pain.  He denies not had any problems with increased weight gain or change in his breathing capacity.  Past Medical History:   Diagnosis Date    Chronic kidney disease     NO DIALYSIS    Chronic systolic CHF 08/09/2021 07/28/2021 Heart Cath: Moderately reduced left ventricular systolic function with an estimated ejection fraction of 35%     Head injury     NO RESIDUAL    History of (STEMI) due to occlusion of mid portion of left anterior descending (LAD) coronary artery 07/28/2021    STENT PLACED    Hyperlipidemia     Hypertension     ICD (implantable cardioverter-defibrillator) \"MRI COMPATIBLE S-ICD [employer] A219     MRI safe cardiac ICD in situ     Paroxysmal atrial fibrillation 01/15/2019    PFO (patent foramen ovale) 06/2014    STEMI involving left anterior descending coronary artery 07/2021    also previous PCI in 2014    Stroke     NO RESIDUAL         Current Outpatient Medications:     amLODIPine (NORVASC) 10 MG tablet, Take 1 tablet by mouth Daily., Disp: 90 tablet, Rfl: 3    carvedilol (COREG) 25 MG tablet, Take 1 tablet by mouth 2 (Two) Times a Day With Meals., Disp: 180 tablet, Rfl: 3    clopidogrel (PLAVIX) 75 MG tablet, Take 1 tablet by mouth Daily. PT REPORTS HE WAS NOT TOLD TO HOLD THIS PER METZ STATES HIS LAST DOSE WAS 3/31/23 A.M. MESSAGE SENT TO SALMA MORA RN 3/31/23 1620, Disp: 90 tablet, Rfl: 3    empagliflozin (JARDIANCE) 10 MG tablet tablet, Take 1 tablet by mouth Daily., Disp: 90 tablet, Rfl: 3    ezetimibe (ZETIA) 10 MG tablet, Take 1 tablet by mouth Daily for 90 days., Disp: 90 tablet, Rfl: 3    furosemide (LASIX) 40 MG tablet, Take 1 tablet by mouth 2 (Two) Times a Day., Disp: 180 tablet, Rfl: 3    rivaroxaban (XARELTO) 20 MG tablet, Take 1 tablet by mouth Daily for 90 days. Patient to hold until follow up for wound check in " "on 4/10 and then can resume Q24H dosing (Patient taking differently: Take 1 tablet by mouth Daily. Taking again), Disp: 90 tablet, Rfl: 3    rosuvastatin (CRESTOR) 40 MG tablet, Take 1 tablet by mouth Daily., Disp: 90 tablet, Rfl: 3    sacubitril-valsartan (Entresto) 24-26 MG tablet, Take 1 tablet by mouth 2 (Two) Times a Day., Disp: 180 tablet, Rfl: 3    There are no discontinued medications.  Allergies   Allergen Reactions    Eliquis [Apixaban] Paresthesia        Social History     Tobacco Use    Smoking status: Every Day     Packs/day: 1.00     Years: 35.00     Additional pack years: 0.00     Total pack years: 35.00     Types: Cigarettes     Start date: 1985    Smokeless tobacco: Never    Tobacco comments:     CURRENT EVERYDAY SMOKER, SMOKED 31 OR MORE YEARS       INST PER ANESTHESIA PROTOCOL   Vaping Use    Vaping Use: Former    Substances: Nicotine, Flavoring    Devices: RefDoctorCble tank   Substance Use Topics    Alcohol use: Yes     Comment: DRINKS monthly, LESS THAN 1 DRINK PER DAY, HAS BEEN DRINKING FOR 31 OR MORE YEARS    Drug use: Never       Family History   Problem Relation Age of Onset    Cancer Mother     Heart disease Father     Diabetes Father     Malig Hyperthermia Neg Hx         Objective     /85   Pulse 68   Ht 175.3 cm (69.02\")   Wt 127 kg (280 lb)   BMI 41.32 kg/m²       Physical Exam    General Appearance:   no acute distress  Alert and oriented x3  HENT:   lips not cyanotic  Atraumatic  Neck:  No jvd   supple  Respiratory:  no respiratory distress  normal breath sounds  no rales  Cardiovascular:  Regular rate and rhythm  no S3, no S4   no murmur  no rub  Extremities  No cyanosis  lower extremity edema: none    Skin:   warm, dry  No rashes      Result Review :     proBNP   Date Value Ref Range Status   09/25/2023 311.0 0.0 - 900.0 pg/mL Final     CMP          3/16/2023    15:03 4/3/2023    13:48 9/25/2023    10:41   CMP   Glucose 143  122  119    BUN 15  18  12    Creatinine 1.32  1.28 " " 1.38    EGFR 65.3  67.8  61.9    Sodium 141  140  138    Potassium 4.0  4.3  4.3    Chloride 104  106  103    Calcium 9.9  9.2  9.7    Total Protein   7.5    Albumin   4.2    Globulin   3.3    Total Bilirubin   0.5    Alkaline Phosphatase   63    AST (SGOT)   26    ALT (SGPT)   28    Albumin/Globulin Ratio   1.3    BUN/Creatinine Ratio 11.4  14.1  8.7    Anion Gap 12.6  10.8  12.0      CBC w/diff          11/15/2022    10:45 4/3/2023    13:48 9/25/2023    10:41   CBC w/Diff   WBC 8.60  8.15  9.03    RBC 5.68  5.69  5.65    Hemoglobin 16.5  16.7  16.8    Hematocrit 48.7  48.5  48.7    MCV 85.7  85.2  86.2    MCH 29.0  29.3  29.7    MCHC 33.9  34.4  34.5    RDW 13.6  14.2  13.6    Platelets 291  291  281    Neutrophil Rel % 61.2   61.7    Immature Granulocyte Rel % 0.2   0.2    Lymphocyte Rel % 23.7   24.7    Monocyte Rel % 11.3   9.6    Eosinophil Rel % 2.7   2.8    Basophil Rel % 0.9   1.0       Lab Results   Component Value Date    TSH 1.400 06/07/2021      No results found for: \"FREET4\"   No results found for: \"DDIMERQUANT\"  Magnesium   Date Value Ref Range Status   07/28/2021 1.8 1.6 - 2.6 mg/dL Final      No results found for: \"DIGOXIN\"   Lab Results   Component Value Date    TROPONINT <0.01 01/03/2019           Lipid Panel          11/15/2022    10:45 9/25/2023    10:41   Lipid Panel   Total Cholesterol 184  99    Triglycerides 289  281    HDL Cholesterol 25  25    VLDL Cholesterol 50  42    LDL Cholesterol  109  32    LDL/HDL Ratio 4.05  0.71      Lab Results   Component Value Date    POCTROP 0.04 07/28/2021       Results for orders placed in visit on 10/11/22    Adult Transthoracic Echo Complete W/ Cont if Necessary Per Protocol    Interpretation Summary    Estimated left ventricular EF = 30% Left ventricular systolic function is moderately decreased.    The left ventricular cavity is moderately dilated.    Left ventricular wall thickness is consistent with moderate concentric hypertrophy.    No obvious " thrombus seen in the apex although it does appear to be more hypokinetic and there are some trabeculations of the myocardium which make it impossible to fully exclude                 Diagnoses and all orders for this visit:    1. S/P coronary artery stent placement (Primary)  Assessment & Plan:  Patient is doing well no ongoing angina continue with chronic Plavix 75 mg daily        2. Chronic HFrEF (heart failure with reduced ejection fraction)  Assessment & Plan:  Patient with no change in clinical status and no increased edema on exam.  Encourage continued exercise and weight loss.  Patient can continue with Coreg 25 twice daily, Entresto 24-26 twice daily, and Jardiance 10 daily dosing      3. Primary hypertension    4. Paroxysmal atrial fibrillation  Assessment & Plan:  Continue Xarelto 20 daily      5. ICD (implantable cardioverter-defibrillator) in place  Assessment & Plan:  Device working normally on remote interrogation schedule an office check next visit      6. Hyperlipidemia LDL goal <70  Assessment & Plan:  LDL goal on Crestor 40 nightly              Follow Up     Return in about 6 months (around 4/19/2024) for EKG with F/U, Pacemaker w/f/u, Follow with Adrienne Clemons.          Patient was given instructions and counseling regarding his condition or for health maintenance advice. Please see specific information pulled into the AVS if appropriate.

## 2023-10-19 NOTE — ASSESSMENT & PLAN NOTE
Patient with no change in clinical status and no increased edema on exam.  Encourage continued exercise and weight loss.  Patient can continue with Coreg 25 twice daily, Entresto 24-26 twice daily, and Jardiance 10 daily dosing

## 2023-10-24 ENCOUNTER — HOSPITAL ENCOUNTER (OUTPATIENT)
Dept: CT IMAGING | Facility: HOSPITAL | Age: 53
Discharge: HOME | End: 2023-10-24
Admitting: INTERNAL MEDICINE
Payer: OTHER GOVERNMENT

## 2023-10-24 DIAGNOSIS — Z72.0 TOBACCO ABUSE: ICD-10-CM

## 2023-10-24 PROCEDURE — 71271 CT THORAX LUNG CANCER SCR C-: CPT

## 2024-01-22 ENCOUNTER — TELEPHONE (OUTPATIENT)
Dept: INTERNAL MEDICINE | Facility: CLINIC | Age: 54
End: 2024-01-22
Payer: OTHER GOVERNMENT

## 2024-01-22 NOTE — TELEPHONE ENCOUNTER
Caller: MORE EASTON    Relationship: Emergency Contact    Best call back number: [phone]    Requested Prescriptions:   ALL MEDICATIONS     Pharmacy where request should be sent: [facility] [place] - [address] [phone] [facility] [phone]      Last office visit with prescribing clinician: 9/25/2023   Last telemedicine visit with prescribing clinician: Visit date not found   Next office visit with prescribing clinician: 3/25/2024     Additional details provided by patient: ON SOME MEDICATIONS, PATIENT HAS A WEEK AND HALF LEFT. PATIENT IS NEEDING ALL MEDICATIONS TO BE FILLED.     Does the patient have less than a 3 day supply:  [] Yes  [x] No    Would you like a call back once the refill request has been completed: [] Yes [x] No    If the office needs to give you a call back, can they leave a voicemail: [] Yes [x] No    Nav Ruffin Rep   01/22/24 16:17 EST

## 2024-01-23 RX ORDER — EZETIMIBE 10 MG/1
10 TABLET ORAL DAILY
Qty: 90 TABLET | Refills: 3 | Status: SHIPPED | OUTPATIENT
Start: 2024-01-23

## 2024-01-23 RX ORDER — AMLODIPINE BESYLATE 10 MG/1
10 TABLET ORAL DAILY
Qty: 90 TABLET | Refills: 3 | Status: SHIPPED | OUTPATIENT
Start: 2024-01-23

## 2024-01-23 RX ORDER — SACUBITRIL AND VALSARTAN 24; 26 MG/1; MG/1
1 TABLET, FILM COATED ORAL 2 TIMES DAILY
Qty: 180 TABLET | Refills: 3 | Status: SHIPPED | OUTPATIENT
Start: 2024-01-23

## 2024-01-23 RX ORDER — CARVEDILOL 25 MG/1
25 TABLET ORAL 2 TIMES DAILY WITH MEALS
Qty: 180 TABLET | Refills: 3 | Status: SHIPPED | OUTPATIENT
Start: 2024-01-23

## 2024-01-23 RX ORDER — ROSUVASTATIN CALCIUM 40 MG/1
40 TABLET, COATED ORAL DAILY
Qty: 90 TABLET | Refills: 3 | Status: SHIPPED | OUTPATIENT
Start: 2024-01-23

## 2024-01-23 RX ORDER — FUROSEMIDE 40 MG/1
40 TABLET ORAL 2 TIMES DAILY
Qty: 180 TABLET | Refills: 3 | Status: SHIPPED | OUTPATIENT
Start: 2024-01-23

## 2024-03-20 ENCOUNTER — TELEPHONE (OUTPATIENT)
Dept: INTERNAL MEDICINE | Facility: CLINIC | Age: 54
End: 2024-03-20
Payer: OTHER GOVERNMENT

## 2024-03-20 DIAGNOSIS — I50.22 CHRONIC SYSTOLIC CHF (CONGESTIVE HEART FAILURE): ICD-10-CM

## 2024-03-20 NOTE — TELEPHONE ENCOUNTER
Caller: MORE EASTON    Relationship: Emergency Contact    Best call back number: [phone]    Requested Prescriptions:   Requested Prescriptions     Pending Prescriptions Disp Refills    clopidogrel (PLAVIX) 75 MG tablet       Sig: Take 1 tablet by mouth Daily. PT REPORTS HE WAS NOT TOLD TO HOLD THIS PER METZ STATES HIS LAST DOSE WAS 3/31/23 A.M. MESSAGE SENT TO SALMA MORA RN 3/31/23 1246    empagliflozin (JARDIANCE) 10 MG tablet tablet 90 tablet 3     Sig: Take 1 tablet by mouth Daily.    rivaroxaban (XARELTO) 20 MG tablet       Sig: Take 1 tablet by mouth Daily for 90 days. Patient to hold until follow up for wound check in on 4/10 and then can resume Q24H dosing        Pharmacy where request should be sent: [facility] [phone] [place], [place] [phone] [facility]     Last office visit with prescribing clinician: 9/25/2023   Last telemedicine visit with prescribing clinician: Visit date not found   Next office visit with prescribing clinician: 3/25/2024         Does the patient have less than a 3 day supply:  [] Yes  [x] No        Nav Coronado Rep   03/20/24 16:30 EDT

## 2024-03-21 RX ORDER — CLOPIDOGREL BISULFATE 75 MG/1
75 TABLET ORAL DAILY
Qty: 90 TABLET | Refills: 3 | Status: SHIPPED | OUTPATIENT
Start: 2024-03-21

## 2024-03-25 ENCOUNTER — OFFICE VISIT (OUTPATIENT)
Dept: INTERNAL MEDICINE | Facility: CLINIC | Age: 54
End: 2024-03-25
Payer: OTHER GOVERNMENT

## 2024-03-25 VITALS
DIASTOLIC BLOOD PRESSURE: 76 MMHG | TEMPERATURE: 97.2 F | WEIGHT: 276.6 LBS | OXYGEN SATURATION: 96 % | BODY MASS INDEX: 40.97 KG/M2 | HEIGHT: 69 IN | HEART RATE: 67 BPM | SYSTOLIC BLOOD PRESSURE: 110 MMHG

## 2024-03-25 DIAGNOSIS — I10 PRIMARY HYPERTENSION: ICD-10-CM

## 2024-03-25 DIAGNOSIS — I48.0 PAROXYSMAL ATRIAL FIBRILLATION: ICD-10-CM

## 2024-03-25 DIAGNOSIS — Z00.00 ANNUAL PHYSICAL EXAM: Primary | ICD-10-CM

## 2024-03-25 DIAGNOSIS — Z23 NEED FOR COVID-19 VACCINE: ICD-10-CM

## 2024-03-25 DIAGNOSIS — E11.65 TYPE 2 DIABETES MELLITUS WITH HYPERGLYCEMIA, WITHOUT LONG-TERM CURRENT USE OF INSULIN: ICD-10-CM

## 2024-03-25 DIAGNOSIS — E78.5 HYPERLIPIDEMIA LDL GOAL <70: ICD-10-CM

## 2024-03-25 LAB
ALBUMIN SERPL-MCNC: 4.3 G/DL (ref 3.5–5.2)
ALBUMIN UR-MCNC: <1.2 MG/DL
ALBUMIN/GLOB SERPL: 1.4 G/DL
ALP SERPL-CCNC: 71 U/L (ref 39–117)
ALT SERPL W P-5'-P-CCNC: 20 U/L (ref 1–41)
ANION GAP SERPL CALCULATED.3IONS-SCNC: 15 MMOL/L (ref 5–15)
AST SERPL-CCNC: 19 U/L (ref 1–40)
BASOPHILS # BLD AUTO: 0.1 10*3/MM3 (ref 0–0.2)
BASOPHILS NFR BLD AUTO: 1.1 % (ref 0–1.5)
BILIRUB SERPL-MCNC: 0.4 MG/DL (ref 0–1.2)
BUN SERPL-MCNC: 13 MG/DL (ref 6–20)
BUN/CREAT SERPL: 9.2 (ref 7–25)
CALCIUM SPEC-SCNC: 9.2 MG/DL (ref 8.6–10.5)
CHLORIDE SERPL-SCNC: 106 MMOL/L (ref 98–107)
CHOLEST SERPL-MCNC: 115 MG/DL (ref 0–200)
CO2 SERPL-SCNC: 21 MMOL/L (ref 22–29)
CREAT SERPL-MCNC: 1.41 MG/DL (ref 0.76–1.27)
CREAT UR-MCNC: 39.8 MG/DL
DEPRECATED RDW RBC AUTO: 42.9 FL (ref 37–54)
EGFRCR SERPLBLD CKD-EPI 2021: 60 ML/MIN/1.73
EOSINOPHIL # BLD AUTO: 0.22 10*3/MM3 (ref 0–0.4)
EOSINOPHIL NFR BLD AUTO: 2.4 % (ref 0.3–6.2)
ERYTHROCYTE [DISTWIDTH] IN BLOOD BY AUTOMATED COUNT: 13.3 % (ref 12.3–15.4)
GLOBULIN UR ELPH-MCNC: 3 GM/DL
GLUCOSE SERPL-MCNC: 92 MG/DL (ref 65–99)
HBA1C MFR BLD: 5.9 % (ref 4.8–5.6)
HCT VFR BLD AUTO: 51.3 % (ref 37.5–51)
HDLC SERPL-MCNC: 31 MG/DL (ref 40–60)
HGB BLD-MCNC: 17.3 G/DL (ref 13–17.7)
IMM GRANULOCYTES # BLD AUTO: 0.03 10*3/MM3 (ref 0–0.05)
IMM GRANULOCYTES NFR BLD AUTO: 0.3 % (ref 0–0.5)
LDLC SERPL CALC-MCNC: 59 MG/DL (ref 0–100)
LDLC/HDLC SERPL: 1.79 {RATIO}
LYMPHOCYTES # BLD AUTO: 2.23 10*3/MM3 (ref 0.7–3.1)
LYMPHOCYTES NFR BLD AUTO: 24.7 % (ref 19.6–45.3)
MCH RBC QN AUTO: 29.5 PG (ref 26.6–33)
MCHC RBC AUTO-ENTMCNC: 33.7 G/DL (ref 31.5–35.7)
MCV RBC AUTO: 87.4 FL (ref 79–97)
MICROALBUMIN/CREAT UR: NORMAL MG/G{CREAT}
MONOCYTES # BLD AUTO: 0.92 10*3/MM3 (ref 0.1–0.9)
MONOCYTES NFR BLD AUTO: 10.2 % (ref 5–12)
NEUTROPHILS NFR BLD AUTO: 5.52 10*3/MM3 (ref 1.7–7)
NEUTROPHILS NFR BLD AUTO: 61.3 % (ref 42.7–76)
NRBC BLD AUTO-RTO: 0 /100 WBC (ref 0–0.2)
PLATELET # BLD AUTO: 281 10*3/MM3 (ref 140–450)
PMV BLD AUTO: 11.1 FL (ref 6–12)
POTASSIUM SERPL-SCNC: 4.1 MMOL/L (ref 3.5–5.2)
PROT SERPL-MCNC: 7.3 G/DL (ref 6–8.5)
RBC # BLD AUTO: 5.87 10*6/MM3 (ref 4.14–5.8)
SODIUM SERPL-SCNC: 142 MMOL/L (ref 136–145)
TRIGL SERPL-MCNC: 142 MG/DL (ref 0–150)
VLDLC SERPL-MCNC: 25 MG/DL (ref 5–40)
WBC NRBC COR # BLD AUTO: 9.02 10*3/MM3 (ref 3.4–10.8)

## 2024-03-25 PROCEDURE — 80061 LIPID PANEL: CPT | Performed by: INTERNAL MEDICINE

## 2024-03-25 PROCEDURE — 82043 UR ALBUMIN QUANTITATIVE: CPT | Performed by: INTERNAL MEDICINE

## 2024-03-25 PROCEDURE — 80053 COMPREHEN METABOLIC PANEL: CPT | Performed by: INTERNAL MEDICINE

## 2024-03-25 PROCEDURE — 83036 HEMOGLOBIN GLYCOSYLATED A1C: CPT | Performed by: INTERNAL MEDICINE

## 2024-03-25 PROCEDURE — 85025 COMPLETE CBC W/AUTO DIFF WBC: CPT | Performed by: INTERNAL MEDICINE

## 2024-03-25 PROCEDURE — 82570 ASSAY OF URINE CREATININE: CPT | Performed by: INTERNAL MEDICINE

## 2024-03-25 NOTE — PROGRESS NOTES
"Chief Complaint  Annual Exam    Subjective          Yefri Mcgee presents to [facility]  History of Present Illness  Patient without complaints. Patient reports feeling well. Patient is due for labs.     Current Outpatient Medications   Medication Instructions    amLODIPine (NORVASC) 10 mg, Oral, Daily    carvedilol (COREG) 25 mg, Oral, 2 Times Daily With Meals    clopidogrel (PLAVIX) 75 mg, Oral, Daily, PT REPORTS HE WAS NOT TOLD TO HOLD THIS PER METZ STATES HIS LAST DOSE WAS 3/31/23 A.M. MESSAGE SENT TO SALMA MORA RN 3/31/23 1620    empagliflozin (JARDIANCE) 10 mg, Oral, Daily    ezetimibe (ZETIA) 10 mg, Oral, Daily    furosemide (LASIX) 40 mg, Oral, 2 Times Daily    rivaroxaban (XARELTO) 20 mg, Oral, Daily    rosuvastatin (CRESTOR) 40 mg, Oral, Daily    sacubitril-valsartan (Entresto) 24-26 MG tablet 1 tablet, Oral, 2 Times Daily       The following portions of the patient's history were reviewed and updated as appropriate: allergies, current medications, past family history, past medical history, past social history, past surgical history, and problem list.    Objective   Vital Signs:   /76 (BP Location: Left arm)   Pulse 67   Temp 97.2 °F (36.2 °C) (Temporal)   Ht 175.3 cm (69\")   Wt 125 kg (276 lb 9.6 oz)   SpO2 96%   BMI 40.85 kg/m²     BP Readings from Last 3 Encounters:   03/25/24 110/76   10/19/23 120/85   09/25/23 107/72     Wt Readings from Last 3 Encounters:   03/25/24 125 kg (276 lb 9.6 oz)   10/19/23 127 kg (280 lb)   09/25/23 129 kg (284 lb 12.8 oz)         Physical Exam   Appearance: No acute distress, well-nourished  Head: normocephalic, atraumatic  Eyes: extraocular movements intact, no scleral icterus, no conjunctival injection  Ears, Nose, and Throat: external ears normal, nares patent, moist mucous membranes  Cardiovascular: regular rate and rhythm. no murmurs, rubs, or gallops. no edema  Respiratory: breathing comfortably, symmetric " chest rise, clear to auscultation bilaterally. No wheezes, rales, or rhonchi.  Neuro: alert and oriented to time, place, and person. Normal gait  Psych: normal mood and affect       Result Review :   The following data was reviewed by: Len Nichols Jr, MD on 03/25/2024:  Common labs          4/3/2023    13:48 9/25/2023    10:41   Common Labs   Glucose 122  119    BUN 18  12    Creatinine 1.28  1.38    Sodium 140  138    Potassium 4.3  4.3    Chloride 106  103    Calcium 9.2  9.7    Albumin  4.2    Total Bilirubin  0.5    Alkaline Phosphatase  63    AST (SGOT)  26    ALT (SGPT)  28    WBC 8.15  9.03    Hemoglobin 16.7  16.8    Hematocrit 48.5  48.7    Platelets 291  281    Total Cholesterol  99    Triglycerides  281    HDL Cholesterol  25    LDL Cholesterol   32    Hemoglobin A1C  6.50          Lab Results   Component Value Date    INR 1.00 04/03/2023          Assessment and Plan    Diagnoses and all orders for this visit:    1. Annual physical exam (Primary)    2. Hyperlipidemia LDL goal <70  -     Lipid panel    3. Type 2 diabetes mellitus with hyperglycemia, without long-term current use of insulin  Comments:  encouraged by weight loss. check labs.  Orders:  -     Hemoglobin A1c  -     Microalbumin / Creatinine Urine Ratio - Urine, Clean Catch    4. Paroxysmal atrial fibrillation  Comments:  rate controlled. cont xarelto.    5. Primary hypertension  Comments:  well controlled on regimen  Orders:  -     CBC w AUTO Differential  -     Comprehensive metabolic panel    6. Need for COVID-19 vaccine  -     COVID-19 F23 (Pfizer) 12yrs+ (COMIRNATY)      Advised on diet, physical activity, etc      There are no discontinued medications.       Follow Up   Return in about 6 months (around 9/25/2024) for DM, HTN.  Patient was given instructions and counseling regarding his condition or for health maintenance advice. Please see specific information pulled into the AVS if appropriate.       Len Nichols  MD Rashaun  03/25/24  13:07 EDT

## 2024-03-26 ENCOUNTER — TELEPHONE (OUTPATIENT)
Dept: INTERNAL MEDICINE | Facility: CLINIC | Age: 54
End: 2024-03-26
Payer: OTHER GOVERNMENT

## 2024-03-26 DIAGNOSIS — N17.9 AKI (ACUTE KIDNEY INJURY): Primary | ICD-10-CM

## 2024-03-26 NOTE — TELEPHONE ENCOUNTER
Attempted to reach patient. Left vm     OKAY FOR HUB TO RELAY:     Good control HgbA1c     HDL low - this is protective cholesterol and generally like the number to be >50. encourage exercise to increase level.     Kidney function reduced. Continue jardiance and entresto to protect kidneys. Will obtain renal ultrasound and refer to kidney specialist.

## 2024-03-26 NOTE — TELEPHONE ENCOUNTER
----- Message from Len Nichols Jr., MD sent at 3/26/2024  8:23 AM EDT -----  Good control HgbA1c    HDL low - this is protective cholesterol and generally like the number to be >50. encourage exercise to increase level.     Kidney function reduced. Continue jardiance and entresto to protect kidneys. Will obtain renal ultrasound and refer to kidney specialist.

## 2024-03-26 NOTE — TELEPHONE ENCOUNTER
Name: MORE EASTON    Relationship: Emergency Contact    Best Callback Number:     [phone]       HUB PROVIDED THE RELAY MESSAGE FROM THE OFFICE   PATIENT VOICED UNDERSTANDING AND HAS NO FURTHER QUESTIONS AT THIS TIME    ADDITIONAL INFORMATION:     Event Note

## 2024-04-10 ENCOUNTER — TELEPHONE (OUTPATIENT)
Dept: INTERNAL MEDICINE | Facility: CLINIC | Age: 54
End: 2024-04-10

## 2024-04-11 RX ORDER — CLOPIDOGREL BISULFATE 75 MG/1
75 TABLET ORAL DAILY
Qty: 90 TABLET | Refills: 3 | Status: SHIPPED | OUTPATIENT
Start: 2024-04-11

## 2024-04-12 ENCOUNTER — HOSPITAL ENCOUNTER (OUTPATIENT)
Dept: ULTRASOUND IMAGING | Facility: HOSPITAL | Age: 54
Discharge: HOME | End: 2024-04-12
Admitting: INTERNAL MEDICINE
Payer: OTHER GOVERNMENT

## 2024-04-12 DIAGNOSIS — N17.9 AKI (ACUTE KIDNEY INJURY): ICD-10-CM

## 2024-04-12 PROCEDURE — 76775 US EXAM ABDO BACK WALL LIM: CPT

## 2024-04-23 ENCOUNTER — CLINICAL SUPPORT NO REQUIREMENTS (OUTPATIENT)
Dept: CARDIOLOGY | Facility: CLINIC | Age: 54
End: 2024-04-23
Payer: OTHER GOVERNMENT

## 2024-04-23 ENCOUNTER — OFFICE VISIT (OUTPATIENT)
Dept: CARDIOLOGY | Facility: CLINIC | Age: 54
End: 2024-04-23
Payer: OTHER GOVERNMENT

## 2024-04-23 VITALS
HEART RATE: 69 BPM | DIASTOLIC BLOOD PRESSURE: 82 MMHG | SYSTOLIC BLOOD PRESSURE: 132 MMHG | HEIGHT: 69 IN | BODY MASS INDEX: 41.62 KG/M2 | WEIGHT: 281 LBS

## 2024-04-23 DIAGNOSIS — E78.5 HYPERLIPIDEMIA LDL GOAL <70: ICD-10-CM

## 2024-04-23 DIAGNOSIS — I50.22 CHRONIC HFREF (HEART FAILURE WITH REDUCED EJECTION FRACTION): ICD-10-CM

## 2024-04-23 DIAGNOSIS — Z95.810 ICD (IMPLANTABLE CARDIOVERTER-DEFIBRILLATOR) IN PLACE: ICD-10-CM

## 2024-04-23 DIAGNOSIS — Z95.810 ICD (IMPLANTABLE CARDIOVERTER-DEFIBRILLATOR) IN PLACE: Primary | ICD-10-CM

## 2024-04-23 DIAGNOSIS — Z95.5 S/P CORONARY ARTERY STENT PLACEMENT: ICD-10-CM

## 2024-04-23 DIAGNOSIS — I48.0 PAROXYSMAL ATRIAL FIBRILLATION: ICD-10-CM

## 2024-04-23 DIAGNOSIS — I10 PRIMARY HYPERTENSION: ICD-10-CM

## 2024-04-23 DIAGNOSIS — I48.0 PAROXYSMAL ATRIAL FIBRILLATION: Primary | ICD-10-CM

## 2024-04-23 RX ORDER — NITROGLYCERIN 0.4 MG/1
TABLET SUBLINGUAL
Qty: 90 TABLET | Refills: 6 | Status: SHIPPED | OUTPATIENT
Start: 2024-04-23

## 2024-04-23 RX ORDER — FUROSEMIDE 80 MG
80 TABLET ORAL DAILY
Qty: 90 TABLET | Refills: 1 | Status: SHIPPED | OUTPATIENT
Start: 2024-04-23

## 2024-04-23 NOTE — PROGRESS NOTES
"Chief Complaint  Follow-up, Pacemaker Check, S/P coronary artery stent placement, Hypertension, and Atrial Fibrillation    Subjective            History of Present Illness  Yefri Mcgee is a 53-year-old male patient who presents to the office today for follow-up.  He has atrial fibrillation, status post coronary stenting, chronic HFrEF, presence of subcutaneous ICD, hypertension, and hyperlipidemia.  He is compliant with medication.  He denies any chest pain, shortness of breath, lightheadedness/dizziness, palpitations, or edema.    PMH  Past Medical History:   Diagnosis Date    Chronic kidney disease     NO DIALYSIS    Chronic systolic CHF 08/09/2021 07/28/2021 Heart Cath: Moderately reduced left ventricular systolic function with an estimated ejection fraction of 35%     Head injury     NO RESIDUAL    History of (STEMI) due to occlusion of mid portion of left anterior descending (LAD) coronary artery 07/28/2021    STENT PLACED    Hyperlipidemia     Hypertension     ICD (implantable cardioverter-defibrillator) \"MRI COMPATIBLE S-ICD [employer] A219     MRI safe cardiac ICD in situ     Paroxysmal atrial fibrillation 01/15/2019    PFO (patent foramen ovale) 06/2014    STEMI involving left anterior descending coronary artery 07/2021    also previous PCI in 2014    Stroke     NO RESIDUAL         ALLERGY  Allergies   Allergen Reactions    Eliquis [Apixaban] Paresthesia          SURGICALHX  Past Surgical History:   Procedure Laterality Date    CARDIAC CATHETERIZATION N/A 7/28/2021    Procedure: Left Heart Cath;  Surgeon: Enrique Jack MD;  Location: [facility] CATH INVASIVE LOCATION;  Service: Cardiovascular;  Laterality: N/A;    CARDIAC ELECTROPHYSIOLOGY PROCEDURE N/A 4/4/2023    Procedure: ICD SUBCUTANEOUS new;  Surgeon: Leo Alonso MD;  Location: [facility] CATH INVASIVE LOCATION;  Service: Cardiovascular;  Laterality: N/A;  HOLD PLAVIX 5 DAYS PRIOR  HOLD XARELTO 2 DAYS PRIOR    CARDIAC SURGERY      HEART " "BYPASS     CAROTID STENT            SOC  Social History     Socioeconomic History    Marital status:    Tobacco Use    Smoking status: Every Day     Current packs/day: 1.00     Average packs/day: 1 pack/day for 39.3 years (39.3 ttl pk-yrs)     Types: Cigarettes     Start date: 1985    Smokeless tobacco: Never    Tobacco comments:     CURRENT EVERYDAY SMOKER, SMOKED 31 OR MORE YEARS       INST PER ANESTHESIA PROTOCOL   Vaping Use    Vaping status: Former    Substances: Nicotine, Flavoring    Devices: Refillable tank   Substance and Sexual Activity    Alcohol use: Yes     Comment: DRINKS monthly, LESS THAN 1 DRINK PER DAY, HAS BEEN DRINKING FOR 31 OR MORE YEARS    Drug use: Never    Sexual activity: Defer         FAMHX  Family History   Problem Relation Age of Onset    Cancer Mother     Heart disease Father     Diabetes Father     Malig Hyperthermia Neg Hx           MEDSIGONLY  Current Outpatient Medications on File Prior to Visit   Medication Sig    amLODIPine (NORVASC) 10 MG tablet Take 1 tablet by mouth Daily.    carvedilol (COREG) 25 MG tablet Take 1 tablet by mouth 2 (Two) Times a Day With Meals.    clopidogrel (PLAVIX) 75 MG tablet Take 1 tablet by mouth Daily.    empagliflozin (JARDIANCE) 10 MG tablet tablet Take 1 tablet by mouth Daily.    ezetimibe (ZETIA) 10 MG tablet Take 1 tablet by mouth Daily.    rivaroxaban (XARELTO) 20 MG tablet Take 1 tablet by mouth Daily.    rosuvastatin (CRESTOR) 40 MG tablet Take 1 tablet by mouth Daily.    sacubitril-valsartan (Entresto) 24-26 MG tablet Take 1 tablet by mouth 2 (Two) Times a Day.     No current facility-administered medications on file prior to visit.         Objective   /82   Pulse 69   Ht 175.3 cm (69\")   Wt 127 kg (281 lb)   BMI 41.50 kg/m²       Physical Exam  Constitutional:       Appearance: He is obese.   HENT:      Head: Normocephalic.   Neck:      Vascular: No carotid bruit.   Cardiovascular:      Rate and Rhythm: Normal rate and regular " "rhythm.      Pulses: Normal pulses.      Heart sounds: Normal heart sounds. No murmur heard.  Pulmonary:      Effort: Pulmonary effort is normal.      Breath sounds: Normal breath sounds.   Musculoskeletal:      Cervical back: Neck supple.      Right lower leg: No edema.      Left lower leg: No edema.   Skin:     General: Skin is dry.   Neurological:      Mental Status: He is alert and oriented to person, place, and time.   Psychiatric:         Behavior: Behavior normal.       Result Review :   The following data was reviewed by: ROYCE Frankel on 04/23/2024:  proBNP   Date Value Ref Range Status   09/25/2023 311.0 0.0 - 900.0 pg/mL Final     CMP          3/25/2024    12:08   CMP   Glucose 92    BUN 13    Creatinine 1.41    EGFR 60.0    Sodium 142    Potassium 4.1    Chloride 106    Calcium 9.2    Total Protein 7.3    Albumin 4.3    Globulin 3.0    Total Bilirubin 0.4    Alkaline Phosphatase 71    AST (SGOT) 19    ALT (SGPT) 20    Albumin/Globulin Ratio 1.4    BUN/Creatinine Ratio 9.2    Anion Gap 15.0      CBC w/diff          3/25/2024    12:08   CBC w/Diff   WBC 9.02    RBC 5.87    Hemoglobin 17.3    Hematocrit 51.3    MCV 87.4    MCH 29.5    MCHC 33.7    RDW 13.3    Platelets 281    Neutrophil Rel % 61.3    Immature Granulocyte Rel % 0.3    Lymphocyte Rel % 24.7    Monocyte Rel % 10.2    Eosinophil Rel % 2.4    Basophil Rel % 1.1       Lab Results   Component Value Date    TSH 1.400 06/07/2021      No results found for: \"FREET4\"   No results found for: \"DDIMERQUANT\"  Magnesium   Date Value Ref Range Status   07/28/2021 1.8 1.6 - 2.6 mg/dL Final      No results found for: \"DIGOXIN\"   Lab Results   Component Value Date    TROPONINT <0.01 01/03/2019           Lipid Panel          3/25/2024    12:08   Lipid Panel   Total Cholesterol 115    Triglycerides 142    HDL Cholesterol 31    VLDL Cholesterol 25    LDL Cholesterol  59    LDL/HDL Ratio 1.79        Results for orders placed in visit on 10/11/22    Adult " Transthoracic Echo Complete W/ Cont if Necessary Per Protocol    Interpretation Summary    Estimated left ventricular EF = 30% Left ventricular systolic function is moderately decreased.    The left ventricular cavity is moderately dilated.    Left ventricular wall thickness is consistent with moderate concentric hypertrophy.    No obvious thrombus seen in the apex although it does appear to be more hypokinetic and there are some trabeculations of the myocardium which make it impossible to fully exclude           Assessment and Plan    Diagnoses and all orders for this visit:    1. Paroxysmal atrial fibrillation (Primary)  Device check today shows no recurrent episodes of atrial fibrillation.  Continue carvedilol 25 mg twice daily.  Continue Xarelto for CVA prevention.    2. S/P coronary artery stent placement  He denies any anginal symptoms, continue Plavix 75 mg daily.    3. Chronic HFrEF (heart failure with reduced ejection fraction)  Symptomatically stable at this time and euvolemic on exam today, continue carvedilol 25 mg twice daily, Jardiance 10 mg daily, Lasix 80 mg daily, and Entresto 24-26 mg twice daily.    4. Subcutaneous ICD  Device was interrogated in office today with no issues.  He has no complaints today.    5. Primary hypertension  Currently controlled and without adverse effects from medication, continue current medical therapy.    6. Hyperlipidemia LDL goal <70  Last lipid panel was 3/25/2024 with LDL 59 which is within goal range, continue rosuvastatin 40 mg daily and ezetimibe 10 mg daily.      Other orders  -     furosemide (LASIX) 80 MG tablet; Take 1 tablet by mouth Daily.  Dispense: 90 tablet; Refill: 1  -     nitroglycerin (NITROSTAT) 0.4 MG SL tablet; 1 under the tongue as needed for angina, may repeat q5mins for up three doses  Dispense: 90 tablet; Refill: 6            Follow Up   Return in about 6 months (around 10/23/2024) for Follow up with Dr Alonso.    Patient was given instructions  and counseling regarding his condition or for health maintenance advice. Please see specific information pulled into the AVS if appropriate.     Yefri Mcgee  reports that he has been smoking cigarettes. He started smoking about 39 years ago. He has a 39.3 pack-year smoking history. He has never used smokeless tobacco. I have educated him on the risk of diseases from using tobacco products such as cancer, COPD, and heart disease.     I advised him to quit and he is not willing to quit.    I spent 3  minutes counseling the patient.           Adrienne Clemons, APRN  04/26/24  14:49 EDT    Dictated Utilizing Dragon Dictation

## 2024-06-11 NOTE — PROGRESS NOTES
"   06/11/24 1100   Appointment Info   Signing Clinician's Name / Credentials (OT) Nadia Micaela, OTD, OTR/L   Living Environment   People in Home spouse;facility resident   Current Living Arrangements assisted living   Home Accessibility no concerns   Transportation Anticipated family or friend will provide   Living Environment Comments Per daughter, pt lives in an assisted living facility with . Elevators present.   Self-Care   Usual Activity Tolerance fair   Current Activity Tolerance poor   Equipment Currently Used at Home grab bar, tub/shower;walker, rolling;wheelchair, manual;shower chair   Fall history within last six months yes   Number of times patient has fallen within last six months 1   Activity/Exercise/Self-Care Comment Per daughter, pt was modified IND with 4WW for mobility around apartment and toileting. Family would assist with bathing.  unable to provide assistance. W/c for ambulating longer distances and has assist from [facility] staff. Prior to most recent admission, was not utilizing additional services outside of meals at [facility], per family, there is very responsive staff that are available 24/7 with  added services available.   General Information   Onset of Illness/Injury or Date of Surgery 06/09/24   Referring Physician Li Gr PA-C   Additional Occupational Profile Info/Pertinent History of Current Problem \"Idalia Bob is a 82 yo F with pmhx of  COPD, HFrEF (EF 10-15%), PsA upper lobe pneumonia (12/2023), HTN, cardiogenic shock during 5/2024 admission and dementia, CKD, HTN, depression, anxiety who was admitted on 6/9/2024 for presumed PNA after being found to be febrile at nursing facility. On admission temperature of 101, tachycardic in 110s that has since resolved, SBPs 80s - 120s -- has stabilized in the 90s and currently getting 500 ml of NS, on 2 L of O2 (which she wears at night time and with activity at baseline). WBC 15.1, procal 0.15, lactate 1.4. Fluvid " Tolerated Session Well.  See session report.  Completed Phase II Cardiac Rehab   "negative, UA without LE or nitrites. CXR on admission with patchy opacities in the right lung base concerning for infection, trace left pleural effusion. No localizing infectious symptoms. Patient was admitted to hospital medicine for escalataion of care\"   Existing Precautions/Restrictions cardiac;fall   Left Upper Extremity (Weight-bearing Status) full weight-bearing (FWB)   Right Upper Extremity (Weight-bearing Status) full weight-bearing (FWB)   Left Lower Extremity (Weight-bearing Status) full weight-bearing (FWB)   Right Lower Extremity (Weight-bearing Status) full weight-bearing (FWB)   Cognitive Status Examination   Cognitive Status Comments Pt with hx of dementia, very forgetful but follows commands   Visual Perception   Visual Impairment/Limitations corrective lenses full-time   Sensory   Sensory Quick Adds sensation intact   Range of Motion Comprehensive   Comment, General Range of Motion BUE WFL   Strength Comprehensive (MMT)   Comment, General Manual Muscle Testing (MMT) Assessment generally weak   Coordination   Coordination Comments dec coordination, req assist with small containers. per pt, facility opens all small food containers   Bed Mobility   Comment (Bed Mobility) not witnessed   Transfers   Transfer Comments CGA w/ FWW   Bathing Assessment/Intervention   Comment, (Bathing) min A per clinical judgement   Upper Body Dressing Assessment/Training   Comment, (Upper Body Dressing) setup per clinical judgement   Lower Body Dressing Assessment/Training   Comment, (Lower Body Dressing) min A per clinical judgement   Clinical Impression   Criteria for Skilled Therapeutic Interventions Met (OT) Yes, treatment indicated   OT Diagnosis dec IND with I/ADLs, dec memory, dec strength   OT Problem List-Impairments impacting ADL problems related to;activity tolerance impaired;balance;mobility;strength   Assessment of Occupational Performance 3-5 Performance Deficits   Identified Performance Deficits bathing, " dressing, toileting, g/h,   Planned Therapy Interventions (OT) ADL retraining;IADL retraining;balance training;bed mobility training;strengthening;transfer training;home program guidelines;progressive activity/exercise;risk factor education   Clinical Decision Making Complexity (OT) problem focused assessment/low complexity   Risk & Benefits of therapy have been explained evaluation/treatment results reviewed;care plan/treatment goals reviewed;risks/benefits reviewed;current/potential barriers reviewed;participants voiced agreement with care plan;participants included;patient   OT Total Evaluation Time   OT Eval, Low Complexity Minutes (30893) 5   OT Goals   Therapy Frequency (OT) 5 times/week   OT Predicted Duration/Target Date for Goal Attainment 06/25/24   OT: Hygiene/Grooming modified independent   OT: Upper Body Dressing Modified independent   OT: Lower Body Dressing Modified independent   OT: Lower Body Bathing Supervision/stand-by assist   OT: Toilet Transfer/Toileting Modified independent   OT Discharge Planning   OT Plan amb to bathroom for toileting, progress act raza/standing raza, standing ADLs, bathing/shower transfers   OT Discharge Recommendation (DC Rec) home with assist;home with home care occupational therapy   OT Rationale for DC Rec pt below baseline with dec act raza & strength, anticipate will be safe to d/c home with assist from family as needed. may benefit from inc A from [facility] at baseline due to cognition.   OT Brief overview of current status CGA w/ FWW ambulation & STS's   Total Session Time   Total Session Time (sum of timed and untimed services) 5

## 2024-06-14 ENCOUNTER — TRANSCRIBE ORDERS (OUTPATIENT)
Dept: ADMINISTRATIVE | Facility: HOSPITAL | Age: 54
End: 2024-06-14
Payer: OTHER GOVERNMENT

## 2024-06-14 ENCOUNTER — LAB (OUTPATIENT)
Dept: LAB | Facility: HOSPITAL | Age: 54
End: 2024-06-14
Payer: OTHER GOVERNMENT

## 2024-06-14 DIAGNOSIS — E78.5 HYPERLIPIDEMIA, UNSPECIFIED HYPERLIPIDEMIA TYPE: ICD-10-CM

## 2024-06-14 DIAGNOSIS — I48.91 ATRIAL FIBRILLATION, UNSPECIFIED TYPE: ICD-10-CM

## 2024-06-14 DIAGNOSIS — I10 HYPERTENSION, UNSPECIFIED TYPE: Primary | ICD-10-CM

## 2024-06-14 DIAGNOSIS — I10 HYPERTENSION, UNSPECIFIED TYPE: ICD-10-CM

## 2024-06-14 LAB
25(OH)D3 SERPL-MCNC: 25 NG/ML (ref 30–100)
ALBUMIN SERPL-MCNC: 4 G/DL (ref 3.5–5.2)
ANION GAP SERPL CALCULATED.3IONS-SCNC: 13.3 MMOL/L (ref 5–15)
BACTERIA UR QL AUTO: NORMAL /HPF
BASOPHILS # BLD AUTO: 0.09 10*3/MM3 (ref 0–0.2)
BASOPHILS NFR BLD AUTO: 0.9 % (ref 0–1.5)
BILIRUB UR QL STRIP: NEGATIVE
BUN SERPL-MCNC: 18 MG/DL (ref 6–20)
BUN/CREAT SERPL: 12.5 (ref 7–25)
CALCIUM SPEC-SCNC: 9.3 MG/DL (ref 8.6–10.5)
CHLORIDE SERPL-SCNC: 105 MMOL/L (ref 98–107)
CLARITY UR: CLEAR
CO2 SERPL-SCNC: 24.7 MMOL/L (ref 22–29)
COLOR UR: YELLOW
CREAT SERPL-MCNC: 1.44 MG/DL (ref 0.76–1.27)
CREAT UR-MCNC: 53.3 MG/DL
DEPRECATED RDW RBC AUTO: 41.3 FL (ref 37–54)
EGFRCR SERPLBLD CKD-EPI 2021: 58.5 ML/MIN/1.73
EOSINOPHIL # BLD AUTO: 0.19 10*3/MM3 (ref 0–0.4)
EOSINOPHIL NFR BLD AUTO: 2 % (ref 0.3–6.2)
ERYTHROCYTE [DISTWIDTH] IN BLOOD BY AUTOMATED COUNT: 13 % (ref 12.3–15.4)
GLUCOSE SERPL-MCNC: 105 MG/DL (ref 65–99)
GLUCOSE UR STRIP-MCNC: ABNORMAL MG/DL
HCT VFR BLD AUTO: 50.9 % (ref 37.5–51)
HGB BLD-MCNC: 16.7 G/DL (ref 13–17.7)
HGB UR QL STRIP.AUTO: NEGATIVE
HYALINE CASTS UR QL AUTO: NORMAL /LPF
IMM GRANULOCYTES # BLD AUTO: 0.02 10*3/MM3 (ref 0–0.05)
IMM GRANULOCYTES NFR BLD AUTO: 0.2 % (ref 0–0.5)
KETONES UR QL STRIP: NEGATIVE
LEUKOCYTE ESTERASE UR QL STRIP.AUTO: NEGATIVE
LYMPHOCYTES # BLD AUTO: 3.15 10*3/MM3 (ref 0.7–3.1)
LYMPHOCYTES NFR BLD AUTO: 32.7 % (ref 19.6–45.3)
MCH RBC QN AUTO: 28.9 PG (ref 26.6–33)
MCHC RBC AUTO-ENTMCNC: 32.8 G/DL (ref 31.5–35.7)
MCV RBC AUTO: 88.2 FL (ref 79–97)
MONOCYTES # BLD AUTO: 1.08 10*3/MM3 (ref 0.1–0.9)
MONOCYTES NFR BLD AUTO: 11.2 % (ref 5–12)
NEUTROPHILS NFR BLD AUTO: 5.1 10*3/MM3 (ref 1.7–7)
NEUTROPHILS NFR BLD AUTO: 53 % (ref 42.7–76)
NITRITE UR QL STRIP: NEGATIVE
NRBC BLD AUTO-RTO: 0 /100 WBC (ref 0–0.2)
PH UR STRIP.AUTO: 6.5 [PH] (ref 5–8)
PHOSPHATE SERPL-MCNC: 4 MG/DL (ref 2.5–4.5)
PLATELET # BLD AUTO: 294 10*3/MM3 (ref 140–450)
PMV BLD AUTO: 10 FL (ref 6–12)
POTASSIUM SERPL-SCNC: 4 MMOL/L (ref 3.5–5.2)
PROT ?TM UR-MCNC: 7.6 MG/DL
PROT UR QL STRIP: NEGATIVE
PROT/CREAT UR: 0.14 MG/G{CREAT}
PTH-INTACT SERPL-MCNC: 50.8 PG/ML (ref 15–65)
RBC # BLD AUTO: 5.77 10*6/MM3 (ref 4.14–5.8)
RBC # UR STRIP: NORMAL /HPF
REF LAB TEST METHOD: NORMAL
SODIUM SERPL-SCNC: 143 MMOL/L (ref 136–145)
SP GR UR STRIP: 1.02 (ref 1–1.03)
SQUAMOUS #/AREA URNS HPF: NORMAL /HPF
UROBILINOGEN UR QL STRIP: ABNORMAL
WBC # UR STRIP: NORMAL /HPF
WBC NRBC COR # BLD AUTO: 9.63 10*3/MM3 (ref 3.4–10.8)

## 2024-06-14 PROCEDURE — 83970 ASSAY OF PARATHORMONE: CPT

## 2024-06-14 PROCEDURE — 36415 COLL VENOUS BLD VENIPUNCTURE: CPT

## 2024-06-14 PROCEDURE — 81001 URINALYSIS AUTO W/SCOPE: CPT

## 2024-06-14 PROCEDURE — 80069 RENAL FUNCTION PANEL: CPT

## 2024-06-14 PROCEDURE — 85025 COMPLETE CBC W/AUTO DIFF WBC: CPT

## 2024-06-14 PROCEDURE — 82570 ASSAY OF URINE CREATININE: CPT

## 2024-06-14 PROCEDURE — 84156 ASSAY OF PROTEIN URINE: CPT

## 2024-06-14 PROCEDURE — 82306 VITAMIN D 25 HYDROXY: CPT

## 2024-09-30 ENCOUNTER — OFFICE VISIT (OUTPATIENT)
Dept: INTERNAL MEDICINE | Facility: CLINIC | Age: 54
End: 2024-09-30
Payer: OTHER GOVERNMENT

## 2024-09-30 VITALS
WEIGHT: 277.6 LBS | BODY MASS INDEX: 41.12 KG/M2 | TEMPERATURE: 97.5 F | HEART RATE: 74 BPM | HEIGHT: 69 IN | OXYGEN SATURATION: 94 % | DIASTOLIC BLOOD PRESSURE: 88 MMHG | SYSTOLIC BLOOD PRESSURE: 126 MMHG

## 2024-09-30 DIAGNOSIS — I50.22 CHRONIC HFREF (HEART FAILURE WITH REDUCED EJECTION FRACTION): ICD-10-CM

## 2024-09-30 DIAGNOSIS — Z23 NEED FOR INFLUENZA VACCINATION: ICD-10-CM

## 2024-09-30 DIAGNOSIS — N18.2 CHRONIC RENAL IMPAIRMENT, STAGE 2 (MILD): ICD-10-CM

## 2024-09-30 DIAGNOSIS — I10 PRIMARY HYPERTENSION: ICD-10-CM

## 2024-09-30 DIAGNOSIS — E11.65 TYPE 2 DIABETES MELLITUS WITH HYPERGLYCEMIA, WITHOUT LONG-TERM CURRENT USE OF INSULIN: Primary | ICD-10-CM

## 2024-09-30 DIAGNOSIS — E78.5 HYPERLIPIDEMIA LDL GOAL <70: ICD-10-CM

## 2024-09-30 DIAGNOSIS — I48.0 PAROXYSMAL ATRIAL FIBRILLATION: ICD-10-CM

## 2024-09-30 LAB
25(OH)D3 SERPL-MCNC: 28.3 NG/ML (ref 30–100)
ALBUMIN SERPL-MCNC: 4.6 G/DL (ref 3.5–5.2)
ALBUMIN/GLOB SERPL: 1.4 G/DL
ALP SERPL-CCNC: 74 U/L (ref 39–117)
ALT SERPL W P-5'-P-CCNC: 23 U/L (ref 1–41)
ANION GAP SERPL CALCULATED.3IONS-SCNC: 12 MMOL/L (ref 5–15)
AST SERPL-CCNC: 26 U/L (ref 1–40)
BILIRUB SERPL-MCNC: 0.4 MG/DL (ref 0–1.2)
BUN SERPL-MCNC: 14 MG/DL (ref 6–20)
BUN/CREAT SERPL: 10.5 (ref 7–25)
CALCIUM SPEC-SCNC: 10.1 MG/DL (ref 8.6–10.5)
CHLORIDE SERPL-SCNC: 104 MMOL/L (ref 98–107)
CHOLEST SERPL-MCNC: 211 MG/DL (ref 0–200)
CO2 SERPL-SCNC: 22 MMOL/L (ref 22–29)
CREAT SERPL-MCNC: 1.33 MG/DL (ref 0.76–1.27)
EGFRCR SERPLBLD CKD-EPI 2021: 64.3 ML/MIN/1.73
GLOBULIN UR ELPH-MCNC: 3.4 GM/DL
GLUCOSE SERPL-MCNC: 106 MG/DL (ref 65–99)
HBA1C MFR BLD: 6.3 % (ref 4.8–5.6)
HDLC SERPL-MCNC: 34 MG/DL (ref 40–60)
LDLC SERPL CALC-MCNC: 121 MG/DL (ref 0–100)
LDLC/HDLC SERPL: 3.33 {RATIO}
NT-PROBNP SERPL-MCNC: 1196 PG/ML (ref 0–900)
POTASSIUM SERPL-SCNC: 4.1 MMOL/L (ref 3.5–5.2)
PROT SERPL-MCNC: 8 G/DL (ref 6–8.5)
PTH-INTACT SERPL-MCNC: 36.9 PG/ML (ref 15–65)
SODIUM SERPL-SCNC: 138 MMOL/L (ref 136–145)
TRIGL SERPL-MCNC: 319 MG/DL (ref 0–150)
VLDLC SERPL-MCNC: 56 MG/DL (ref 5–40)

## 2024-09-30 PROCEDURE — 90656 IIV3 VACC NO PRSV 0.5 ML IM: CPT | Performed by: INTERNAL MEDICINE

## 2024-09-30 PROCEDURE — 99214 OFFICE O/P EST MOD 30 MIN: CPT | Performed by: INTERNAL MEDICINE

## 2024-09-30 PROCEDURE — 90471 IMMUNIZATION ADMIN: CPT | Performed by: INTERNAL MEDICINE

## 2024-09-30 PROCEDURE — 83880 ASSAY OF NATRIURETIC PEPTIDE: CPT | Performed by: INTERNAL MEDICINE

## 2024-09-30 PROCEDURE — 80053 COMPREHEN METABOLIC PANEL: CPT | Performed by: INTERNAL MEDICINE

## 2024-09-30 PROCEDURE — 83970 ASSAY OF PARATHORMONE: CPT | Performed by: INTERNAL MEDICINE

## 2024-09-30 PROCEDURE — 80061 LIPID PANEL: CPT | Performed by: INTERNAL MEDICINE

## 2024-09-30 PROCEDURE — 82306 VITAMIN D 25 HYDROXY: CPT | Performed by: INTERNAL MEDICINE

## 2024-09-30 PROCEDURE — 83036 HEMOGLOBIN GLYCOSYLATED A1C: CPT | Performed by: INTERNAL MEDICINE

## 2024-09-30 NOTE — PROGRESS NOTES
"Chief Complaint  impaired fasting glucose (Follow up ) and Back Pain (It comes and goes )    Subjective          Yefri Mcege presents to [facility]  History of Present Illness  Hypertension- patient denies headaches, dizziness, chest pain. Patient without home Blood Pressure readings  CHF- patient has adjusted lasix to 80mg once daily instead of 40mg BID.   Hyperlipidemia- doing well on statin and zetia.   CKD- follow-up with nephrology and having monitoring.      Current Outpatient Medications   Medication Instructions    amLODIPine (NORVASC) 10 mg, Oral, Daily    carvedilol (COREG) 25 mg, Oral, 2 Times Daily With Meals    clopidogrel (PLAVIX) 75 mg, Oral, Daily    empagliflozin (JARDIANCE) 10 mg, Oral, Daily    ezetimibe (ZETIA) 10 mg, Oral, Daily    furosemide (LASIX) 80 mg, Oral, Daily    nitroglycerin (NITROSTAT) 0.4 MG SL tablet 1 under the tongue as needed for angina, may repeat q5mins for up three doses    rivaroxaban (XARELTO) 20 mg, Oral, Daily    rosuvastatin (CRESTOR) 40 mg, Oral, Daily    sacubitril-valsartan (Entresto) 24-26 MG tablet 1 tablet, Oral, 2 Times Daily       The following portions of the patient's history were reviewed and updated as appropriate: allergies, current medications, past family history, past medical history, past social history, past surgical history, and problem list.    Objective   Vital Signs:   /88 (BP Location: Left arm)   Pulse 74   Temp 97.5 °F (36.4 °C) (Temporal)   Ht 175.3 cm (69\")   Wt 126 kg (277 lb 9.6 oz)   SpO2 94%   BMI 40.99 kg/m²     BP Readings from Last 3 Encounters:   09/30/24 126/88   04/23/24 132/82   03/25/24 110/76     Wt Readings from Last 3 Encounters:   09/30/24 126 kg (277 lb 9.6 oz)   04/23/24 127 kg (281 lb)   03/25/24 125 kg (276 lb 9.6 oz)         Physical Exam   Appearance: No acute distress, well-nourished  Head: normocephalic, atraumatic  Eyes: extraocular movements intact, no " scleral icterus, no conjunctival injection  Ears, Nose, and Throat: external ears normal, nares patent, moist mucous membranes  Cardiovascular: regular rate and rhythm. no murmurs, rubs, or gallops. no edema  Respiratory: breathing comfortably, symmetric chest rise, clear to auscultation bilaterally. No wheezes, rales, or rhonchi.  Neuro: alert and oriented to time, place, and person. Normal gait  Psych: normal mood and affect       Result Review :   The following data was reviewed by: Len Nichols Jr, MD on 03/25/2024:  Common labs          3/25/2024    12:08 6/14/2024    17:34   Common Labs   Glucose 92  105    BUN 13  18    Creatinine 1.41  1.44    Sodium 142  143    Potassium 4.1  4.0    Chloride 106  105    Calcium 9.2  9.3    Albumin 4.3  4.0    Total Bilirubin 0.4     Alkaline Phosphatase 71     AST (SGOT) 19     ALT (SGPT) 20     WBC 9.02  9.63    Hemoglobin 17.3  16.7    Hematocrit 51.3  50.9    Platelets 281  294    Total Cholesterol 115     Triglycerides 142     HDL Cholesterol 31     LDL Cholesterol  59     Hemoglobin A1C 5.90     Microalbumin, Urine <1.2           Lab Results   Component Value Date    INR 1.00 04/03/2023    BILIRUBINUR Negative 06/14/2024          Assessment and Plan    Diagnoses and all orders for this visit:    1. Type 2 diabetes mellitus with hyperglycemia, without long-term current use of insulin (Primary)  Comments:  check labs. encouraged by weight loss over past 1 year  Orders:  -     Hemoglobin A1c    2. Hyperlipidemia LDL goal <70  -     Lipid Panel    3. Primary hypertension  Comments:  well controlled on regimen. goal <130/80  Orders:  -     Comprehensive Metabolic Panel  -     Cancel: CBC & Differential    4. Paroxysmal atrial fibrillation  Comments:  rate controlled. on xarelto    5. Chronic HFrEF (heart failure with reduced ejection fraction)  Comments:  euvolemic. discussed use of MRA.  Orders:  -     proBNP    6. Chronic renal impairment, stage 2  (mild)  Comments:  monitor renal function. f/u with neprho. consider finenerone.  Orders:  -     Vitamin D,25-Hydroxy  -     PTH, Intact    7. Need for influenza vaccination  -     Fluzone >6mos ([id number])      There are no discontinued medications.       Follow Up   Return in about 4 months (around 1/30/2025).  Patient was given instructions and counseling regarding his condition or for health maintenance advice. Please see specific information pulled into the AVS if appropriate.       Len Nichols Jr, MD  09/30/24  15:20 EDT

## 2024-10-01 ENCOUNTER — TELEPHONE (OUTPATIENT)
Dept: INTERNAL MEDICINE | Facility: CLINIC | Age: 54
End: 2024-10-01
Payer: OTHER GOVERNMENT

## 2024-10-01 NOTE — TELEPHONE ENCOUNTER
Caller: [facility]    NO UPDATED BH VERBAL IN CHART    Relationship: Emergency Contact    Best call back number: [phone]    What was the call regarding: HUB UNABLE TO DISCLOSE RESULTS TO SPOUSE AS THERE IS NO UPDATED BH VERBAL IN CHART FOR PATIENT. WIFE GAVE NUMBER TO REACH PATIENT [phone].

## 2024-10-01 NOTE — TELEPHONE ENCOUNTER
Caller: [facility]    Relationship: Emergency Contact    Best call back number: [phone]    What is the best time to reach you: ANYTIME BEFORE 4PM, AFTER 4PM CALL CELL PHONE    Who are you requesting to speak with (clinical staff, provider,  specific staff member): CLINICAL        What was the call regarding: PATIENTS WIFE STATES THAT SHE WAS CALLING TO ASK QUESTIONS REGARDING THE PATIENTS LAB RESULTS    PATIENTS WIFE WOULD LIKE A CALL BACK TO DISCUSS

## 2024-10-01 NOTE — TELEPHONE ENCOUNTER
Tried to call patient back no answer left VM     OKAY FOR HUB TO READ/ADVISE       BNP higher than usual. Monitor breathing and swelling.     HgbA1c with good control.     Elevated triglycerides, which are the storage form of sugar - recommend lower carbohydrate/sugar intake.     HDL low - this is protective cholesterol and generally like the number to be >50. encourage exercise to increase level.     LDL is elevated - this has been associated with increased risk of cardiovascular disease including heart attacks and strokes. Would recommend low cholesterol diet to reduce. Ensure taking rosuvastatin     Vitamin D remains low and patient may continue supplementation

## 2024-11-06 ENCOUNTER — OFFICE VISIT (OUTPATIENT)
Dept: CARDIOLOGY | Facility: CLINIC | Age: 54
End: 2024-11-06
Payer: OTHER GOVERNMENT

## 2024-11-06 ENCOUNTER — CLINICAL SUPPORT NO REQUIREMENTS (OUTPATIENT)
Dept: CARDIOLOGY | Facility: CLINIC | Age: 54
End: 2024-11-06
Payer: OTHER GOVERNMENT

## 2024-11-06 VITALS
BODY MASS INDEX: 42.06 KG/M2 | HEART RATE: 75 BPM | DIASTOLIC BLOOD PRESSURE: 87 MMHG | SYSTOLIC BLOOD PRESSURE: 129 MMHG | HEIGHT: 69 IN | WEIGHT: 284 LBS

## 2024-11-06 DIAGNOSIS — I10 PRIMARY HYPERTENSION: Primary | ICD-10-CM

## 2024-11-06 DIAGNOSIS — E78.5 HYPERLIPIDEMIA LDL GOAL <70: ICD-10-CM

## 2024-11-06 DIAGNOSIS — Z95.5 S/P CORONARY ARTERY STENT PLACEMENT: ICD-10-CM

## 2024-11-06 DIAGNOSIS — I50.22 CHRONIC HFREF (HEART FAILURE WITH REDUCED EJECTION FRACTION): ICD-10-CM

## 2024-11-06 DIAGNOSIS — Z95.810 ICD (IMPLANTABLE CARDIOVERTER-DEFIBRILLATOR) IN PLACE: ICD-10-CM

## 2024-11-06 DIAGNOSIS — I50.22 CHRONIC HFREF (HEART FAILURE WITH REDUCED EJECTION FRACTION): Primary | ICD-10-CM

## 2024-11-06 RX ORDER — FUROSEMIDE 80 MG/1
80 TABLET ORAL DAILY
Qty: 90 TABLET | Refills: 3 | Status: SHIPPED | OUTPATIENT
Start: 2024-11-06

## 2024-11-06 NOTE — PROGRESS NOTES
"Chief Complaint  Follow-up, Congestive Heart Failure, and Atrial Fibrillation    Subjective    Patient is doing well denies any changes in his breathing no increased edema weight is up mildly about 3 pounds since last visit.  The patient denies any anginal chest discomfort issues.  Past Medical History:   Diagnosis Date    Chronic kidney disease     NO DIALYSIS    Chronic systolic CHF 08/09/2021 07/28/2021 Heart Cath: Moderately reduced left ventricular systolic function with an estimated ejection fraction of 35%     Head injury     NO RESIDUAL    History of (STEMI) due to occlusion of mid portion of left anterior descending (LAD) coronary artery 07/28/2021    STENT PLACED    Hyperlipidemia     Hypertension     ICD (implantable cardioverter-defibrillator) \"MRI COMPATIBLE S-ICD [employer] A219     MRI safe cardiac ICD in situ     Paroxysmal atrial fibrillation 01/15/2019    PFO (patent foramen ovale) 06/2014    STEMI involving left anterior descending coronary artery 07/2021    also previous PCI in 2014    Stroke     NO RESIDUAL         Current Outpatient Medications:     amLODIPine (NORVASC) 10 MG tablet, Take 1 tablet by mouth Daily., Disp: 90 tablet, Rfl: 3    carvedilol (COREG) 25 MG tablet, Take 1 tablet by mouth 2 (Two) Times a Day With Meals., Disp: 180 tablet, Rfl: 3    clopidogrel (PLAVIX) 75 MG tablet, Take 1 tablet by mouth Daily., Disp: 90 tablet, Rfl: 3    empagliflozin (JARDIANCE) 10 MG tablet tablet, Take 1 tablet by mouth Daily., Disp: 90 tablet, Rfl: 3    ezetimibe (ZETIA) 10 MG tablet, Take 1 tablet by mouth Daily., Disp: 90 tablet, Rfl: 3    furosemide (LASIX) 80 MG tablet, Take 1 tablet by mouth Daily., Disp: 90 tablet, Rfl: 1    nitroglycerin (NITROSTAT) 0.4 MG SL tablet, 1 under the tongue as needed for angina, may repeat q5mins for up three doses, Disp: 90 tablet, Rfl: 6    rivaroxaban (XARELTO) 20 MG tablet, Take 1 tablet by mouth Daily., Disp: 90 tablet, Rfl: 3    rosuvastatin " (CRESTOR) 40 MG tablet, Take 1 tablet by mouth Daily., Disp: 90 tablet, Rfl: 3    sacubitril-valsartan (Entresto) 24-26 MG tablet, Take 1 tablet by mouth 2 (Two) Times a Day., Disp: 180 tablet, Rfl: 3    There are no discontinued medications.  Allergies   Allergen Reactions    Eliquis [Apixaban] Paresthesia        Social History     Tobacco Use    Smoking status: Every Day     Current packs/day: 1.00     Average packs/day: 1 pack/day for 39.8 years (39.8 ttl pk-yrs)     Types: Cigarettes     Start date: 1985    Smokeless tobacco: Never    Tobacco comments:     CURRENT EVERYDAY SMOKER, SMOKED 31 OR MORE YEARS       INST PER ANESTHESIA PROTOCOL   Vaping Use    Vaping status: Former    Substances: Nicotine, Flavoring    Devices: RareCyteble tank   Substance Use Topics    Alcohol use: Yes     Comment: DRINKS monthly, LESS THAN 1 DRINK PER DAY, HAS BEEN DRINKING FOR 31 OR MORE YEARS    Drug use: Never       Family History   Problem Relation Age of Onset    Cancer Mother     Heart disease Father     Diabetes Father     Malig Hyperthermia Neg Hx         Objective     There were no vitals taken for this visit.      Physical Exam    General Appearance:   no acute distress  Alert and oriented x3  HENT:   lips not cyanotic  Atraumatic  Neck:  No jvd   supple  Respiratory:  no respiratory distress  normal breath sounds  no rales  Cardiovascular:  Regular rate and rhythm  no S3, no S4   no murmur  no rub  Extremities  No cyanosis  lower extremity edema: Trace  Skin:   warm, dry  No rashes      Result Review :     proBNP   Date Value Ref Range Status   09/30/2024 1,196.0 (H) 0.0 - 900.0 pg/mL Final     CMP          3/25/2024    12:08 6/14/2024    17:34 9/30/2024    15:11   CMP   Glucose 92  105  106    BUN 13  18  14    Creatinine 1.41  1.44  1.33    EGFR 60.0  58.5  64.3    Sodium 142  143  138    Potassium 4.1  4.0  4.1    Chloride 106  105  104    Calcium 9.2  9.3  10.1    Total Protein 7.3   8.0    Albumin 4.3  4.0  4.6   "  Globulin 3.0   3.4    Total Bilirubin 0.4   0.4    Alkaline Phosphatase 71   74    AST (SGOT) 19   26    ALT (SGPT) 20   23    Albumin/Globulin Ratio 1.4   1.4    BUN/Creatinine Ratio 9.2  12.5  10.5    Anion Gap 15.0  13.3  12.0      CBC w/diff          3/25/2024    12:08 6/14/2024    17:34   CBC w/Diff   WBC 9.02  9.63    RBC 5.87  5.77    Hemoglobin 17.3  16.7    Hematocrit 51.3  50.9    MCV 87.4  88.2    MCH 29.5  28.9    MCHC 33.7  32.8    RDW 13.3  13.0    Platelets 281  294    Neutrophil Rel % 61.3  53.0    Immature Granulocyte Rel % 0.3  0.2    Lymphocyte Rel % 24.7  32.7    Monocyte Rel % 10.2  11.2    Eosinophil Rel % 2.4  2.0    Basophil Rel % 1.1  0.9       Lab Results   Component Value Date    TSH 1.400 06/07/2021      No results found for: \"FREET4\"   No results found for: \"DDIMERQUANT\"  Magnesium   Date Value Ref Range Status   07/28/2021 1.8 1.6 - 2.6 mg/dL Final      No results found for: \"DIGOXIN\"   Lab Results   Component Value Date    TROPONINT <0.01 01/03/2019           Lipid Panel          3/25/2024    12:08 9/30/2024    15:11   Lipid Panel   Total Cholesterol 115  211    Triglycerides 142  319    HDL Cholesterol 31  34    VLDL Cholesterol 25  56    LDL Cholesterol  59  121    LDL/HDL Ratio 1.79  3.33      Lab Results   Component Value Date    POCTROP 0.04 07/28/2021       Results for orders placed in visit on 10/11/22    Adult Transthoracic Echo Complete W/ Cont if Necessary Per Protocol    Interpretation Summary    Estimated left ventricular EF = 30% Left ventricular systolic function is moderately decreased.    The left ventricular cavity is moderately dilated.    Left ventricular wall thickness is consistent with moderate concentric hypertrophy.    No obvious thrombus seen in the apex although it does appear to be more hypokinetic and there are some trabeculations of the myocardium which make it impossible to fully exclude      Subcutaneous ICD interrogated today device working normally " measured impedance 7o ohms          There are no diagnoses linked to this encounter.        Follow Up     No follow-ups on file.          Patient was given instructions and counseling regarding his condition or for health maintenance advice. Please see specific information pulled into the AVS if appropriate.

## 2024-11-06 NOTE — PROGRESS NOTES
Normal S-ICD Device Interrogation and Device Testing.  Normal evaluation of device function and lead measurements.  No optimization was needed of parameters or maximization of device longevity.  Patient is on automated Home Remote Monitoring.

## 2024-11-06 NOTE — ASSESSMENT & PLAN NOTE
Patient's LDL now markedly increased from March patient admits to not taking his medications as scheduled discussed with him the importance of compliance and the importance of cholesterol management in preventing future heart events such as MIs or strokes.  Discussed possible addition of Repatha he wanted to hold off and try to take his medications more consistently will repeat lipids LFTs next visit.

## 2024-11-06 NOTE — ASSESSMENT & PLAN NOTE
Device working normally on remote check continue when remote monitoring and office check next visit

## 2024-11-06 NOTE — ASSESSMENT & PLAN NOTE
Patient with stable class II-III symptoms no recent decompensation episodes continue with his Coreg 25 twice daily, Jardiance 10 mg daily, and Entresto 24-26 twice daily dosing.  Discussed with patient possible adding Aldactone therapy for discussing side effects patient wished to hold off for the time being as he states that he is feeling well.  Will go ahead and repeat echocardiogram next visit

## 2025-03-03 DIAGNOSIS — Z12.12 SCREENING FOR COLORECTAL CANCER: Primary | ICD-10-CM

## 2025-03-03 DIAGNOSIS — Z12.11 SCREENING FOR COLORECTAL CANCER: Primary | ICD-10-CM

## 2025-04-02 NOTE — PROGRESS NOTES
"Chief Complaint  Diabetes (Type 2 follow up )    Subjective          Yefri Mcgee presents to [facility]  History of Present Illness  Hypertension-patient has headache, chest pain, dizziness.  Diabetes-patient reports doing well with Jardiance.  He has gained weight since last appointment.  We had discussion regarding GLP-1's, patient is not amenable to that at this time.  Patient reports he will likely get more active with the warmer weather.  Hyperlipidemia patient reports doing well on statin and Zetia at this time.  He is due for recheck.  A-fib-patient reports doing well on Xarelto without any bleeding issues.    Current Outpatient Medications   Medication Instructions    amLODIPine (NORVASC) 10 mg, Oral, Daily    carvedilol (COREG) 25 mg, Oral, 2 Times Daily With Meals    clopidogrel (PLAVIX) 75 mg, Oral, Daily    empagliflozin (JARDIANCE) 10 mg, Oral, Daily    ezetimibe (ZETIA) 10 mg, Oral, Daily    furosemide (LASIX) 80 mg, Oral, Daily    nitroglycerin (NITROSTAT) 0.4 MG SL tablet 1 under the tongue as needed for angina, may repeat q5mins for up three doses    rivaroxaban (XARELTO) 20 mg, Oral, Daily    rosuvastatin (CRESTOR) 40 mg, Oral, Daily    sacubitril-valsartan (Entresto) 24-26 MG tablet 1 tablet, Oral, 2 Times Daily       The following portions of the patient's history were reviewed and updated as appropriate: allergies, current medications, past family history, past medical history, past social history, past surgical history, and problem list.    Objective   Vital Signs:   /84 (BP Location: Left arm, Patient Position: Sitting, Cuff Size: Large Adult)   Pulse 75   Temp 97.5 °F (36.4 °C) (Temporal)   Ht 175.3 cm (69\")   Wt 131 kg (289 lb)   SpO2 95%   BMI 42.68 kg/m²     BP Readings from Last 3 Encounters:   04/07/25 119/84   11/06/24 129/87   09/30/24 126/88     Wt Readings from Last 3 Encounters:   04/07/25 131 kg (289 lb)   11/06/24 " 129 kg (284 lb)   09/30/24 126 kg (277 lb 9.6 oz)       Physical Exam   Appearance: No acute distress, well-nourished  Head: normocephalic, atraumatic  Eyes: extraocular movements intact, no scleral icterus, no conjunctival injection  Ears, Nose, and Throat: external ears normal, nares patent, moist mucous membranes  Cardiovascular: regular rate and rhythm. no murmurs, rubs, or gallops. no edema  Respiratory: breathing comfortably, symmetric chest rise, clear to auscultation bilaterally. No wheezes, rales, or rhonchi.  Neuro: alert and oriented to time, place, and person. Normal gait  Psych: normal mood and affect     Result Review :   The following data was reviewed by: Len Nichols Jr, MD on 04/07/2025:  Common labs          6/14/2024    17:34 9/30/2024    15:11   Common Labs   Glucose 105  106    BUN 18  14    Creatinine 1.44  1.33    Sodium 143  138    Potassium 4.0  4.1    Chloride 105  104    Calcium 9.3  10.1    Albumin 4.0  4.6    Total Bilirubin  0.4    Alkaline Phosphatase  74    AST (SGOT)  26    ALT (SGPT)  23    WBC 9.63     Hemoglobin 16.7     Hematocrit 50.9     Platelets 294     Total Cholesterol  211    Triglycerides  319    HDL Cholesterol  34    LDL Cholesterol   121    Hemoglobin A1C  6.30        Lab Results   Component Value Date    INR 1.00 04/03/2023    BILIRUBINUR Negative 06/14/2024          Assessment and Plan    Diagnoses and all orders for this visit:    1. Primary hypertension (Primary)  Comments:  Well-controlled at this time.  Orders:  -     Lipid Panel    2. Type 2 diabetes mellitus with hyperglycemia, without long-term current use of insulin  Comments:  Check lab.  Concerned with weight gain.  Discussed use of GLP-1 if patient desires in the future.  Orders:  -     Hemoglobin A1c  -     Microalbumin / Creatinine Urine Ratio - Urine, Clean Catch    3. Hyperlipidemia LDL goal <55  -     CBC & Differential    4. Screening for colorectal cancer  Comments:  Patient has  Cologuard test to complete.    5. Need for COVID-19 vaccine    6. Need for shingles vaccine    7. Paroxysmal atrial fibrillation  Comments:  Rate control.  Continue Xarelto.    8. CKD stage 3a, GFR 45-59 ml/min  Comments:  Monitoring renal function.  Orders:  -     Comprehensive Metabolic Panel  -     Vitamin D,25-Hydroxy    9. Chronic HFrEF (heart failure with reduced ejection fraction)  Comments:  Continue Entresto, beta-blocker, Jardiance.  May consider MRA in the future for concurrent CKD as well.  Orders:  -     proBNP        There are no discontinued medications.     Follow Up   Return in about 4 months (around 8/7/2025) for DM, HTN.  Patient was given instructions and counseling regarding his condition or for health maintenance advice. Please see specific information pulled into the AVS if appropriate.       Len Nichols Jr, MD  04/07/25  17:55 EDT

## 2025-04-07 ENCOUNTER — OFFICE VISIT (OUTPATIENT)
Dept: INTERNAL MEDICINE | Facility: CLINIC | Age: 55
End: 2025-04-07
Payer: OTHER GOVERNMENT

## 2025-04-07 VITALS
WEIGHT: 289 LBS | DIASTOLIC BLOOD PRESSURE: 84 MMHG | OXYGEN SATURATION: 95 % | HEIGHT: 69 IN | HEART RATE: 75 BPM | TEMPERATURE: 97.5 F | SYSTOLIC BLOOD PRESSURE: 119 MMHG | BODY MASS INDEX: 42.8 KG/M2

## 2025-04-07 DIAGNOSIS — I10 PRIMARY HYPERTENSION: Primary | ICD-10-CM

## 2025-04-07 DIAGNOSIS — Z12.12 SCREENING FOR COLORECTAL CANCER: ICD-10-CM

## 2025-04-07 DIAGNOSIS — E11.65 TYPE 2 DIABETES MELLITUS WITH HYPERGLYCEMIA, WITHOUT LONG-TERM CURRENT USE OF INSULIN: ICD-10-CM

## 2025-04-07 DIAGNOSIS — Z23 NEED FOR COVID-19 VACCINE: ICD-10-CM

## 2025-04-07 DIAGNOSIS — E78.5 HYPERLIPIDEMIA LDL GOAL <70: ICD-10-CM

## 2025-04-07 DIAGNOSIS — Z12.11 SCREENING FOR COLORECTAL CANCER: ICD-10-CM

## 2025-04-07 DIAGNOSIS — I50.22 CHRONIC HFREF (HEART FAILURE WITH REDUCED EJECTION FRACTION): ICD-10-CM

## 2025-04-07 DIAGNOSIS — N18.31 CKD STAGE 3A, GFR 45-59 ML/MIN: ICD-10-CM

## 2025-04-07 DIAGNOSIS — I48.0 PAROXYSMAL ATRIAL FIBRILLATION: ICD-10-CM

## 2025-04-07 DIAGNOSIS — Z23 NEED FOR SHINGLES VACCINE: ICD-10-CM

## 2025-04-07 LAB
25(OH)D3 SERPL-MCNC: 18.6 NG/ML (ref 30–100)
ALBUMIN SERPL-MCNC: 4.2 G/DL (ref 3.5–5.2)
ALBUMIN UR-MCNC: 7.5 MG/DL
ALBUMIN/GLOB SERPL: 1.2 G/DL
ALP SERPL-CCNC: 68 U/L (ref 39–117)
ALT SERPL W P-5'-P-CCNC: 20 U/L (ref 1–41)
ANION GAP SERPL CALCULATED.3IONS-SCNC: 12.9 MMOL/L (ref 5–15)
AST SERPL-CCNC: 23 U/L (ref 1–40)
BASOPHILS # BLD AUTO: 0.1 10*3/MM3 (ref 0–0.2)
BASOPHILS NFR BLD AUTO: 1.2 % (ref 0–1.5)
BILIRUB SERPL-MCNC: 0.5 MG/DL (ref 0–1.2)
BUN SERPL-MCNC: 12 MG/DL (ref 6–20)
BUN/CREAT SERPL: 9 (ref 7–25)
CALCIUM SPEC-SCNC: 9.4 MG/DL (ref 8.6–10.5)
CHLORIDE SERPL-SCNC: 105 MMOL/L (ref 98–107)
CHOLEST SERPL-MCNC: 158 MG/DL (ref 0–200)
CO2 SERPL-SCNC: 21.1 MMOL/L (ref 22–29)
CREAT SERPL-MCNC: 1.34 MG/DL (ref 0.76–1.27)
CREAT UR-MCNC: 77.2 MG/DL
DEPRECATED RDW RBC AUTO: 41.4 FL (ref 37–54)
EGFRCR SERPLBLD CKD-EPI 2021: 63.3 ML/MIN/1.73
EOSINOPHIL # BLD AUTO: 0.16 10*3/MM3 (ref 0–0.4)
EOSINOPHIL NFR BLD AUTO: 1.9 % (ref 0.3–6.2)
ERYTHROCYTE [DISTWIDTH] IN BLOOD BY AUTOMATED COUNT: 13.3 % (ref 12.3–15.4)
GLOBULIN UR ELPH-MCNC: 3.6 GM/DL
GLUCOSE SERPL-MCNC: 98 MG/DL (ref 65–99)
HBA1C MFR BLD: 6.4 % (ref 4.8–5.6)
HCT VFR BLD AUTO: 50.1 % (ref 37.5–51)
HDLC SERPL-MCNC: 23 MG/DL (ref 40–60)
HGB BLD-MCNC: 16.8 G/DL (ref 13–17.7)
IMM GRANULOCYTES # BLD AUTO: 0.04 10*3/MM3 (ref 0–0.05)
IMM GRANULOCYTES NFR BLD AUTO: 0.5 % (ref 0–0.5)
LDLC SERPL CALC-MCNC: 89 MG/DL (ref 0–100)
LDLC/HDLC SERPL: 3.52 {RATIO}
LYMPHOCYTES # BLD AUTO: 2.29 10*3/MM3 (ref 0.7–3.1)
LYMPHOCYTES NFR BLD AUTO: 26.8 % (ref 19.6–45.3)
MCH RBC QN AUTO: 28.7 PG (ref 26.6–33)
MCHC RBC AUTO-ENTMCNC: 33.5 G/DL (ref 31.5–35.7)
MCV RBC AUTO: 85.6 FL (ref 79–97)
MICROALBUMIN/CREAT UR: 97.2 MG/G (ref 0–29)
MONOCYTES # BLD AUTO: 0.73 10*3/MM3 (ref 0.1–0.9)
MONOCYTES NFR BLD AUTO: 8.6 % (ref 5–12)
NEUTROPHILS NFR BLD AUTO: 5.21 10*3/MM3 (ref 1.7–7)
NEUTROPHILS NFR BLD AUTO: 61 % (ref 42.7–76)
NRBC BLD AUTO-RTO: 0 /100 WBC (ref 0–0.2)
NT-PROBNP SERPL-MCNC: 528 PG/ML (ref 0–900)
PLATELET # BLD AUTO: 282 10*3/MM3 (ref 140–450)
PMV BLD AUTO: 10.3 FL (ref 6–12)
POTASSIUM SERPL-SCNC: 4.3 MMOL/L (ref 3.5–5.2)
PROT SERPL-MCNC: 7.8 G/DL (ref 6–8.5)
RBC # BLD AUTO: 5.85 10*6/MM3 (ref 4.14–5.8)
SODIUM SERPL-SCNC: 139 MMOL/L (ref 136–145)
TRIGL SERPL-MCNC: 270 MG/DL (ref 0–150)
VLDLC SERPL-MCNC: 46 MG/DL (ref 5–40)
WBC NRBC COR # BLD AUTO: 8.53 10*3/MM3 (ref 3.4–10.8)

## 2025-04-07 PROCEDURE — 80053 COMPREHEN METABOLIC PANEL: CPT | Performed by: INTERNAL MEDICINE

## 2025-04-07 PROCEDURE — 83036 HEMOGLOBIN GLYCOSYLATED A1C: CPT | Performed by: INTERNAL MEDICINE

## 2025-04-07 PROCEDURE — 83880 ASSAY OF NATRIURETIC PEPTIDE: CPT | Performed by: INTERNAL MEDICINE

## 2025-04-07 PROCEDURE — 82043 UR ALBUMIN QUANTITATIVE: CPT | Performed by: INTERNAL MEDICINE

## 2025-04-07 PROCEDURE — 82306 VITAMIN D 25 HYDROXY: CPT | Performed by: INTERNAL MEDICINE

## 2025-04-07 PROCEDURE — 82570 ASSAY OF URINE CREATININE: CPT | Performed by: INTERNAL MEDICINE

## 2025-04-07 PROCEDURE — 85025 COMPLETE CBC W/AUTO DIFF WBC: CPT | Performed by: INTERNAL MEDICINE

## 2025-04-07 PROCEDURE — 80061 LIPID PANEL: CPT | Performed by: INTERNAL MEDICINE

## 2025-04-11 ENCOUNTER — RESULTS FOLLOW-UP (OUTPATIENT)
Dept: INTERNAL MEDICINE | Facility: CLINIC | Age: 55
End: 2025-04-11
Payer: OTHER GOVERNMENT

## 2025-04-11 RX ORDER — ERGOCALCIFEROL 1.25 MG/1
50000 CAPSULE, LIQUID FILLED ORAL WEEKLY
Qty: 13 CAPSULE | Refills: 3 | Status: SHIPPED | OUTPATIENT
Start: 2025-04-11

## 2025-04-14 NOTE — TELEPHONE ENCOUNTER
"Called patient no answer left Vm     Relay     \"Vitamin D low- would recommend supplement. Will prescribe.      Elevated protein in urine. Medicines such as entresto and jardiance have been shown to help protect kidneys.      Elevated triglycerides, which are the storage form of sugar - recommend lower carbohydrate/sugar intake.      HDL low - this is protective cholesterol and generally like the number to be >50. encourage exercise to increase level.      HgbA1c remains stable. \"                "

## 2025-04-15 NOTE — TELEPHONE ENCOUNTER
"HUB RELAYED MESSAGE. PATIENT'S WIFE VOICED UNDERSTANDING AND HAS NO FURTHER QUESTIONS.          Quinn Bonilla      4/14/25  4:30 PM  Note      Called patient no answer left Vm      Relay      \"Vitamin D low- would recommend supplement. Will prescribe.      Elevated protein in urine. Medicines such as entresto and jardiance have been shown to help protect kidneys.      Elevated triglycerides, which are the storage form of sugar - recommend lower carbohydrate/sugar intake.      HDL low - this is protective cholesterol and generally like the number to be >50. encourage exercise to increase level.      HgbA1c remains stable. \"        "

## 2025-05-02 ENCOUNTER — HOSPITAL ENCOUNTER (OUTPATIENT)
Facility: HOSPITAL | Age: 55
Discharge: HOME | End: 2025-05-02
Admitting: INTERNAL MEDICINE
Payer: OTHER GOVERNMENT

## 2025-05-02 DIAGNOSIS — I50.22 CHRONIC HFREF (HEART FAILURE WITH REDUCED EJECTION FRACTION): ICD-10-CM

## 2025-05-02 PROCEDURE — 93306 TTE W/DOPPLER COMPLETE: CPT

## 2025-05-05 ENCOUNTER — RESULTS FOLLOW-UP (OUTPATIENT)
Dept: CARDIOLOGY | Facility: CLINIC | Age: 55
End: 2025-05-05
Payer: OTHER GOVERNMENT

## 2025-05-05 DIAGNOSIS — I50.22 CHRONIC HFREF (HEART FAILURE WITH REDUCED EJECTION FRACTION): Primary | ICD-10-CM

## 2025-05-05 LAB
AORTIC DIMENSIONLESS INDEX: 0.65 (DI)
ASCENDING AORTA: 3.7 CM
AV MEAN PRESS GRAD SYS DOP V1V2: 3 MMHG
BH CV ECHO MEAS - AO ROOT DIAM: 3.7 CM
BH CV ECHO MEAS - AO V2 VTI: 21.2 CM
BH CV ECHO MEAS - AVA(I,D): 2.7 CM2
BH CV ECHO MEAS - EDV(CUBED): 427 ML
BH CV ECHO MEAS - EDV(MOD-SP2): 247 ML
BH CV ECHO MEAS - EDV(MOD-SP4): 281 ML
BH CV ECHO MEAS - EF(MOD-SP2): 45.3 %
BH CV ECHO MEAS - EF(MOD-SP4): 30.6 %
BH CV ECHO MEAS - ESV(CUBED): 223.6 ML
BH CV ECHO MEAS - ESV(MOD-SP2): 135 ML
BH CV ECHO MEAS - ESV(MOD-SP4): 195 ML
BH CV ECHO MEAS - FS: 19.4 %
BH CV ECHO MEAS - IVS/LVPW: 0.82 CM
BH CV ECHO MEAS - IVSD: 0.81 CM
BH CV ECHO MEAS - LAT PEAK E' VEL: 5.4 CM/SEC
BH CV ECHO MEAS - LV DIASTOLIC VOL/BSA (35-75): 116.3 CM2
BH CV ECHO MEAS - LV MASS(C)D: 322.4 GRAMS
BH CV ECHO MEAS - LV MAX PG: 2.1 MMHG
BH CV ECHO MEAS - LV MEAN PG: 1 MMHG
BH CV ECHO MEAS - LV SYSTOLIC VOL/BSA (12-30): 80.7 CM2
BH CV ECHO MEAS - LV V1 MAX: 72.5 CM/SEC
BH CV ECHO MEAS - LV V1 VTI: 13.8 CM
BH CV ECHO MEAS - LVIDD: 7.5 CM
BH CV ECHO MEAS - LVIDS: 6.1 CM
BH CV ECHO MEAS - LVOT AREA: 4.2 CM2
BH CV ECHO MEAS - LVOT DIAM: 2.3 CM
BH CV ECHO MEAS - LVPWD: 0.99 CM
BH CV ECHO MEAS - MED PEAK E' VEL: 5.1 CM/SEC
BH CV ECHO MEAS - MV A MAX VEL: 84 CM/SEC
BH CV ECHO MEAS - MV DEC SLOPE: 656 CM/SEC2
BH CV ECHO MEAS - MV DEC TIME: 0.14 SEC
BH CV ECHO MEAS - MV E MAX VEL: 90.6 CM/SEC
BH CV ECHO MEAS - MV E/A: 1.08
BH CV ECHO MEAS - PA V2 MAX: 78.2 CM/SEC
BH CV ECHO MEAS - SV(LVOT): 57.3 ML
BH CV ECHO MEAS - SV(MOD-SP2): 112 ML
BH CV ECHO MEAS - SV(MOD-SP4): 86 ML
BH CV ECHO MEAS - SVI(LVOT): 23.7 ML/M2
BH CV ECHO MEAS - SVI(MOD-SP2): 46.4 ML/M2
BH CV ECHO MEAS - SVI(MOD-SP4): 35.6 ML/M2
BH CV ECHO MEAS - TAPSE (>1.6): 1.8 CM
BH CV ECHO MEASUREMENTS AVERAGE E/E' RATIO: 17.26
BH CV XLRA - TDI S': 9.4 CM/SEC
IVRT: 82 MS
LEFT ATRIUM VOLUME INDEX: 29.4 ML/M2
LV EF 2D ECHO EST: 35 %
LV EF BIPLANE MOD: 38.4 %

## 2025-05-06 ENCOUNTER — CLINICAL SUPPORT NO REQUIREMENTS (OUTPATIENT)
Dept: CARDIOLOGY | Facility: CLINIC | Age: 55
End: 2025-05-06
Payer: OTHER GOVERNMENT

## 2025-05-06 ENCOUNTER — OFFICE VISIT (OUTPATIENT)
Dept: CARDIOLOGY | Facility: CLINIC | Age: 55
End: 2025-05-06
Payer: OTHER GOVERNMENT

## 2025-05-06 VITALS
DIASTOLIC BLOOD PRESSURE: 77 MMHG | HEIGHT: 69 IN | WEIGHT: 288 LBS | SYSTOLIC BLOOD PRESSURE: 118 MMHG | HEART RATE: 70 BPM | BODY MASS INDEX: 42.65 KG/M2

## 2025-05-06 DIAGNOSIS — I50.22 CHRONIC HFREF (HEART FAILURE WITH REDUCED EJECTION FRACTION): Primary | ICD-10-CM

## 2025-05-06 DIAGNOSIS — I48.0 PAROXYSMAL ATRIAL FIBRILLATION: ICD-10-CM

## 2025-05-06 DIAGNOSIS — I50.22 CHRONIC HFREF (HEART FAILURE WITH REDUCED EJECTION FRACTION): ICD-10-CM

## 2025-05-06 DIAGNOSIS — E78.5 HYPERLIPIDEMIA LDL GOAL <70: ICD-10-CM

## 2025-05-06 DIAGNOSIS — Z95.810 ICD (IMPLANTABLE CARDIOVERTER-DEFIBRILLATOR), DUAL, IN SITU: ICD-10-CM

## 2025-05-06 DIAGNOSIS — I10 PRIMARY HYPERTENSION: ICD-10-CM

## 2025-05-06 DIAGNOSIS — Z95.810 ICD (IMPLANTABLE CARDIOVERTER-DEFIBRILLATOR) IN PLACE: ICD-10-CM

## 2025-05-06 DIAGNOSIS — Z95.5 S/P CORONARY ARTERY STENT PLACEMENT: Primary | ICD-10-CM

## 2025-05-06 PROCEDURE — 93260 PRGRMG DEV EVAL IMPLTBL SYS: CPT | Performed by: INTERNAL MEDICINE

## 2025-05-06 RX ORDER — FUROSEMIDE 80 MG/1
40 TABLET ORAL DAILY
Start: 2025-05-06

## 2025-05-06 NOTE — PROGRESS NOTES
Chief Complaint  Follow-up, Pacemaker Check, Atrial Fibrillation, Hypertension, and Hyperlipidemia    Subjective            History of Present Illness  Yefri Mcgee is a 54-year-old male patient who presents to the office today for follow up.    History of Present Illness  The patient presents for follow-up of coronary artery disease, atrial fibrillation, heart failure, blood pressure and cholesterol control.    He has a history of coronary artery disease, for which he underwent stent placement in the past. He is currently on Plavix 75 mg daily for the coronary artery disease.    He has a history of atrial fibrillation and is on Xarelto 20 mg daily to reduce the risk of stroke.    He has a history of heart failure and has an ICD defibrillator that is being monitored. He is on carvedilol 25 mg twice daily for blood pressure and heart muscle preservation, Jardiance 10 mg daily for heart muscle preservation, and Entresto 24-26 mg twice daily to help preserve heart muscle function.    He is on amlodipine 10 mg daily for blood pressure lowering.    He is on rosuvastatin 40 mg daily for cholesterol lowering and Zetia 10 mg daily to lower cholesterol levels. He expresses a preference to postpone the initiation of PCSK9 inhibitors at this time.    He is on Lasix 80 mg daily to prevent fluid retention. He has made modifications to his medication regimen, specifically reducing the dosage of furosemide from 80 mg to half a tablet. He administers both tablets in the morning but refrains from taking the second dose.    MEDICATIONS  Current: Amlodipine 10 mg daily, carvedilol 25 mg twice daily, Plavix 75 mg daily, Jardiance 10 mg daily, Zetia 10 mg daily, Lasix 80 mg daily, Xarelto 20 mg daily, rosuvastatin 40 mg daily, Entresto 24-26 mg twice daily.        PMH  Past Medical History:   Diagnosis Date    Chronic kidney disease     NO DIALYSIS    Chronic systolic CHF 08/09/2021 07/28/2021 Heart Cath: Moderately reduced left  "ventricular systolic function with an estimated ejection fraction of 35%     Head injury     NO RESIDUAL    History of (STEMI) due to occlusion of mid portion of left anterior descending (LAD) coronary artery 07/28/2021    STENT PLACED    Hyperlipidemia     Hypertension     ICD (implantable cardioverter-defibrillator) \"MRI COMPATIBLE S-ICD [employer] A219     MRI safe cardiac ICD in situ     Paroxysmal atrial fibrillation 01/15/2019    PFO (patent foramen ovale) 06/2014    STEMI involving left anterior descending coronary artery 07/2021    also previous PCI in 2014    Stroke     NO RESIDUAL         ALLERGY  Allergies   Allergen Reactions    Eliquis [Apixaban] Paresthesia          SURGICALHX  Past Surgical History:   Procedure Laterality Date    CARDIAC CATHETERIZATION N/A 7/28/2021    Procedure: Left Heart Cath;  Surgeon: Enrique Jack MD;  Location: [facility] CATH INVASIVE LOCATION;  Service: Cardiovascular;  Laterality: N/A;    CARDIAC ELECTROPHYSIOLOGY PROCEDURE N/A 4/4/2023    Procedure: ICD SUBCUTANEOUS new;  Surgeon: Leo Alonso MD;  Location: [facility] CATH INVASIVE LOCATION;  Service: Cardiovascular;  Laterality: N/A;  HOLD PLAVIX 5 DAYS PRIOR  HOLD XARELTO 2 DAYS PRIOR    CARDIAC SURGERY      HEART BYPASS     CAROTID STENT            SOC  Social History     Socioeconomic History    Marital status:    Tobacco Use    Smoking status: Every Day     Current packs/day: 1.00     Average packs/day: 1 pack/day for 40.3 years (40.3 ttl pk-yrs)     Types: Cigarettes     Start date: 1985    Smokeless tobacco: Never    Tobacco comments:     CURRENT EVERYDAY SMOKER, SMOKED 31 OR MORE YEARS       INST PER ANESTHESIA PROTOCOL   Vaping Use    Vaping status: Former    Substances: Nicotine, Flavoring    Devices: Refillable tank   Substance and Sexual Activity    Alcohol use: Yes     Comment: DRINKS monthly, LESS THAN 1 DRINK PER DAY, HAS BEEN DRINKING FOR 31 OR MORE YEARS    Drug use: Never    Sexual activity: " "Defer         FAMHX  Family History   Problem Relation Age of Onset    Cancer Mother     Heart disease Father     Diabetes Father     Malig Hyperthermia Neg Hx           MEDSIGONLY  Current Outpatient Medications on File Prior to Visit   Medication Sig    amLODIPine (NORVASC) 10 MG tablet Take 1 tablet by mouth Daily.    carvedilol (COREG) 25 MG tablet Take 1 tablet by mouth 2 (Two) Times a Day With Meals.    clopidogrel (PLAVIX) 75 MG tablet Take 1 tablet by mouth Daily.    empagliflozin (JARDIANCE) 10 MG tablet tablet Take 1 tablet by mouth Daily.    ezetimibe (ZETIA) 10 MG tablet Take 1 tablet by mouth Daily.    nitroglycerin (NITROSTAT) 0.4 MG SL tablet 1 under the tongue as needed for angina, may repeat q5mins for up three doses    rivaroxaban (XARELTO) 20 MG tablet Take 1 tablet by mouth Daily.    rosuvastatin (CRESTOR) 40 MG tablet Take 1 tablet by mouth Daily.    sacubitril-valsartan (Entresto) 24-26 MG tablet Take 1 tablet by mouth 2 (Two) Times a Day.    vitamin D (ERGOCALCIFEROL) 1.25 MG (53362 UT) capsule capsule Take 1 capsule by mouth 1 (One) Time Per Week.    [DISCONTINUED] furosemide (LASIX) 80 MG tablet Take 1 tablet by mouth Daily.     No current facility-administered medications on file prior to visit.         Objective   /77   Pulse 70   Ht 175.3 cm (69.02\")   Wt 131 kg (288 lb)   BMI 42.51 kg/m²       Physical Exam  Constitutional:       Appearance: He is obese.   HENT:      Head: Normocephalic.   Neck:      Vascular: No carotid bruit.   Cardiovascular:      Rate and Rhythm: Normal rate and regular rhythm.      Pulses: Normal pulses.      Heart sounds: Normal heart sounds. No murmur heard.  Pulmonary:      Effort: Pulmonary effort is normal.      Breath sounds: Normal breath sounds.   Musculoskeletal:      Cervical back: Neck supple.      Right lower leg: No edema.      Left lower leg: No edema.   Skin:     General: Skin is dry.   Neurological:      Mental Status: He is alert and " "oriented to person, place, and time.   Psychiatric:         Behavior: Behavior normal.       Result Review :   The following data was reviewed by: ROYCE Frankel on 05/06/2025:  proBNP   Date Value Ref Range Status   04/07/2025 528.0 0.0 - 900.0 pg/mL Final     CMP          9/30/2024    15:11 4/7/2025    14:07   CMP   Glucose 106  98    BUN 14  12    Creatinine 1.33  1.34    EGFR 64.3  63.3    Sodium 138  139    Potassium 4.1  4.3    Chloride 104  105    Calcium 10.1  9.4    Total Protein 8.0  7.8    Albumin 4.6  4.2    Globulin 3.4  3.6    Total Bilirubin 0.4  0.5    Alkaline Phosphatase 74  68    AST (SGOT) 26  23    ALT (SGPT) 23  20    Albumin/Globulin Ratio 1.4  1.2    BUN/Creatinine Ratio 10.5  9.0    Anion Gap 12.0  12.9      CBC w/diff          6/14/2024    17:34 4/7/2025    14:07   CBC w/Diff   WBC 9.63  8.53    RBC 5.77  5.85    Hemoglobin 16.7  16.8    Hematocrit 50.9  50.1    MCV 88.2  85.6    MCH 28.9  28.7    MCHC 32.8  33.5    RDW 13.0  13.3    Platelets 294  282    Neutrophil Rel % 53.0  61.0    Immature Granulocyte Rel % 0.2  0.5    Lymphocyte Rel % 32.7  26.8    Monocyte Rel % 11.2  8.6    Eosinophil Rel % 2.0  1.9    Basophil Rel % 0.9  1.2       Lab Results   Component Value Date    TSH 1.400 06/07/2021      No results found for: \"FREET4\"   No results found for: \"DDIMERQUANT\"  Magnesium   Date Value Ref Range Status   07/28/2021 1.8 1.6 - 2.6 mg/dL Final      No results found for: \"DIGOXIN\"   Lab Results   Component Value Date    TROPONINT <0.01 01/03/2019           Lipid Panel          9/30/2024    15:11 4/7/2025    14:07   Lipid Panel   Total Cholesterol 211  158    Triglycerides 319  270    HDL Cholesterol 34  23    VLDL Cholesterol 56  46    LDL Cholesterol  121  89    LDL/HDL Ratio 3.33  3.52        Results for orders placed during the hospital encounter of 05/02/25    Adult Transthoracic Echo Complete W/ Cont if Necessary Per Protocol    Interpretation Summary    Left ventricular " systolic function is moderately decreased. Estimated left ventricular EF = 35% with area of severe hypokinesis to akinesis involving the anterior and septal myocardium.    The left ventricular cavity is dilated.    Left ventricular diastolic function is consistent with (grade Ia w/high LAP) impaired relaxation.    The left atrial cavity is mildly dilated.    Patient with area of hypokinesis to akinesis on anterior septal myocardium not well-visualized visualized would recommend repeat limited echo with Definity contrast to exclude possible thrombus      [id number] Score: 4          Assessment and Plan    Diagnoses and all orders for this visit:    1. S/P coronary artery stent placement (Primary)    2. Paroxysmal atrial fibrillation    3. Chronic HFrEF (heart failure with reduced ejection fraction)    4. Subcutaneous ICD    5. Hyperlipidemia LDL goal <70    6. Primary hypertension  -     furosemide (LASIX) 80 MG tablet; Take 0.5 tablets by mouth Daily.      Assessment & Plan  1. Coronary Artery Disease.  He has a history of coronary artery disease with stent placement. His current medication regimen includes Plavix 75 mg daily for coronary artery disease. The goal for LDL cholesterol is set below 70 due to his coronary artery disease and stent placement. He is advised to continue his current medications and maintain a heart-healthy diet. If LDL levels increase, PCSK9 inhibitors may be considered.    2. Atrial Fibrillation.  He has atrial fibrillation and is on Xarelto 20 mg daily to reduce the risk of stroke. He is advised to continue this medication.    3. Heart Failure.  His heart muscle function has improved from 30% in October 2022 to 35% as of the latest echocardiogram. He is advised to continue his current medications. A limited echocardiogram with contrast will be scheduled to ensure better imaging quality and rule out any blood clots, especially given his history of stroke.    4. Blood Pressure  Management.  His blood pressure is well-controlled today. He is currently on amlodipine 10 mg daily and carvedilol 25 mg twice daily for blood pressure management. He is advised to continue these medications.    5. Cholesterol Management.  His cholesterol levels have significantly improved, with total cholesterol now in the normal range and LDL cholesterol reduced from 121 to 89. He is on rosuvastatin 40 mg daily and Zetia 10 mg daily for cholesterol lowering. He is advised to continue these medications and work on dietary modifications, including reducing carbs and fats and practicing portion control. If LDL levels increase, PCSK9 inhibitors may be considered.    6. Medication Management.  He has adjusted his furosemide (Lasix) intake to half of the 80 mg tablet due to tolerance issues. His kidney function, sodium, and potassium levels are stable. He is advised to continue this adjusted dosage and monitor for any changes.    Follow-up  The patient will follow up in 6 months with Dr. Alonso.    PROCEDURE  The patient underwent stent placement in the past.      Follow Up   Return in about 6 months (around 11/6/2025) for Follow up with Dr Alonso.    Patient was given instructions and counseling regarding his condition or for health maintenance advice. Please see specific information pulled into the AVS if appropriate.            Patient or patient representative verbalized consent for the use of Ambient Listening during the visit with  ROYCE Frankel for chart documentation. 5/10/2025  20:22 EDT    ROYCE Frankel  05/06/25  14:21 EDT    Dictated Utilizing Dragon Dictation

## 2025-05-13 ENCOUNTER — TELEPHONE (OUTPATIENT)
Dept: INTERNAL MEDICINE | Facility: CLINIC | Age: 55
End: 2025-05-13
Payer: OTHER GOVERNMENT

## 2025-05-13 DIAGNOSIS — I50.22 CHRONIC SYSTOLIC CHF (CONGESTIVE HEART FAILURE): ICD-10-CM

## 2025-05-13 DIAGNOSIS — I10 PRIMARY HYPERTENSION: ICD-10-CM

## 2025-05-13 RX ORDER — ROSUVASTATIN CALCIUM 40 MG/1
40 TABLET, COATED ORAL DAILY
Qty: 90 TABLET | Refills: 3 | Status: SHIPPED | OUTPATIENT
Start: 2025-05-13

## 2025-05-13 RX ORDER — CLOPIDOGREL BISULFATE 75 MG/1
75 TABLET ORAL DAILY
Qty: 90 TABLET | Refills: 3 | Status: SHIPPED | OUTPATIENT
Start: 2025-05-13

## 2025-05-13 RX ORDER — SACUBITRIL AND VALSARTAN 24; 26 MG/1; MG/1
1 TABLET, FILM COATED ORAL 2 TIMES DAILY
Qty: 180 TABLET | Refills: 3 | Status: SHIPPED | OUTPATIENT
Start: 2025-05-13

## 2025-05-13 RX ORDER — EZETIMIBE 10 MG/1
10 TABLET ORAL DAILY
Qty: 90 TABLET | Refills: 3 | Status: SHIPPED | OUTPATIENT
Start: 2025-05-13

## 2025-05-13 NOTE — TELEPHONE ENCOUNTER
Caller: [facility].    Relationship: Emergency Contact    Best call back number: [phone]     Requested Prescriptions: ALL ACTIVE MEDICATIONS  Requested Prescriptions      No prescriptions requested or ordered in this encounter        Pharmacy where request should be sent: [facility] - [place], [place] - [address] [phone] [facility] [phone]      Last office visit with prescribing clinician: 2025   Last telemedicine visit with prescribing clinician: Visit date not found   Next office visit with prescribing clinician: 2025     Additional details provided by patient: CALLER REQUESTING ALL ACTIVE MEDICATIONS TO BE REFILLED. CALLER STATES PRESCRIPTIONS ARE . UNABLE TO PROVIDE NAMES OF ACTIVE MEDICATIONS.    Does the patient have less than a 3 day supply:  [] Yes  [x] No    Would you like a call back once the refill request has been completed: [] Yes [] No    If the office needs to give you a call back, can they leave a voicemail: [] Yes [] No    Nav Trev[employer] Rep   25 12:57 EDT

## 2025-05-16 ENCOUNTER — HOSPITAL ENCOUNTER (OUTPATIENT)
Facility: HOSPITAL | Age: 55
Discharge: HOME | End: 2025-05-16
Admitting: INTERNAL MEDICINE
Payer: OTHER GOVERNMENT

## 2025-05-16 DIAGNOSIS — I50.22 CHRONIC HFREF (HEART FAILURE WITH REDUCED EJECTION FRACTION): ICD-10-CM

## 2025-05-16 PROCEDURE — 93308 TTE F-UP OR LMTD: CPT

## 2025-05-16 PROCEDURE — 93325 DOPPLER ECHO COLOR FLOW MAPG: CPT

## 2025-05-16 PROCEDURE — 25010000002 SULFUR HEXAFLUORIDE MICROSPH 60.7-25 MG RECONSTITUTED SUSPENSION: Performed by: INTERNAL MEDICINE

## 2025-05-16 RX ADMIN — SULFUR HEXAFLUORIDE 5 ML: KIT at 14:03

## 2025-05-18 ENCOUNTER — RESULTS FOLLOW-UP (OUTPATIENT)
Dept: CARDIOLOGY | Facility: CLINIC | Age: 55
End: 2025-05-18
Payer: OTHER GOVERNMENT

## 2025-05-18 LAB
BH CV ECHO MEAS - 2D AUTO EF SIEMENS: 34 %
BH CV ECHO MEAS - EF(MOD-SP2): 35.2 %
BH CV ECHO MEAS - EF(MOD-SP4): 32.2 %
LV EF BIPLANE MOD: 34 %

## 2025-06-27 ENCOUNTER — TRANSCRIBE ORDERS (OUTPATIENT)
Dept: ADMINISTRATIVE | Facility: HOSPITAL | Age: 55
End: 2025-06-27
Payer: OTHER GOVERNMENT

## 2025-06-27 ENCOUNTER — LAB (OUTPATIENT)
Facility: HOSPITAL | Age: 55
End: 2025-06-27
Payer: OTHER GOVERNMENT

## 2025-06-27 DIAGNOSIS — N18.31 CHRONIC KIDNEY DISEASE, STAGE 3A: Primary | ICD-10-CM

## 2025-06-27 DIAGNOSIS — N18.31 CHRONIC KIDNEY DISEASE, STAGE 3A: ICD-10-CM

## 2025-06-27 DIAGNOSIS — E78.5 HYPERLIPIDEMIA LDL GOAL <70: ICD-10-CM

## 2025-06-27 LAB
ALBUMIN SERPL-MCNC: 4.1 G/DL (ref 3.5–5.2)
ALP SERPL-CCNC: 59 U/L (ref 39–117)
ALT SERPL W P-5'-P-CCNC: 26 U/L (ref 1–41)
ANION GAP SERPL CALCULATED.3IONS-SCNC: 9.9 MMOL/L (ref 5–15)
AST SERPL-CCNC: 23 U/L (ref 1–40)
BACTERIA UR QL AUTO: NORMAL /HPF
BILIRUB CONJ SERPL-MCNC: 0.2 MG/DL (ref 0–0.3)
BILIRUB INDIRECT SERPL-MCNC: 0.4 MG/DL
BILIRUB SERPL-MCNC: 0.6 MG/DL (ref 0–1.2)
BILIRUB UR QL STRIP: NEGATIVE
BUN SERPL-MCNC: 13 MG/DL (ref 6–20)
BUN/CREAT SERPL: 9.4 (ref 7–25)
CALCIUM SPEC-SCNC: 9.3 MG/DL (ref 8.6–10.5)
CHLORIDE SERPL-SCNC: 103 MMOL/L (ref 98–107)
CHOLEST SERPL-MCNC: 151 MG/DL (ref 0–200)
CLARITY UR: CLEAR
CO2 SERPL-SCNC: 25.1 MMOL/L (ref 22–29)
COLOR UR: YELLOW
CREAT SERPL-MCNC: 1.38 MG/DL (ref 0.76–1.27)
CREAT UR-MCNC: 163.7 MG/DL
EGFRCR SERPLBLD CKD-EPI 2021: 61.1 ML/MIN/1.73
GLUCOSE SERPL-MCNC: 112 MG/DL (ref 65–99)
GLUCOSE UR STRIP-MCNC: ABNORMAL MG/DL
HDLC SERPL-MCNC: 28 MG/DL (ref 40–60)
HGB UR QL STRIP.AUTO: NEGATIVE
HYALINE CASTS UR QL AUTO: NORMAL /LPF
KETONES UR QL STRIP: ABNORMAL
LDLC SERPL CALC-MCNC: 75 MG/DL (ref 0–100)
LDLC/HDLC SERPL: 2.26 {RATIO}
LEUKOCYTE ESTERASE UR QL STRIP.AUTO: NEGATIVE
NITRITE UR QL STRIP: NEGATIVE
PH UR STRIP.AUTO: 6.5 [PH] (ref 5–8)
POTASSIUM SERPL-SCNC: 4.2 MMOL/L (ref 3.5–5.2)
PROT ?TM UR-MCNC: 25 MG/DL
PROT SERPL-MCNC: 7.6 G/DL (ref 6–8.5)
PROT UR QL STRIP: ABNORMAL
PROT/CREAT UR: 0.15 MG/G{CREAT}
RBC # UR STRIP: NORMAL /HPF
REF LAB TEST METHOD: NORMAL
SODIUM SERPL-SCNC: 138 MMOL/L (ref 136–145)
SP GR UR STRIP: >1.03 (ref 1–1.03)
SQUAMOUS #/AREA URNS HPF: NORMAL /HPF
TRIGL SERPL-MCNC: 298 MG/DL (ref 0–150)
UROBILINOGEN UR QL STRIP: ABNORMAL
VLDLC SERPL-MCNC: 48 MG/DL (ref 5–40)
WBC # UR STRIP: NORMAL /HPF

## 2025-06-27 PROCEDURE — 82570 ASSAY OF URINE CREATININE: CPT

## 2025-06-27 PROCEDURE — 84156 ASSAY OF PROTEIN URINE: CPT

## 2025-06-27 PROCEDURE — 81001 URINALYSIS AUTO W/SCOPE: CPT

## 2025-06-27 PROCEDURE — 80061 LIPID PANEL: CPT

## 2025-06-27 PROCEDURE — 80076 HEPATIC FUNCTION PANEL: CPT

## 2025-06-27 PROCEDURE — 36415 COLL VENOUS BLD VENIPUNCTURE: CPT

## 2025-06-27 PROCEDURE — 80048 BASIC METABOLIC PNL TOTAL CA: CPT

## 2025-08-07 ENCOUNTER — OFFICE VISIT (OUTPATIENT)
Dept: INTERNAL MEDICINE | Facility: CLINIC | Age: 55
End: 2025-08-07
Payer: OTHER GOVERNMENT

## 2025-08-07 VITALS
HEART RATE: 67 BPM | DIASTOLIC BLOOD PRESSURE: 82 MMHG | BODY MASS INDEX: 41.98 KG/M2 | HEIGHT: 69 IN | OXYGEN SATURATION: 95 % | WEIGHT: 283.4 LBS | TEMPERATURE: 98.2 F | SYSTOLIC BLOOD PRESSURE: 122 MMHG

## 2025-08-07 DIAGNOSIS — Z23 NEED FOR COVID-19 VACCINE: ICD-10-CM

## 2025-08-07 DIAGNOSIS — Z12.11 COLON CANCER SCREENING: ICD-10-CM

## 2025-08-07 DIAGNOSIS — E78.2 MIXED HYPERLIPIDEMIA: ICD-10-CM

## 2025-08-07 DIAGNOSIS — F17.210 CIGARETTE NICOTINE DEPENDENCE WITHOUT COMPLICATION: ICD-10-CM

## 2025-08-07 DIAGNOSIS — I10 PRIMARY HYPERTENSION: ICD-10-CM

## 2025-08-07 DIAGNOSIS — I50.22 CHRONIC HFREF (HEART FAILURE WITH REDUCED EJECTION FRACTION): ICD-10-CM

## 2025-08-07 DIAGNOSIS — I48.0 PAROXYSMAL ATRIAL FIBRILLATION: ICD-10-CM

## 2025-08-07 DIAGNOSIS — Z23 NEED FOR PNEUMOCOCCAL VACCINATION: ICD-10-CM

## 2025-08-07 DIAGNOSIS — Z95.5 S/P CORONARY ARTERY STENT PLACEMENT: ICD-10-CM

## 2025-08-07 DIAGNOSIS — E11.65 TYPE 2 DIABETES MELLITUS WITH HYPERGLYCEMIA, WITHOUT LONG-TERM CURRENT USE OF INSULIN: ICD-10-CM

## 2025-08-07 DIAGNOSIS — Z00.00 ANNUAL PHYSICAL EXAM: Primary | ICD-10-CM

## 2025-08-11 ENCOUNTER — TELEPHONE (OUTPATIENT)
Dept: GASTROENTEROLOGY | Facility: CLINIC | Age: 55
End: 2025-08-11
Payer: OTHER GOVERNMENT

## 2025-08-13 ENCOUNTER — PATIENT MESSAGE (OUTPATIENT)
Dept: GASTROENTEROLOGY | Facility: CLINIC | Age: 55
End: 2025-08-13
Payer: OTHER GOVERNMENT

## 2025-08-13 ENCOUNTER — TELEPHONE (OUTPATIENT)
Dept: GASTROENTEROLOGY | Facility: CLINIC | Age: 55
End: 2025-08-13
Payer: OTHER GOVERNMENT

## 2025-08-13 ENCOUNTER — CLINICAL SUPPORT (OUTPATIENT)
Dept: GASTROENTEROLOGY | Facility: CLINIC | Age: 55
End: 2025-08-13
Payer: OTHER GOVERNMENT

## 2025-08-13 DIAGNOSIS — Z12.11 ENCOUNTER FOR SCREENING COLONOSCOPY: Primary | ICD-10-CM

## 2025-08-13 RX ORDER — PEG-3350, SODIUM SULFATE, SODIUM CHLORIDE, POTASSIUM CHLORIDE, SODIUM ASCORBATE AND ASCORBIC ACID 7.5-2.691G
1000 KIT ORAL EVERY 12 HOURS
Qty: 2000 ML | Refills: 0 | Status: SHIPPED | OUTPATIENT
Start: 2025-08-13

## 2025-08-20 ENCOUNTER — ANESTHESIA EVENT (OUTPATIENT)
Dept: GASTROENTEROLOGY | Facility: HOSPITAL | Age: 55
End: 2025-08-20
Payer: OTHER GOVERNMENT

## 2025-08-21 ENCOUNTER — ANESTHESIA (OUTPATIENT)
Dept: GASTROENTEROLOGY | Facility: HOSPITAL | Age: 55
End: 2025-08-21
Payer: OTHER GOVERNMENT

## 2025-08-21 ENCOUNTER — HOSPITAL ENCOUNTER (OUTPATIENT)
Facility: HOSPITAL | Age: 55
Setting detail: HOSPITAL OUTPATIENT SURGERY
Discharge: HOME | End: 2025-08-21
Attending: INTERNAL MEDICINE | Admitting: INTERNAL MEDICINE
Payer: OTHER GOVERNMENT

## 2025-08-21 PROCEDURE — 25010000002 PROPOFOL 10 MG/ML EMULSION: Performed by: NURSE ANESTHETIST, CERTIFIED REGISTERED

## 2025-08-21 PROCEDURE — 25010000002 LIDOCAINE PF 2% 2 % SOLUTION: Performed by: NURSE ANESTHETIST, CERTIFIED REGISTERED

## 2025-08-21 RX ORDER — PROPOFOL 10 MG/ML
VIAL (ML) INTRAVENOUS AS NEEDED
Status: DISCONTINUED | OUTPATIENT
Start: 2025-08-21 | End: 2025-08-21 | Stop reason: SURG

## 2025-08-21 RX ORDER — LIDOCAINE HYDROCHLORIDE 20 MG/ML
INJECTION, SOLUTION EPIDURAL; INFILTRATION; INTRACAUDAL; PERINEURAL AS NEEDED
Status: DISCONTINUED | OUTPATIENT
Start: 2025-08-21 | End: 2025-08-21 | Stop reason: SURG

## 2025-08-21 RX ADMIN — LIDOCAINE HYDROCHLORIDE 100 MG: 20 INJECTION, SOLUTION EPIDURAL; INFILTRATION; INTRACAUDAL; PERINEURAL at 12:53

## 2025-08-21 RX ADMIN — PROPOFOL 125 MCG/KG/MIN: 10 INJECTION, EMULSION INTRAVENOUS at 12:54

## 2025-08-21 RX ADMIN — PROPOFOL 100 MG: 10 INJECTION, EMULSION INTRAVENOUS at 12:53

## (undated) DEVICE — SUT VICYL PLS CTD ANTIB BR 1 27IN VIL

## (undated) DEVICE — BLD CLIP UNIV SURG GRY

## (undated) DEVICE — CATH F5INF TLPIGST145 110CM6SH: Brand: INFINITI

## (undated) DEVICE — CONTAINER,SPECIMEN,O.R.STRL,4.5OZ: Brand: MEDLINE

## (undated) DEVICE — DRSNG SURESITE WNDW 4X4.5

## (undated) DEVICE — ADHS SKIN SURG TISS VISC PREMIERPRO EXOFIN HI/VISC FAST/DRY

## (undated) DEVICE — DECANTER: Brand: UNBRANDED

## (undated) DEVICE — CVR PROB ULTRASND GLS STRL

## (undated) DEVICE — MODEL AT P65, P/N 701554-001KIT CONTENTS: HAND CONTROLLER, 3-WAY HIGH-PRESSURE STOPCOCK WITH ROTATING END AND PREMIUM HIGH-PRESSURE TUBING: Brand: ANGIOTOUCH® KIT

## (undated) DEVICE — SUT NUROLON 0 MO7 CR8 18IN C541D

## (undated) DEVICE — RUNTHROUGH NS EXTRA FLOPPY PTCA GUIDEWIRE: Brand: RUNTHROUGH

## (undated) DEVICE — DEV INFL BASIXCOMPAK W/INTRO/20G

## (undated) DEVICE — DRSNG SURG AQUACEL AG 9X10CM

## (undated) DEVICE — PTCA DILATATION CATHETER: Brand: NC QUANTUM APEX™

## (undated) DEVICE — A2000 MULTI-USE SYRINGE KIT, P/N 701277-003KIT CONTENTS: 100ML CONTRAST RESERVOIR AND TUBING WITH CONTRAST SPIKE AND CLAMP: Brand: A2000 MULTI-USE SYRINGE KIT

## (undated) DEVICE — TREK CORONARY DILATATION CATHETER 3.0 MM X 15 MM / RAPID-EXCHANGE: Brand: TREK

## (undated) DEVICE — TR BAND RADIAL ARTERY COMPRESSION DEVICE: Brand: TR BAND

## (undated) DEVICE — SUT MERSILENE POLYSTR UR6 BR 0 75CM GRN

## (undated) DEVICE — PACEMAKER PACK: Brand: MEDLINE INDUSTRIES, INC.

## (undated) DEVICE — APPL CHLORAPREP HI/LITE TINTED 10.5ML ORNG

## (undated) DEVICE — SHT AIR TRANSFR COMFRT GLIDE LAT 40X80IN

## (undated) DEVICE — PENCL E/S SMOKEEVAC W/TELESCP CANN

## (undated) DEVICE — MODEL BT2000 P/N 700287-012KIT CONTENTS: MANIFOLD WITH SALINE AND CONTRAST PORTS, SALINE TUBING WITH SPIKE AND HAND SYRINGE, TRANSDUCER: Brand: BT2000 AUTOMATED MANIFOLD KIT

## (undated) DEVICE — RADIFOCUS OPTITORQUE ANGIOGRAPHIC CATHETER: Brand: OPTITORQUE

## (undated) DEVICE — THE PRONTO CATHETER IS INDICATED FOR THE REMOVAL OF FRESH, SOFT EMBOLI AND THROMBI FROM VESSELS IN THE CORONARY AND PERIPHERAL VASCULATURE.: Brand: PRONTO® V4 EXTRACTION CATHETER

## (undated) DEVICE — UNDYED BRAIDED (POLYGLACTIN 910), SYNTHETIC ABSORBABLE SUTURE: Brand: COATED VICRYL

## (undated) DEVICE — GW FC FLOP/TP .035 260CM 3MM

## (undated) DEVICE — SLV STRL PROB

## (undated) DEVICE — CONTRST ISOVUE370 76PCT 100ML

## (undated) DEVICE — BRACHIAL/AXILLARY/CEREBRAL DRAPE 38 1/2" X 60": Brand: BRACHIAL/AXILLARY/CEREBRAL DRAPE

## (undated) DEVICE — SENSR O2 OXIMAX FNGR ADHS A/ 1P/U

## (undated) DEVICE — 6F .070 XB 3.5 100CM: Brand: VISTA BRITE TIP

## (undated) DEVICE — Device

## (undated) DEVICE — CANNULA,OXY,ADULT,SUPER SOFT,W/14'TUB,UC: Brand: MEDLINE INDUSTRIES, INC.

## (undated) DEVICE — 3M™ STERI-STRIP™ REINFORCED ADHESIVE SKIN CLOSURES, R1546, 1/4 IN X 4 IN (6 MM X 100 MM), 10 STRIPS/ENVELOPE: Brand: 3M™ STERI-STRIP™

## (undated) DEVICE — GLIDESHEATH SLENDER STAINLESS STEEL KIT: Brand: GLIDESHEATH SLENDER

## (undated) DEVICE — DRSNG SURG AQUACEL AG/ADVNTGE 9X15CM 3.5X6IN

## (undated) DEVICE — ANTIBACTERIAL UNDYED BRAIDED (POLYGLACTIN 910), SYNTHETIC ABSORBABLE SUTURE: Brand: COATED VICRYL

## (undated) DEVICE — ST ACC MICROPUNCTURE .018 TRANSLSS/PLAT/TP 4F/10CM 21G/10CM